# Patient Record
Sex: FEMALE | Race: WHITE | Employment: OTHER | URBAN - METROPOLITAN AREA
[De-identification: names, ages, dates, MRNs, and addresses within clinical notes are randomized per-mention and may not be internally consistent; named-entity substitution may affect disease eponyms.]

---

## 2017-06-22 LAB
CREATININE, EXTERNAL: 1.21
HBA1C MFR BLD HPLC: 6.8 %
LDL-C, EXTERNAL: 131

## 2017-06-29 ENCOUNTER — OFFICE VISIT (OUTPATIENT)
Dept: CARDIOLOGY CLINIC | Age: 82
End: 2017-06-29

## 2017-06-29 VITALS
DIASTOLIC BLOOD PRESSURE: 74 MMHG | SYSTOLIC BLOOD PRESSURE: 150 MMHG | HEART RATE: 85 BPM | HEIGHT: 60 IN | BODY MASS INDEX: 27.21 KG/M2 | RESPIRATION RATE: 18 BRPM | WEIGHT: 138.6 LBS

## 2017-06-29 DIAGNOSIS — R06.09 DOE (DYSPNEA ON EXERTION): ICD-10-CM

## 2017-06-29 DIAGNOSIS — R79.89 ELEVATED BRAIN NATRIURETIC PEPTIDE (BNP) LEVEL: Primary | ICD-10-CM

## 2017-06-29 DIAGNOSIS — I10 ESSENTIAL HYPERTENSION: ICD-10-CM

## 2017-06-29 DIAGNOSIS — J44.9 CHRONIC OBSTRUCTIVE PULMONARY DISEASE, UNSPECIFIED COPD TYPE (HCC): ICD-10-CM

## 2017-06-29 RX ORDER — LEVOTHYROXINE SODIUM 100 UG/1
TABLET ORAL
Refills: 1 | COMMUNITY
Start: 2017-04-15 | End: 2018-01-01

## 2017-06-29 RX ORDER — BLOOD-GLUCOSE METER
KIT MISCELLANEOUS
Refills: 0 | COMMUNITY
Start: 2017-06-03 | End: 2018-01-01

## 2017-06-29 RX ORDER — ASPIRIN 81 MG/1
81 TABLET ORAL DAILY
COMMUNITY

## 2017-06-29 RX ORDER — INSULIN GLARGINE 100 [IU]/ML
INJECTION, SOLUTION SUBCUTANEOUS
COMMUNITY
End: 2018-01-01

## 2017-06-29 RX ORDER — GLIPIZIDE 10 MG/1
TABLET ORAL
Refills: 0 | COMMUNITY
Start: 2017-06-09 | End: 2018-01-01

## 2017-06-29 RX ORDER — METFORMIN HYDROCHLORIDE 1000 MG/1
500 TABLET ORAL DAILY
COMMUNITY
End: 2018-01-01

## 2017-06-29 RX ORDER — POTASSIUM CHLORIDE 750 MG/1
TABLET, FILM COATED, EXTENDED RELEASE ORAL
Refills: 0 | COMMUNITY
Start: 2017-06-20 | End: 2018-01-01

## 2017-06-29 RX ORDER — FUROSEMIDE 40 MG/1
TABLET ORAL
Refills: 0 | COMMUNITY
Start: 2017-06-20 | End: 2017-07-20 | Stop reason: DRUGHIGH

## 2017-06-29 RX ORDER — LISINOPRIL 40 MG/1
TABLET ORAL
Refills: 0 | COMMUNITY
Start: 2017-05-08 | End: 2018-01-01

## 2017-06-29 NOTE — PROGRESS NOTES
Deneen Heath is a [de-identified] y.o. female    Chief Complaint   Patient presents with    Shortness of Breath

## 2017-06-29 NOTE — PROGRESS NOTES
Ignacia Dexter     1935       Jodi Lazaro MD, [facility] [place]  Date of Visit-2017   PCP is No primary care provider on file. [facility] [place]  [facility]  HPI:  Ignacia Dexter is a [de-identified] y.o. female   New patient. Referred by Dr Liam Caputo at [facility] for SOB. She has DM, HTN, and dyslipidemia. Cardiac complaints are SOB and swelling with no chest pain. She reports long history of SOB that has worsened recently. Her LE edema is mild bilaterally. She also notes occasional chest twinges. She reports dyspnea with activity such as sweeping porch or bending over to do laundry. She also reports long history of abdominal tightness with walking distances. She takes a baby ASA daily. Denies syncope, has no tachycardia, palpitations or sense of arrhythmia. Social hx: smoking 2 ppd for 30 years until , retired  Family hx: mother passed at 80, father  from ruptured AAA in 's  Sees Dr Kyler Lyon for DM  EKG:   Labs from Dr Mario Aden reviewed include  BNP of 477  Assessment/Plan:       HAYS  -worsening  -differential includes CAD, CHF, and COPD  -at risk for silent ischemia given DM  -BNP was elevated at 477 on    -this would lean us toward CHF  -question of whether it is diastolic or systolic  -already on furosemide or add aldactone depending on the echo result  -she is not SOB at rest so I do not think we need to make that change today     Suspected COPD  -has chest X-ray pending with Dr Mario Aden  -she is not anemic  -hemoglobin is 14    HTN  -elevated  -again aldactone may be good option to add    Will see her back in 2 weeks for testing and will decide on follow up based on results of echo         Impression:   1. Elevated brain natriuretic peptide (BNP) level    2. HAYS (dyspnea on exertion)    3. Chronic obstructive pulmonary disease, unspecified COPD type ([address]    4.  Essential hypertension        History: cataract surgery in  no cath or [place]  Social quit in  smoked 2 ppd for 35-40 years, no Etoh, 1 cup coffee, retired lives by herself, Tv reading [employer]  Family Hx== mother  at 80, father  of AA around 54, brother with DM  Review of Systems   Constitutional: Negative for diaphoresis, fever and malaise/fatigue. HENT: Negative for ear pain, hearing loss, nosebleeds and tinnitus. Eyes: Negative for blurred vision, double vision and pain. Respiratory: Positive for shortness of breath. Negative for cough, hemoptysis, sputum production, wheezing and stridor. Cardiovascular: Positive for leg swelling. Negative for chest pain, palpitations, orthopnea and claudication. Gastrointestinal: Negative for abdominal pain, blood in stool, constipation, diarrhea, heartburn, melena, nausea and vomiting. Genitourinary: Negative for dysuria, frequency and urgency. Musculoskeletal: Positive for joint pain. Negative for back pain, falls, myalgias and neck pain. Skin: Negative for rash. Neurological: Negative for dizziness, sensory change, seizures, loss of consciousness, weakness and headaches. Endo/Heme/Allergies: Does not bruise/bleed easily. Psychiatric/Behavioral: Negative for depression, hallucinations and memory loss. The patient is not nervous/anxious and does not have insomnia. see supplement sheet, initialed and to be scanned by staff  Past Medical History:   Diagnosis Date    Arthritis     Diabetes ([facility]     Shortness of breath     Thyroid disease         Allergies   Allergen Reactions    Other Medication Rash     Auromycin  Chlorocetin      Exam and Labs:    Visit Vitals    /74 (BP 1 Location: Left arm, BP Patient Position: Sitting)    Pulse 85    Resp 18    Ht 5' (1.524 m)    Wt 138 lb 9.6 oz (62.9 kg)    BMI 27.07 kg/m2      Constitutional:  NAD, comfortable  Head: NC,AT. Eyes: No scleral icterus. Neck:  Neck supple. No JVD present. Throat: moist mucous membranes.   Chest: Effort normal & normal respiratory excursion . Neurological: alert, conversant and oriented . Skin: Skin is not cold. No obvious systemic rash noted. Not diaphoretic. No erythema. Psychiatric:  Grossly normal mood and affect. Behavior appears normal. Extremities:  no clubbing or cyanosis. Abdomen: non distended    Lungs:decreased breath sounds throughout, crackles at right lower third. No stridor. distress, wheezes or  Rales. Heart:    normal rate, regular rhythm, normal S1, S2, no murmurs, rubs, clicks or gallops , PMI non displaced. Edema: Edema is none. Current Outpatient Prescriptions   Medication Sig    FREESTYLE LITE STRIPS strip TEST BLOOD SUGAR BID    furosemide (LASIX) 40 mg tablet TK 1 T PO QD    glipiZIDE (GLUCOTROL) 10 mg tablet TAKE 1 T PO BID    levothyroxine (SYNTHROID) 100 mcg tablet TK 1 T PO DAILY MONDAY THROUGH SATURDAY AND 2 TS PO ON SUNDAY    lisinopril (PRINIVIL, ZESTRIL) 40 mg tablet TK 1 T PO QD    potassium chloride SR (KLOR-CON 10) 10 mEq tablet TK 1 T PO QD    aspirin delayed-release 81 mg tablet Take  by mouth daily.  metFORMIN (GLUCOPHAGE) 1,000 mg tablet Take 500 mg by mouth daily.  insulin glargine (LANTUS SOLOSTAR) 100 unit/mL (3 mL) inpn by SubCUTAneous route.  VIT C/E/ZN/COPPR/LUTEIN/ZEAXAN (PRESERVISION AREDS 2 PO) Take  by mouth. No current facility-administered medications for this visit. Impression see above.     Written by Chantal Khalil, as dictated by Luis Alfredo Howard MD.

## 2017-06-29 NOTE — MR AVS SNAPSHOT
Visit Information Date & Time Provider Department Dept. Phone Encounter #  
 6/29/2017 10:00 AM rAis Shah MD [facility] [phone] [id number] Upcoming Health Maintenance Date Due DTaP/Tdap/Td series (1 - Tdap) 12/20/1957 ZOSTER VACCINE AGE 60> 12/20/1996 GLAUCOMA SCREENING Q2Y 12/20/2001 OSTEOPOROSIS SCREENING (DEXA) 12/20/2001 Pneumococcal 65+ Low/Medium Risk (1 of 2 - PCV13) 12/20/2001 MEDICARE YEARLY EXAM 12/20/2001 INFLUENZA AGE 9 TO ADULT 8/1/2017 Allergies as of 6/29/2017  Review Complete On: 6/29/2017 By: Luis Angel Goodrich Not on File Current Immunizations  Never Reviewed No immunizations on file. Not reviewed this visit Vitals BP Pulse Resp Height(growth percentile) Weight(growth percentile) BMI  
 150/74 (BP 1 Location: Left arm, BP Patient Position: Sitting) 85 18 5' (1.524 m) 138 lb 9.6 oz (62.9 kg) 27.07 kg/m2 OB Status Smoking Status Postmenopausal Former Smoker Vitals History BMI and BSA Data Body Mass Index Body Surface Area  
 27.07 kg/m 2 1.63 m 2 Preferred Pharmacy Pharmacy Name Phone [address], [facility] [address] [phone] Your Updated Medication List  
  
   
This list is accurate as of: 6/29/17 11:27 AM.  Always use your most recent med list.  
  
  
  
  
 aspirin delayed-release 81 mg tablet Take  by mouth daily. FREESTYLE LITE STRIPS strip Generic drug:  glucose blood VI test strips TEST BLOOD SUGAR BID  
  
 furosemide 40 mg tablet Commonly known as:  LASIX TK 1 T PO QD  
  
 glipiZIDE 10 mg tablet Commonly known as:  GLUCOTROL  
TAKE 1 T PO BID  
  
 LANTUS SOLOSTAR 100 unit/mL (3 mL) Inpn Generic drug:  insulin glargine  
by SubCUTAneous route. levothyroxine 100 mcg tablet Commonly known as:  SYNTHROID  
 TK 1 T PO DAILY MONDAY THROUGH SATURDAY AND 2 TS PO ON SUNDAY  
  
 lisinopril 40 mg tablet Commonly known as:  Joellen Golder TK 1 T PO QD  
  
 metFORMIN 1,000 mg tablet Commonly known as:  GLUCOPHAGE Take 500 mg by mouth daily. potassium chloride SR 10 mEq tablet Commonly known as:  KLOR-CON 10 TK 1 T PO QD  
  
 PRESERVISION AREDS 2 PO Take  by mouth. Patient Instructions Nuclear stress test and Echo Follow up with  in 2 weeks Introducing [facility]! Isabel Riojas introduces [employer] [place] patient portal. Now you can access parts of your medical record, email your doctor's office, and request medication refills online. 1. In your internet browser, go to [email or web address] 2. Click on the First Time User? Click Here link in the Sign In box. You will see the New Member Sign Up page. 3. Enter your [employer] [place] Access Code exactly as it appears below. You will not need to use this code after youve completed the sign-up process. If you do not sign up before the expiration date, you must request a new code. · [employer] [place] Access Code: [email or web address] Expires: 9/27/2017  7:47 AM 
 
4. Enter the last four digits of your Social Security Number (xxxx) and Date of Birth (mm/dd/yyyy) as indicated and click Submit. You will be taken to the next sign-up page. 5. Create a [employer] [place] ID. This will be your [employer] [place] login ID and cannot be changed, so think of one that is secure and easy to remember. 6. Create a [employer] [place] password. You can change your password at any time. 7. Enter your Password Reset Question and Answer. This can be used at a later time if you forget your password. 8. Enter your e-mail address. You will receive e-mail notification when new information is available in [address] 9. Click Sign Up. You can now view and download portions of your medical record.  
10. Click the Download Summary menu link to download a portable copy of your medical information. If you have questions, please visit the Frequently Asked Questions section of the [employer] website. Remember, [employer] is NOT to be used for urgent needs. For medical emergencies, dial 911. Now available from your iPhone and Android! Please provide this summary of care documentation to your next provider. If you have any questions after today's visit, please call [phone].

## 2017-07-13 ENCOUNTER — CLINICAL SUPPORT (OUTPATIENT)
Dept: CARDIOLOGY CLINIC | Age: 82
End: 2017-07-13

## 2017-07-13 ENCOUNTER — TELEPHONE (OUTPATIENT)
Dept: CARDIOLOGY CLINIC | Age: 82
End: 2017-07-13

## 2017-07-13 DIAGNOSIS — I50.9 CONGESTIVE HEART FAILURE, UNSPECIFIED CONGESTIVE HEART FAILURE CHRONICITY, UNSPECIFIED CONGESTIVE HEART FAILURE TYPE: Primary | ICD-10-CM

## 2017-07-13 DIAGNOSIS — R06.09 DOE (DYSPNEA ON EXERTION): ICD-10-CM

## 2017-07-13 DIAGNOSIS — I10 ESSENTIAL HYPERTENSION: ICD-10-CM

## 2017-07-13 DIAGNOSIS — R06.02 SHORTNESS OF BREATH: Primary | ICD-10-CM

## 2017-07-13 NOTE — PROGRESS NOTES
See scanned document.       Ordering Doctor:Dr. Soto Spine  Reading Doctor:Dr. Soto Spine    Patient unable to walk on the TM changed to [facility]

## 2017-07-14 NOTE — TELEPHONE ENCOUNTER
Verified patient with two types of identifiers. Notified of results. She would like a copy of her nuclear stress test mailed to her. Notified her that it may be a bit before she gets it but will mail once it is scanned.

## 2017-07-14 NOTE — TELEPHONE ENCOUNTER
MAKAYLA July 2017 =Lexiscan pharmacologic stress test shows normal perfusion with an EF of WNL %.    Let pt know her nuclear is normal

## 2017-07-20 ENCOUNTER — OFFICE VISIT (OUTPATIENT)
Dept: CARDIOLOGY CLINIC | Age: 82
End: 2017-07-20

## 2017-07-20 VITALS
HEART RATE: 89 BPM | HEIGHT: 60 IN | DIASTOLIC BLOOD PRESSURE: 78 MMHG | OXYGEN SATURATION: 90 % | WEIGHT: 140.2 LBS | SYSTOLIC BLOOD PRESSURE: 130 MMHG | BODY MASS INDEX: 27.52 KG/M2

## 2017-07-20 DIAGNOSIS — J44.9 CHRONIC OBSTRUCTIVE PULMONARY DISEASE, UNSPECIFIED COPD TYPE (HCC): ICD-10-CM

## 2017-07-20 DIAGNOSIS — I10 ESSENTIAL HYPERTENSION: ICD-10-CM

## 2017-07-20 DIAGNOSIS — R06.09 DOE (DYSPNEA ON EXERTION): Primary | ICD-10-CM

## 2017-07-20 DIAGNOSIS — R79.89 ELEVATED BRAIN NATRIURETIC PEPTIDE (BNP) LEVEL: ICD-10-CM

## 2017-07-20 RX ORDER — FUROSEMIDE 80 MG/1
TABLET ORAL DAILY
COMMUNITY
End: 2017-07-20 | Stop reason: SDUPTHER

## 2017-07-20 RX ORDER — FUROSEMIDE 80 MG/1
80 TABLET ORAL DAILY
Qty: 30 TAB | Refills: 4 | Status: SHIPPED | OUTPATIENT
Start: 2017-07-20 | End: 2017-08-17 | Stop reason: DRUGHIGH

## 2017-07-20 NOTE — MR AVS SNAPSHOT
Visit Information Date & Time Provider Department Dept. Phone Encounter #  
 7/20/2017 10:20 AM Howard Gomez MD [facility] [phone] [id number] Your Appointments 7/20/2017 10:20 AM  
ESTABLISHED PATIENT with Howard Gomez MD  
[facility] ([facility] SCHEDULING) Appt Note: one week follow up from testing [address]  
[address] [id number] Upcoming Health Maintenance Date Due DTaP/Tdap/Td series (1 - Tdap) 12/20/1957 ZOSTER VACCINE AGE 60> 12/20/1996 GLAUCOMA SCREENING Q2Y 12/20/2001 OSTEOPOROSIS SCREENING (DEXA) 12/20/2001 Pneumococcal 65+ Low/Medium Risk (1 of 2 - PCV13) 12/20/2001 MEDICARE YEARLY EXAM 12/20/2001 INFLUENZA AGE 9 TO ADULT 8/1/2017 Allergies as of 7/20/2017  Review Complete On: 6/29/2017 By: Hardik Butler Not on File Current Immunizations  Never Reviewed No immunizations on file. Not reviewed this visit Vitals OB Status Smoking Status Postmenopausal Former Smoker Your Updated Medication List  
  
   
This list is accurate as of: 7/18/17  1:58 PM.  Always use your most recent med list.  
  
  
  
  
 aspirin delayed-release 81 mg tablet Take  by mouth daily. FREESTYLE LITE STRIPS strip Generic drug:  glucose blood VI test strips TEST BLOOD SUGAR BID  
  
 furosemide 40 mg tablet Commonly known as:  LASIX TK 1 T PO QD  
  
 glipiZIDE 10 mg tablet Commonly known as:  GLUCOTROL  
TAKE 1 T PO BID  
  
 LANTUS SOLOSTAR 100 unit/mL (3 mL) Inpn Generic drug:  insulin glargine  
by SubCUTAneous route. levothyroxine 100 mcg tablet Commonly known as:  SYNTHROID TK 1 T PO DAILY MONDAY THROUGH SATURDAY AND 2 TS PO ON SUNDAY  
  
 lisinopril 40 mg tablet Commonly known as:  Saman Hakeem TK 1 T PO QD  
  
 metFORMIN 1,000 mg tablet Commonly known as:  GLUCOPHAGE Take 500 mg by mouth daily. potassium chloride SR 10 mEq tablet Commonly known as:  KLOR-CON 10 TK 1 T PO QD  
  
 PRESERVISION AREDS 2 PO Take  by mouth. Introducing [facility]! Isabel Riojas introduces [employer] patient portal. Now you can access parts of your medical record, email your doctor's office, and request medication refills online. 1. In your internet browser, go to [email or web address] 2. Click on the First Time User? Click Here link in the Sign In box. You will see the New Member Sign Up page. 3. Enter your [employer] Access Code exactly as it appears below. You will not need to use this code after youve completed the sign-up process. If you do not sign up before the expiration date, you must request a new code. · [employer] Access Code: [email or web address] Expires: 9/27/2017  7:47 AM 
 
4. Enter the last four digits of your Social Security Number (xxxx) and Date of Birth (mm/dd/yyyy) as indicated and click Submit. You will be taken to the next sign-up page. 5. Create a [employer] ID. This will be your [employer] login ID and cannot be changed, so think of one that is secure and easy to remember. 6. Create a [employer] password. You can change your password at any time. 7. Enter your Password Reset Question and Answer. This can be used at a later time if you forget your password. 8. Enter your e-mail address. You will receive e-mail notification when new information is available in [address] 9. Click Sign Up. You can now view and download portions of your medical record. 10. Click the Download Summary menu link to download a portable copy of your medical information. If you have questions, please visit the Frequently Asked Questions section of the [employer] website. Remember, [employer] is NOT to be used for urgent needs. For medical emergencies, dial 911. Now available from your iPhone and Android! Please provide this summary of care documentation to your next provider. If you have any questions after today's visit, please call [phone].

## 2017-07-20 NOTE — PROGRESS NOTES
Jude Bailon     1935       Jodi Neely MD, [facility] [place]  Date of Visit-7/20/2017   PCP is Linard Nageotte, MD   [facility] [place]  [facility]  HPI:  Jude Bailon is a [de-identified] y.o. female     Seen as new patient on 6/29 for SOB and LE swelling. Had BNP elevated to 477 and suspected COPD. Considered adding aldactone to her high BP regimen and at that time her BP was 150/74. Since then she has had normal stress nuclear as lexiscan. She also had echo with EF of 55-60% and mild valve regurgitation. The most significant finding though was right ventricular enlargement and RV dysfunction likely from COPD. She reports mild LE edema. She also notes 2 open sores on right foot. She denies any claudication. Denies chest pain, syncope or shortness of breath at rest, has no tachycardia, palpitations or sense of arrhythmia. 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 cor pulmonale form COPD  -has still significant edema   -will increase lasix to 80 mg daily as she is having some loss of skin integrity     -explained to her that her COPD is from former smoking and current aging   -our goal in RV failure is BP control and testing underlying COPD and edema     -likely PVD with possible early wound developing on right second toe     -will see if diuretics help and will get PEPE's with PVR's in 1 week     HTN  -at goal on ACE    Lipids with PCP LDL is 131 Cr is 1.2  Follow up in 1 month  Future Appointments  Date Time Provider Alice Thornton   8/17/2017 1:00 PM VASCULAR, [address]   8/17/2017 1:40 PM MD TARA Kruse SCHED       Impression:   1. HAYS (dyspnea on exertion)    2. Essential hypertension    3. Chronic obstructive pulmonary disease, unspecified COPD type ([address]    4. Elevated brain natriuretic peptide (BNP) level       Cardiac History:   No specialty comments available. ROS-except as noted above. . A complete cardiac and respiratory are reviewed and negative except as above ; Resp-denies wheezing  or productive cough,. Const- No unusual weight loss or fever; Neuro-no recent seizure or CVA ; GI- No BRBPR, abdom pain, bloating ; - no  hematuria   see supplement sheet, initialed and to be scanned by staff  Past Medical History:   Diagnosis Date    Arthritis     Diabetes ([address]     Shortness of breath     Thyroid disease       Social Hx= reports that she quit smoking about 17 years ago. She has never used smokeless tobacco. She reports that she does not drink alcohol or use illicit drugs. Exam and Labs:    Visit Vitals    /78 (BP 1 Location: Right arm, BP Patient Position: Sitting)    Pulse 89    Ht 5' (1.524 m)    Wt 140 lb 3.2 oz (63.6 kg)    SpO2 90%    BMI 27.38 kg/m2      Constitutional:  NAD, comfortable  Head: NC,AT. Eyes: No scleral icterus. Neck:  Neck supple. No JVD present. Throat: moist mucous membranes. Chest: Effort normal & normal respiratory excursion . Neurological: alert, conversant and oriented . Skin: Skin is not cold. No obvious systemic rash noted. Not diaphoretic. No erythema. Psychiatric:  Grossly normal mood and affect. Behavior appears normal. Extremities: right foot second digit SCHAR along the dorsal surface 1 cm round with bluish discoloration and some skin integrity loss, left foot no wound but some plethora of both feet, left DP 2+, right DP is 1+, no clubbing or cyanosis. Abdomen: non distended   Lungs:dry crackles throughout. No stridor. distress, wheezes or  Rales. Heart:normal rate, regular rhythm, normal S1, S2, no murmurs, rubs, clicks or gallops , PMI non displaced. Edema: Edema is none. No results found for: CHOL, CHOLX, CHLST, CHOLV, HDL, LDL, LDLC, DLDLP, TGLX, TRIGL, TRIGP, CHHD, CHHDX  No results found for this or any previous visit.  No results found for: NA, K, CL, CO2, AGAP, GLU, BUN, CREA, BUCR, GFRAA, GFRNA, CA, GFRAA   Wt Readings from Last 3 Encounters: 07/20/17 140 lb 3.2 oz (63.6 kg)   06/29/17 138 lb 9.6 oz (62.9 kg)      BP Readings from Last 3 Encounters:   07/20/17 130/78   06/29/17 150/74        Current Outpatient Prescriptions   Medication Sig    FREESTYLE LITE STRIPS strip TEST BLOOD SUGAR BID    furosemide (LASIX) 40 mg tablet TK 1 T PO QD    glipiZIDE (GLUCOTROL) 10 mg tablet TAKE 1 T PO BID    levothyroxine (SYNTHROID) 100 mcg tablet TK 1 T PO DAILY MONDAY THROUGH SATURDAY AND 2 TS PO ON SUNDAY    lisinopril (PRINIVIL, ZESTRIL) 40 mg tablet TK 1 T PO QD    potassium chloride SR (KLOR-CON 10) 10 mEq tablet TK 1 T PO QD    aspirin delayed-release 81 mg tablet Take  by mouth daily.  metFORMIN (GLUCOPHAGE) 1,000 mg tablet Take 500 mg by mouth daily.  insulin glargine (LANTUS SOLOSTAR) 100 unit/mL (3 mL) inpn by SubCUTAneous route.  VIT C/E/ZN/COPPR/LUTEIN/ZEAXAN (PRESERVISION AREDS 2 PO) Take  by mouth. No current facility-administered medications for this visit. Impression see above.     Written by Milka Velasquez, as dictated by Steve Corley MD.

## 2017-07-23 PROBLEM — J44.9 CHRONIC OBSTRUCTIVE PULMONARY DISEASE (HCC): Status: ACTIVE | Noted: 2017-07-23

## 2017-07-23 PROBLEM — R79.89 ELEVATED BRAIN NATRIURETIC PEPTIDE (BNP) LEVEL: Status: ACTIVE | Noted: 2017-07-23

## 2017-07-23 PROBLEM — R06.09 DOE (DYSPNEA ON EXERTION): Status: ACTIVE | Noted: 2017-07-23

## 2017-07-23 PROBLEM — I10 ESSENTIAL HYPERTENSION: Status: ACTIVE | Noted: 2017-07-23

## 2017-08-17 ENCOUNTER — OFFICE VISIT (OUTPATIENT)
Dept: CARDIOLOGY CLINIC | Age: 82
End: 2017-08-17

## 2017-08-17 ENCOUNTER — CLINICAL SUPPORT (OUTPATIENT)
Dept: CARDIOLOGY CLINIC | Age: 82
End: 2017-08-17

## 2017-08-17 VITALS
WEIGHT: 137.8 LBS | SYSTOLIC BLOOD PRESSURE: 146 MMHG | HEIGHT: 60 IN | DIASTOLIC BLOOD PRESSURE: 72 MMHG | HEART RATE: 92 BPM | RESPIRATION RATE: 16 BRPM | BODY MASS INDEX: 27.05 KG/M2

## 2017-08-17 DIAGNOSIS — I10 ESSENTIAL HYPERTENSION: ICD-10-CM

## 2017-08-17 DIAGNOSIS — I70.249 ATHEROSCLEROSIS OF NATIVE ARTERY OF BOTH LOWER EXTREMITIES WITH BILATERAL ULCERATION, UNSPECIFIED ULCERATION SITE (HCC): Primary | ICD-10-CM

## 2017-08-17 DIAGNOSIS — R06.09 DOE (DYSPNEA ON EXERTION): ICD-10-CM

## 2017-08-17 DIAGNOSIS — I27.81 COR PULMONALE (HCC): Primary | ICD-10-CM

## 2017-08-17 DIAGNOSIS — R79.89 ELEVATED BRAIN NATRIURETIC PEPTIDE (BNP) LEVEL: ICD-10-CM

## 2017-08-17 DIAGNOSIS — I70.239 ATHEROSCLEROSIS OF NATIVE ARTERY OF BOTH LOWER EXTREMITIES WITH BILATERAL ULCERATION, UNSPECIFIED ULCERATION SITE (HCC): Primary | ICD-10-CM

## 2017-08-17 RX ORDER — FUROSEMIDE 80 MG/1
80 TABLET ORAL DAILY
COMMUNITY
End: 2018-01-01 | Stop reason: SDUPTHER

## 2017-08-17 RX ORDER — FUROSEMIDE 40 MG/1
40 TABLET ORAL
COMMUNITY
End: 2017-08-17 | Stop reason: SDUPTHER

## 2017-08-17 NOTE — PROCEDURES
[facility]  *** FINAL REPORT ***    Name: Lott Aase  MRN: [id number]      Outpatient  : 20 Dec 1935  HIS Order #: [id number]  [address] [facility] Visit #: [id number]  Date: 17 Aug 2017    TYPE OF TEST: Peripheral Arterial Testing    REASON FOR TEST  Extremity ulceration (both sides)    Right Leg  Segmentals: Normal                     mmHg  Brachial         165  High thigh       211  Low thigh        199  Calf             195  Posterior tibial 186  Dorsalis pedis   200  Peroneal  Metatarsal  Toe pressure     148  Doppler:    Normal  PVR:        Normal  Ankle/Brachial: 1.18    Left Leg  Segmentals: Normal                     mmHg  Brachial         169  High thigh       201  Low thigh        201  Calf             211  Posterior tibial 186  Dorsalis pedis   184  Peroneal  Metatarsal  Toe pressure     123  Doppler:    Normal  PVR:        Normal  Ankle/Brachial: 1.10    INTERPRETATION/FINDINGS  PROCEDURE:  Multi-level lower extremity arterial segmental pressures,  CW Doppler waveforms and digital PPG waveforms were performed. IMPRESSION:  Doppler waveforms are multiphasic at all levels. Pulse  volume recordings appear adequate bilaterally. There are no  significant pressure gradients. Resting ankle/brachial indices are  normal at 1.18 on the right and 1.10 on the left. The patient has  lesions on the 2nd toe bilaterally. Toe waveforms and pressures are  preserved on these digits. IMPRESSION:  1. No evidence of significant peripheral arterial disease at rest in  the right leg. 2. No evidence of significant peripheral arterial disease at rest in  the left leg. 3. The right ankle/brachial index is 1.18 and the left ankle/brachial  index is 1.10.  4. Toe indices are normal within the 2nd digit bilaterally (site of  lesions). ADDITIONAL COMMENTS    I have personally reviewed the data relevant to the interpretation of  this  study.     TECHNOLOGIST: Martha Dietz RVT, RDMS, RDCS  Signed: 08/17/2017 03:33 PM    PHYSICIAN: Murtaza Fatima MD  Signed: 08/18/2017 04:21 PM

## 2017-08-17 NOTE — MR AVS SNAPSHOT
Visit Information Date & Time Provider Department Dept. Phone Encounter #  
 8/17/2017  1:40 PM Savana Johnson MD [facility] [phone] [id number] Upcoming Health Maintenance Date Due DTaP/Tdap/Td series (1 - Tdap) 12/20/1957 ZOSTER VACCINE AGE 60> 10/20/1996 GLAUCOMA SCREENING Q2Y 12/20/2001 OSTEOPOROSIS SCREENING (DEXA) 12/20/2001 Pneumococcal 65+ Low/Medium Risk (1 of 2 - PCV13) 12/20/2001 MEDICARE YEARLY EXAM 12/20/2001 INFLUENZA AGE 9 TO ADULT 8/1/2017 Allergies as of 8/17/2017  Review Complete On: 8/17/2017 By: Christine Vazquez Severity Noted Reaction Type Reactions Other Medication  07/20/2017    Rash Auromycin Chlorocetin Current Immunizations  Never Reviewed No immunizations on file. Not reviewed this visit Vitals BP Pulse Resp Height(growth percentile) Weight(growth percentile) BMI  
 146/72 (BP 1 Location: Right arm, BP Patient Position: Standing) 92 16 5' (1.524 m) 137 lb 12.8 oz (62.5 kg) 26.91 kg/m2 OB Status Smoking Status Postmenopausal Former Smoker Vitals History BMI and BSA Data Body Mass Index Body Surface Area  
 26.91 kg/m 2 1.63 m 2 Preferred Pharmacy Pharmacy Name Phone [address] [place] [address] [phone] Your Updated Medication List  
  
   
This list is accurate as of: 8/17/17  2:39 PM.  Always use your most recent med list.  
  
  
  
  
 aspirin delayed-release 81 mg tablet Take  by mouth daily. FREESTYLE LITE STRIPS strip Generic drug:  glucose blood VI test strips TEST BLOOD SUGAR BID  
  
 glipiZIDE 10 mg tablet Commonly known as:  GLUCOTROL  
TAKE 1 T PO BID  
  
 LANTUS SOLOSTAR 100 unit/mL (3 mL) Inpn Generic drug:  insulin glargine  
by SubCUTAneous route. * LASIX 40 mg tablet Generic drug:  furosemide Take 40 mg by mouth every Monday, Wednesday, Friday. * LASIX 80 mg tablet Generic drug:  furosemide Take 80 mg by mouth daily. Tuesday,Thursday,Saturday and Sunday  
  
 levothyroxine 100 mcg tablet Commonly known as:  SYNTHROID TK 1 T PO DAILY MONDAY THROUGH SATURDAY AND 2 TS PO ON SUNDAY  
  
 lisinopril 40 mg tablet Commonly known as:  Reese Boehringer TK 1 T PO QD  
  
 metFORMIN 1,000 mg tablet Commonly known as:  GLUCOPHAGE Take 500 mg by mouth daily. potassium chloride SR 10 mEq tablet Commonly known as:  KLOR-CON 10 TK 1 T PO QD  
  
 PRESERVISION AREDS 2 PO Take  by mouth. * Notice: This list has 2 medication(s) that are the same as other medications prescribed for you. Read the directions carefully, and ask your doctor or other care provider to review them with you. Patient Instructions Lasix 40 mg on Monday,Wednesday,Friday and 2 tablets on Tuesday,Thursday,Saturday and Sunday Follow up with PCP in 1 month Follow up with  in 6 months Introducing [facility]! Landon Tipton introduces [employer] patient portal. Now you can access parts of your medical record, email your doctor's office, and request medication refills online. 1. In your internet browser, go to [email or web address] 2. Click on the First Time User? Click Here link in the Sign In box. You will see the New Member Sign Up page. 3. Enter your [employer] Access Code exactly as it appears below. You will not need to use this code after youve completed the sign-up process. If you do not sign up before the expiration date, you must request a new code. · [employer] Access Code: [email or web address] Expires: 9/27/2017  7:47 AM 
 
4. Enter the last four digits of your Social Security Number (xxxx) and Date of Birth (mm/dd/yyyy) as indicated and click Submit. You will be taken to the next sign-up page. 5. Create a Track ID. This will be your Track login ID and cannot be changed, so think of one that is secure and easy to remember. 6. Create a Track password. You can change your password at any time. 7. Enter your Password Reset Question and Answer. This can be used at a later time if you forget your password. 8. Enter your e-mail address. You will receive e-mail notification when new information is available in [address] 9. Click Sign Up. You can now view and download portions of your medical record. 10. Click the Download Summary menu link to download a portable copy of your medical information. If you have questions, please visit the Frequently Asked Questions section of the Track website. Remember, Track is NOT to be used for urgent needs. For medical emergencies, dial 911. Now available from your iPhone and Android! Please provide this summary of care documentation to your next provider. Your primary care clinician is listed as Sonny Sanchez. If you have any questions after today's visit, please call [phone].

## 2017-08-17 NOTE — PATIENT INSTRUCTIONS
Lasix 40 mg on Monday,Wednesday,Friday and 2 tablets on Tuesday,Thursday,Saturday and Sunday    Follow up with PCP in 1 month    Follow up with  in 6 months

## 2017-08-17 NOTE — PROGRESS NOTES
Katelynn Saleem     1935       Jodi Og MD, [facility]  Date of Visit-8/17/2017   PCP is Sawyer Kaye MD   [facility] [place]  [facility]  HPI:  Katelynn Saleem is a [de-identified] y.o. female     Follow up of RV dysfunction and possible cor pulmonale. Started on diuretic 1 month ago and also got PEPE's. Evalina Arm nuclear in July was normal. EF was 64%. PEPE's are normal.     She is doing well and notes her breathing has improved since being placed on fluid pill. She does report intermittent dizziness for the last 5 days occurring in the mornings. Denies chest pain, edema, syncope or shortness of breath at rest, has no tachycardia, palpitations or sense of arrhythmia. Assessment/Plan:       RV dysfunction  -mild cor pulmonale from COPD   -did very well with increase in Lasix to 80 mg     -does not have PVD based on PEPE's today so the small area on her right second toe is probably mechanical and can just wear some gauze around it so her shoes do not irritate this    HTN  -generally favorable  -a little high today  -will follow up with Dr Aron Ly    Can reduce Lasix to 40 mg MWF and 80 mg the other days    See her back in 6 months, like her to see Dr Aron Ly in next month for BP check      Impression:   1. Cor pulmonale (HCC)    2. Essential hypertension    3. Elevated brain natriuretic peptide (BNP) level    4. HAYS (dyspnea on exertion)         ROS-except as noted above. . A complete cardiac and respiratory are reviewed and negative except as above ; Resp-denies wheezing  or productive cough,.  Const- No unusual weight loss or fever; Neuro-no recent seizure or CVA ; GI- No BRBPR, abdom pain, bloating ; - no  hematuria   see supplement sheet, initialed and to be scanned by staff  Past Medical History:   Diagnosis Date    Arthritis     Chronic obstructive pulmonary disease ([address] 7/23/2017    Diabetes ([address]     Elevated brain natriuretic peptide (BNP) level 7/23/2017    Essential hypertension 7/23/2017    Shortness of breath     Thyroid disease       Social Hx= reports that she quit smoking about 17 years ago. She has never used smokeless tobacco. She reports that she does not drink alcohol or use illicit drugs. Exam and Labs:    Visit Vitals    /72 (BP 1 Location: Right arm, BP Patient Position: Standing)    Pulse 92    Resp 16    Ht 5' (1.524 m)    Wt 137 lb 12.8 oz (62.5 kg)    BMI 26.91 kg/m2      Constitutional:  NAD, comfortable  Head: NC,AT. Eyes: No scleral icterus. Neck:  Neck supple. No JVD present. Throat: moist mucous membranes. Chest: Effort normal & normal respiratory excursion . Neurological: alert, conversant and oriented . Skin: Skin is not cold. No obvious systemic rash noted. Not diaphoretic. No erythema. Psychiatric:  Grossly normal mood and affect. Behavior appears normal. Extremities:  no clubbing or cyanosis. Abdomen: non distended    Lungs:breath sounds normal. No stridor. distress, wheezes or  Rales. Heart:    normal rate, regular rhythm, normal S1, S2, no murmurs, rubs, clicks or gallops , PMI non displaced. Edema: Edema is none. Wt Readings from Last 3 Encounters:   08/17/17 137 lb 12.8 oz (62.5 kg)   07/20/17 140 lb 3.2 oz (63.6 kg)   06/29/17 138 lb 9.6 oz (62.9 kg)      BP Readings from Last 3 Encounters:   08/17/17 146/72   07/20/17 130/78   06/29/17 150/74        Current Outpatient Prescriptions   Medication Sig    FREESTYLE LITE STRIPS strip TEST BLOOD SUGAR BID    glipiZIDE (GLUCOTROL) 10 mg tablet TAKE 1 T PO BID    levothyroxine (SYNTHROID) 100 mcg tablet TK 1 T PO DAILY MONDAY THROUGH SATURDAY AND 2 TS PO ON SUNDAY    lisinopril (PRINIVIL, ZESTRIL) 40 mg tablet TK 1 T PO QD    potassium chloride SR (KLOR-CON 10) 10 mEq tablet TK 1 T PO QD    aspirin delayed-release 81 mg tablet Take  by mouth daily.  metFORMIN (GLUCOPHAGE) 1,000 mg tablet Take 500 mg by mouth daily.     insulin glargine (LANTUS SOLOSTAR) 100 unit/mL (3 mL) inpn by SubCUTAneous route.  VIT C/E/ZN/COPPR/LUTEIN/ZEAXAN (PRESERVISION AREDS 2 PO) Take  by mouth. No current facility-administered medications for this visit. Impression see above.     Written by Steven Rodriguez, as dictated by Dorian Ballard MD.

## 2017-08-25 RX ORDER — FUROSEMIDE 40 MG/1
40 TABLET ORAL
Qty: 135 TAB | Refills: 3 | OUTPATIENT
Start: 2017-08-25 | End: 2018-01-01 | Stop reason: SDUPTHER

## 2017-08-29 PROBLEM — I27.81 COR PULMONALE (HCC): Status: ACTIVE | Noted: 2017-08-29

## 2017-09-18 NOTE — PROGRESS NOTES
CXR [facility] 6-29-17    Cardiomegaly and mild vascular congestion with no pulmonary edema or effusion

## 2017-10-05 LAB
CREATININE, EXTERNAL: 1.36
HBA1C MFR BLD HPLC: 6.4 %
LDL-C, EXTERNAL: 158

## 2018-01-01 ENCOUNTER — PATIENT OUTREACH (OUTPATIENT)
Dept: CASE MANAGEMENT | Age: 83
End: 2018-01-01

## 2018-01-01 ENCOUNTER — HOME CARE VISIT (OUTPATIENT)
Dept: SCHEDULING | Facility: HOME HEALTH | Age: 83
End: 2018-01-01
Payer: MEDICARE

## 2018-01-01 ENCOUNTER — OFFICE VISIT (OUTPATIENT)
Dept: CARDIOLOGY CLINIC | Age: 83
End: 2018-01-01

## 2018-01-01 ENCOUNTER — TELEPHONE (OUTPATIENT)
Dept: CARDIOLOGY CLINIC | Age: 83
End: 2018-01-01

## 2018-01-01 ENCOUNTER — HOME CARE VISIT (OUTPATIENT)
Dept: HOME HEALTH SERVICES | Facility: HOME HEALTH | Age: 83
End: 2018-01-01
Payer: MEDICARE

## 2018-01-01 ENCOUNTER — PATIENT OUTREACH (OUTPATIENT)
Dept: CARDIOLOGY CLINIC | Age: 83
End: 2018-01-01

## 2018-01-01 ENCOUNTER — HOSPITAL ENCOUNTER (INPATIENT)
Age: 83
LOS: 5 days | Discharge: NURSING, REHAB OR LONG-TERM CARE | DRG: 291 | End: 2018-05-22
Attending: EMERGENCY MEDICINE | Admitting: HOSPITALIST
Payer: MEDICARE

## 2018-01-01 ENCOUNTER — HOME HEALTH ADMISSION (OUTPATIENT)
Dept: HOME HEALTH SERVICES | Facility: HOME HEALTH | Age: 83
End: 2018-01-01
Payer: MEDICARE

## 2018-01-01 ENCOUNTER — APPOINTMENT (OUTPATIENT)
Dept: GENERAL RADIOLOGY | Age: 83
DRG: 291 | End: 2018-01-01
Attending: EMERGENCY MEDICINE
Payer: MEDICARE

## 2018-01-01 VITALS
TEMPERATURE: 97.3 F | SYSTOLIC BLOOD PRESSURE: 179 MMHG | HEART RATE: 87 BPM | RESPIRATION RATE: 20 BRPM | DIASTOLIC BLOOD PRESSURE: 86 MMHG | OXYGEN SATURATION: 90 %

## 2018-01-01 VITALS
HEART RATE: 87 BPM | OXYGEN SATURATION: 88 % | SYSTOLIC BLOOD PRESSURE: 152 MMHG | DIASTOLIC BLOOD PRESSURE: 81 MMHG | OXYGEN SATURATION: 95 % | TEMPERATURE: 98 F | HEART RATE: 84 BPM | DIASTOLIC BLOOD PRESSURE: 90 MMHG | RESPIRATION RATE: 18 BRPM | SYSTOLIC BLOOD PRESSURE: 155 MMHG | TEMPERATURE: 97.8 F | RESPIRATION RATE: 16 BRPM

## 2018-01-01 VITALS
SYSTOLIC BLOOD PRESSURE: 116 MMHG | DIASTOLIC BLOOD PRESSURE: 70 MMHG | BODY MASS INDEX: 25.68 KG/M2 | WEIGHT: 136 LBS | RESPIRATION RATE: 18 BRPM | OXYGEN SATURATION: 92 % | TEMPERATURE: 97.3 F | HEART RATE: 75 BPM | HEIGHT: 61 IN

## 2018-01-01 VITALS
BODY MASS INDEX: 25.09 KG/M2 | TEMPERATURE: 98.1 F | RESPIRATION RATE: 18 BRPM | HEART RATE: 64 BPM | OXYGEN SATURATION: 92 % | SYSTOLIC BLOOD PRESSURE: 124 MMHG | DIASTOLIC BLOOD PRESSURE: 70 MMHG | WEIGHT: 132.8 LBS

## 2018-01-01 VITALS
SYSTOLIC BLOOD PRESSURE: 122 MMHG | TEMPERATURE: 98.1 F | HEART RATE: 78 BPM | BODY MASS INDEX: 25.36 KG/M2 | OXYGEN SATURATION: 90 % | DIASTOLIC BLOOD PRESSURE: 70 MMHG | RESPIRATION RATE: 18 BRPM | WEIGHT: 134.2 LBS

## 2018-01-01 VITALS
HEIGHT: 60 IN | HEART RATE: 88 BPM | SYSTOLIC BLOOD PRESSURE: 160 MMHG | BODY MASS INDEX: 26.42 KG/M2 | OXYGEN SATURATION: 93 % | DIASTOLIC BLOOD PRESSURE: 80 MMHG | WEIGHT: 134.6 LBS | RESPIRATION RATE: 16 BRPM

## 2018-01-01 VITALS
OXYGEN SATURATION: 93 % | RESPIRATION RATE: 20 BRPM | TEMPERATURE: 98.6 F | HEART RATE: 79 BPM | DIASTOLIC BLOOD PRESSURE: 80 MMHG | WEIGHT: 133 LBS | BODY MASS INDEX: 25.13 KG/M2 | SYSTOLIC BLOOD PRESSURE: 150 MMHG

## 2018-01-01 VITALS
HEART RATE: 88 BPM | WEIGHT: 135.4 LBS | OXYGEN SATURATION: 80 % | BODY MASS INDEX: 25.57 KG/M2 | HEIGHT: 61 IN | DIASTOLIC BLOOD PRESSURE: 60 MMHG | SYSTOLIC BLOOD PRESSURE: 120 MMHG | RESPIRATION RATE: 16 BRPM

## 2018-01-01 VITALS
SYSTOLIC BLOOD PRESSURE: 180 MMHG | DIASTOLIC BLOOD PRESSURE: 86 MMHG | HEART RATE: 84 BPM | RESPIRATION RATE: 18 BRPM | TEMPERATURE: 98.1 F | OXYGEN SATURATION: 93 %

## 2018-01-01 VITALS
DIASTOLIC BLOOD PRESSURE: 88 MMHG | TEMPERATURE: 98.7 F | SYSTOLIC BLOOD PRESSURE: 177 MMHG | RESPIRATION RATE: 18 BRPM | HEART RATE: 87 BPM | OXYGEN SATURATION: 96 %

## 2018-01-01 VITALS
TEMPERATURE: 98.1 F | OXYGEN SATURATION: 90 % | SYSTOLIC BLOOD PRESSURE: 140 MMHG | BODY MASS INDEX: 24.94 KG/M2 | RESPIRATION RATE: 18 BRPM | HEART RATE: 64 BPM | DIASTOLIC BLOOD PRESSURE: 60 MMHG | WEIGHT: 132 LBS

## 2018-01-01 VITALS — DIASTOLIC BLOOD PRESSURE: 82 MMHG | SYSTOLIC BLOOD PRESSURE: 156 MMHG | OXYGEN SATURATION: 92 % | HEART RATE: 84 BPM

## 2018-01-01 VITALS
RESPIRATION RATE: 18 BRPM | WEIGHT: 134.8 LBS | OXYGEN SATURATION: 93 % | BODY MASS INDEX: 25.47 KG/M2 | HEART RATE: 92 BPM | TEMPERATURE: 98.1 F | DIASTOLIC BLOOD PRESSURE: 62 MMHG | SYSTOLIC BLOOD PRESSURE: 132 MMHG

## 2018-01-01 VITALS
HEART RATE: 78 BPM | WEIGHT: 132.8 LBS | TEMPERATURE: 98.2 F | OXYGEN SATURATION: 91 % | DIASTOLIC BLOOD PRESSURE: 72 MMHG | RESPIRATION RATE: 18 BRPM | BODY MASS INDEX: 25.09 KG/M2 | SYSTOLIC BLOOD PRESSURE: 138 MMHG

## 2018-01-01 VITALS
RESPIRATION RATE: 18 BRPM | OXYGEN SATURATION: 90 % | TEMPERATURE: 98.1 F | DIASTOLIC BLOOD PRESSURE: 88 MMHG | SYSTOLIC BLOOD PRESSURE: 163 MMHG

## 2018-01-01 VITALS — OXYGEN SATURATION: 95 % | HEART RATE: 90 BPM | TEMPERATURE: 97.5 F | RESPIRATION RATE: 18 BRPM

## 2018-01-01 VITALS — OXYGEN SATURATION: 91 % | DIASTOLIC BLOOD PRESSURE: 90 MMHG | HEART RATE: 86 BPM | SYSTOLIC BLOOD PRESSURE: 156 MMHG

## 2018-01-01 VITALS
BODY MASS INDEX: 25.51 KG/M2 | HEART RATE: 88 BPM | SYSTOLIC BLOOD PRESSURE: 124 MMHG | TEMPERATURE: 98.1 F | OXYGEN SATURATION: 98 % | DIASTOLIC BLOOD PRESSURE: 78 MMHG | RESPIRATION RATE: 18 BRPM | WEIGHT: 135 LBS

## 2018-01-01 VITALS — HEART RATE: 80 BPM | OXYGEN SATURATION: 93 % | RESPIRATION RATE: 18 BRPM | TEMPERATURE: 98.8 F

## 2018-01-01 VITALS
TEMPERATURE: 98.4 F | RESPIRATION RATE: 16 BRPM | DIASTOLIC BLOOD PRESSURE: 61 MMHG | OXYGEN SATURATION: 92 % | HEART RATE: 84 BPM | HEIGHT: 60 IN | WEIGHT: 141.09 LBS | BODY MASS INDEX: 27.7 KG/M2 | SYSTOLIC BLOOD PRESSURE: 156 MMHG

## 2018-01-01 VITALS
TEMPERATURE: 98.3 F | HEART RATE: 83 BPM | OXYGEN SATURATION: 93 % | SYSTOLIC BLOOD PRESSURE: 166 MMHG | DIASTOLIC BLOOD PRESSURE: 73 MMHG | RESPIRATION RATE: 18 BRPM

## 2018-01-01 VITALS
HEART RATE: 82 BPM | SYSTOLIC BLOOD PRESSURE: 150 MMHG | RESPIRATION RATE: 18 BRPM | OXYGEN SATURATION: 93 % | TEMPERATURE: 98.4 F | DIASTOLIC BLOOD PRESSURE: 70 MMHG

## 2018-01-01 VITALS
OXYGEN SATURATION: 94 % | SYSTOLIC BLOOD PRESSURE: 140 MMHG | RESPIRATION RATE: 16 BRPM | HEART RATE: 80 BPM | DIASTOLIC BLOOD PRESSURE: 90 MMHG | TEMPERATURE: 98 F

## 2018-01-01 DIAGNOSIS — I10 ESSENTIAL HYPERTENSION: ICD-10-CM

## 2018-01-01 DIAGNOSIS — J44.9 COPD, SEVERE (HCC): Chronic | ICD-10-CM

## 2018-01-01 DIAGNOSIS — R06.09 DOE (DYSPNEA ON EXERTION): ICD-10-CM

## 2018-01-01 DIAGNOSIS — J44.9 CHRONIC OBSTRUCTIVE PULMONARY DISEASE, UNSPECIFIED COPD TYPE (HCC): ICD-10-CM

## 2018-01-01 DIAGNOSIS — I27.20 PULMONARY HYPERTENSION (HCC): ICD-10-CM

## 2018-01-01 DIAGNOSIS — I27.81 COR PULMONALE (HCC): Primary | ICD-10-CM

## 2018-01-01 DIAGNOSIS — R79.89 ELEVATED BRAIN NATRIURETIC PEPTIDE (BNP) LEVEL: ICD-10-CM

## 2018-01-01 DIAGNOSIS — I50.42 CHRONIC COMBINED SYSTOLIC AND DIASTOLIC CHF (CONGESTIVE HEART FAILURE) (HCC): Primary | Chronic | ICD-10-CM

## 2018-01-01 DIAGNOSIS — I50.9 ACUTE ON CHRONIC CONGESTIVE HEART FAILURE, UNSPECIFIED HEART FAILURE TYPE (HCC): Primary | ICD-10-CM

## 2018-01-01 DIAGNOSIS — N17.9 AKI (ACUTE KIDNEY INJURY) (HCC): ICD-10-CM

## 2018-01-01 DIAGNOSIS — I27.81 COR PULMONALE (HCC): ICD-10-CM

## 2018-01-01 LAB
ALBUMIN SERPL-MCNC: 2.8 G/DL (ref 3.5–5)
ALBUMIN SERPL-MCNC: 2.9 G/DL (ref 3.5–5)
ALBUMIN/GLOB SERPL: 0.7 {RATIO} (ref 1.1–2.2)
ALBUMIN/GLOB SERPL: 0.8 {RATIO} (ref 1.1–2.2)
ALP SERPL-CCNC: 84 U/L (ref 45–117)
ALP SERPL-CCNC: 99 U/L (ref 45–117)
ALT SERPL-CCNC: 19 U/L (ref 12–78)
ALT SERPL-CCNC: 24 U/L (ref 12–78)
ANION GAP SERPL CALC-SCNC: 12 MMOL/L (ref 5–15)
ANION GAP SERPL CALC-SCNC: 8 MMOL/L (ref 5–15)
ANION GAP SERPL CALC-SCNC: 9 MMOL/L (ref 5–15)
APPEARANCE UR: CLEAR
ARTERIAL PATENCY WRIST A: YES
AST SERPL-CCNC: 13 U/L (ref 15–37)
AST SERPL-CCNC: 20 U/L (ref 15–37)
ATRIAL RATE: 87 BPM
BACTERIA URNS QL MICRO: NEGATIVE /HPF
BASE EXCESS BLD CALC-SCNC: 3 MMOL/L
BASOPHILS # BLD: 0 K/UL (ref 0–0.1)
BASOPHILS # BLD: 0 K/UL (ref 0–0.1)
BASOPHILS # BLD: 0.1 K/UL (ref 0–0.1)
BASOPHILS NFR BLD: 0 % (ref 0–1)
BASOPHILS NFR BLD: 0 % (ref 0–1)
BASOPHILS NFR BLD: 1 % (ref 0–1)
BDY SITE: ABNORMAL
BILIRUB SERPL-MCNC: 0.3 MG/DL (ref 0.2–1)
BILIRUB SERPL-MCNC: 0.3 MG/DL (ref 0.2–1)
BILIRUB UR QL: NEGATIVE
BNP SERPL-MCNC: 4392 PG/ML (ref 0–450)
BNP SERPL-MCNC: 8990 PG/ML (ref 0–450)
BNP SERPL-MCNC: ABNORMAL PG/ML (ref 0–450)
BUN SERPL-MCNC: 29 MG/DL (ref 6–20)
BUN SERPL-MCNC: 32 MG/DL (ref 6–20)
BUN SERPL-MCNC: 39 MG/DL (ref 6–20)
BUN SERPL-MCNC: 39 MG/DL (ref 6–20)
BUN SERPL-MCNC: 44 MG/DL (ref 6–20)
BUN SERPL-MCNC: 51 MG/DL (ref 6–20)
BUN/CREAT SERPL: 19 (ref 12–20)
BUN/CREAT SERPL: 20 (ref 12–20)
BUN/CREAT SERPL: 23 (ref 12–20)
BUN/CREAT SERPL: 23 (ref 12–20)
BUN/CREAT SERPL: 25 (ref 12–20)
BUN/CREAT SERPL: 25 (ref 12–20)
CALCIUM SERPL-MCNC: 8.1 MG/DL (ref 8.5–10.1)
CALCIUM SERPL-MCNC: 8.4 MG/DL (ref 8.5–10.1)
CALCIUM SERPL-MCNC: 8.4 MG/DL (ref 8.5–10.1)
CALCIUM SERPL-MCNC: 8.5 MG/DL (ref 8.5–10.1)
CALCULATED P AXIS, ECG09: 60 DEGREES
CALCULATED R AXIS, ECG10: 156 DEGREES
CALCULATED T AXIS, ECG11: 14 DEGREES
CHLORIDE SERPL-SCNC: 100 MMOL/L (ref 97–108)
CHLORIDE SERPL-SCNC: 101 MMOL/L (ref 97–108)
CHLORIDE SERPL-SCNC: 104 MMOL/L (ref 97–108)
CHLORIDE SERPL-SCNC: 98 MMOL/L (ref 97–108)
CHLORIDE SERPL-SCNC: 99 MMOL/L (ref 97–108)
CHLORIDE SERPL-SCNC: 99 MMOL/L (ref 97–108)
CO2 SERPL-SCNC: 24 MMOL/L (ref 21–32)
CO2 SERPL-SCNC: 27 MMOL/L (ref 21–32)
CO2 SERPL-SCNC: 28 MMOL/L (ref 21–32)
CO2 SERPL-SCNC: 29 MMOL/L (ref 21–32)
CO2 SERPL-SCNC: 30 MMOL/L (ref 21–32)
CO2 SERPL-SCNC: 30 MMOL/L (ref 21–32)
COLOR UR: ABNORMAL
CREAT SERPL-MCNC: 1.46 MG/DL (ref 0.55–1.02)
CREAT SERPL-MCNC: 1.71 MG/DL (ref 0.55–1.02)
CREAT SERPL-MCNC: 1.71 MG/DL (ref 0.55–1.02)
CREAT SERPL-MCNC: 1.73 MG/DL (ref 0.55–1.02)
CREAT SERPL-MCNC: 1.78 MG/DL (ref 0.55–1.02)
CREAT SERPL-MCNC: 2.08 MG/DL (ref 0.55–1.02)
DIAGNOSIS, 93000: NORMAL
DIFFERENTIAL METHOD BLD: ABNORMAL
DIGOXIN SERPL-MCNC: 0.7 NG/ML (ref 0.9–2)
EOSINOPHIL # BLD: 0.2 K/UL (ref 0–0.4)
EOSINOPHIL # BLD: 0.2 K/UL (ref 0–0.4)
EOSINOPHIL # BLD: 0.3 K/UL (ref 0–0.4)
EOSINOPHIL NFR BLD: 2 % (ref 0–7)
EOSINOPHIL NFR BLD: 2 % (ref 0–7)
EOSINOPHIL NFR BLD: 3 % (ref 0–7)
EPITH CASTS URNS QL MICRO: ABNORMAL /LPF
ERYTHROCYTE [DISTWIDTH] IN BLOOD BY AUTOMATED COUNT: 17 % (ref 11.5–14.5)
ERYTHROCYTE [DISTWIDTH] IN BLOOD BY AUTOMATED COUNT: 17.1 % (ref 11.5–14.5)
ERYTHROCYTE [DISTWIDTH] IN BLOOD BY AUTOMATED COUNT: 17.5 % (ref 11.5–14.5)
EST. AVERAGE GLUCOSE BLD GHB EST-MCNC: 226 MG/DL
GAS FLOW.O2 O2 DELIVERY SYS: ABNORMAL L/MIN
GAS FLOW.O2 SETTING OXYMISER: 2 L/M
GLOBULIN SER CALC-MCNC: 3.7 G/DL (ref 2–4)
GLOBULIN SER CALC-MCNC: 4.1 G/DL (ref 2–4)
GLUCOSE BLD STRIP.AUTO-MCNC: 104 MG/DL (ref 65–100)
GLUCOSE BLD STRIP.AUTO-MCNC: 127 MG/DL (ref 65–100)
GLUCOSE BLD STRIP.AUTO-MCNC: 145 MG/DL (ref 65–100)
GLUCOSE BLD STRIP.AUTO-MCNC: 151 MG/DL (ref 65–100)
GLUCOSE BLD STRIP.AUTO-MCNC: 174 MG/DL (ref 65–100)
GLUCOSE BLD STRIP.AUTO-MCNC: 176 MG/DL (ref 65–100)
GLUCOSE BLD STRIP.AUTO-MCNC: 180 MG/DL (ref 65–100)
GLUCOSE BLD STRIP.AUTO-MCNC: 184 MG/DL (ref 65–100)
GLUCOSE BLD STRIP.AUTO-MCNC: 216 MG/DL (ref 65–100)
GLUCOSE BLD STRIP.AUTO-MCNC: 225 MG/DL (ref 65–100)
GLUCOSE BLD STRIP.AUTO-MCNC: 229 MG/DL (ref 65–100)
GLUCOSE BLD STRIP.AUTO-MCNC: 230 MG/DL (ref 65–100)
GLUCOSE BLD STRIP.AUTO-MCNC: 247 MG/DL (ref 65–100)
GLUCOSE BLD STRIP.AUTO-MCNC: 257 MG/DL (ref 65–100)
GLUCOSE BLD STRIP.AUTO-MCNC: 286 MG/DL (ref 65–100)
GLUCOSE BLD STRIP.AUTO-MCNC: 301 MG/DL (ref 65–100)
GLUCOSE BLD STRIP.AUTO-MCNC: 304 MG/DL (ref 65–100)
GLUCOSE BLD STRIP.AUTO-MCNC: 380 MG/DL (ref 65–100)
GLUCOSE BLD STRIP.AUTO-MCNC: 63 MG/DL (ref 65–100)
GLUCOSE BLD STRIP.AUTO-MCNC: 64 MG/DL (ref 65–100)
GLUCOSE SERPL-MCNC: 150 MG/DL (ref 65–100)
GLUCOSE SERPL-MCNC: 169 MG/DL (ref 65–100)
GLUCOSE SERPL-MCNC: 287 MG/DL (ref 65–100)
GLUCOSE SERPL-MCNC: 458 MG/DL (ref 65–100)
GLUCOSE SERPL-MCNC: 60 MG/DL (ref 65–100)
GLUCOSE SERPL-MCNC: 89 MG/DL (ref 65–100)
GLUCOSE UR STRIP.AUTO-MCNC: >1000 MG/DL
HBA1C MFR BLD: 9.5 % (ref 4.2–6.3)
HCO3 BLD-SCNC: 27.4 MMOL/L (ref 22–26)
HCT VFR BLD AUTO: 39 % (ref 35–47)
HCT VFR BLD AUTO: 39.2 % (ref 35–47)
HCT VFR BLD AUTO: 40 % (ref 35–47)
HGB BLD-MCNC: 12.6 G/DL (ref 11.5–16)
HGB BLD-MCNC: 12.7 G/DL (ref 11.5–16)
HGB BLD-MCNC: 12.7 G/DL (ref 11.5–16)
HGB UR QL STRIP: ABNORMAL
HYALINE CASTS URNS QL MICRO: ABNORMAL /LPF (ref 0–5)
IMM GRANULOCYTES # BLD: 0.1 K/UL (ref 0–0.04)
IMM GRANULOCYTES NFR BLD AUTO: 1 % (ref 0–0.5)
KETONES UR QL STRIP.AUTO: NEGATIVE MG/DL
LEUKOCYTE ESTERASE UR QL STRIP.AUTO: NEGATIVE
LIPASE SERPL-CCNC: 178 U/L (ref 73–393)
LYMPHOCYTES # BLD: 1.1 K/UL (ref 0.8–3.5)
LYMPHOCYTES # BLD: 1.7 K/UL (ref 0.8–3.5)
LYMPHOCYTES # BLD: 2.2 K/UL (ref 0.8–3.5)
LYMPHOCYTES NFR BLD: 12 % (ref 12–49)
LYMPHOCYTES NFR BLD: 14 % (ref 12–49)
LYMPHOCYTES NFR BLD: 23 % (ref 12–49)
MAGNESIUM SERPL-MCNC: 2.2 MG/DL (ref 1.6–2.4)
MCH RBC QN AUTO: 31.5 PG (ref 26–34)
MCH RBC QN AUTO: 31.7 PG (ref 26–34)
MCH RBC QN AUTO: 31.7 PG (ref 26–34)
MCHC RBC AUTO-ENTMCNC: 31.8 G/DL (ref 30–36.5)
MCHC RBC AUTO-ENTMCNC: 32.1 G/DL (ref 30–36.5)
MCHC RBC AUTO-ENTMCNC: 32.6 G/DL (ref 30–36.5)
MCV RBC AUTO: 97.3 FL (ref 80–99)
MCV RBC AUTO: 98.7 FL (ref 80–99)
MCV RBC AUTO: 99.3 FL (ref 80–99)
MONOCYTES # BLD: 0.5 K/UL (ref 0–1)
MONOCYTES # BLD: 0.7 K/UL (ref 0–1)
MONOCYTES # BLD: 1.1 K/UL (ref 0–1)
MONOCYTES NFR BLD: 6 % (ref 5–13)
MONOCYTES NFR BLD: 7 % (ref 5–13)
MONOCYTES NFR BLD: 9 % (ref 5–13)
NEUTS SEG # BLD: 6.6 K/UL (ref 1.8–8)
NEUTS SEG # BLD: 6.7 K/UL (ref 1.8–8)
NEUTS SEG # BLD: 9 K/UL (ref 1.8–8)
NEUTS SEG NFR BLD: 67 % (ref 32–75)
NEUTS SEG NFR BLD: 74 % (ref 32–75)
NEUTS SEG NFR BLD: 78 % (ref 32–75)
NITRITE UR QL STRIP.AUTO: NEGATIVE
NRBC # BLD: 0 K/UL (ref 0–0.01)
NRBC BLD-RTO: 0 PER 100 WBC
O2/TOTAL GAS SETTING VFR VENT: 28 %
P-R INTERVAL, ECG05: 150 MS
PCO2 BLD: 44.7 MMHG (ref 35–45)
PH BLD: 7.39 [PH] (ref 7.35–7.45)
PH UR STRIP: 6.5 [PH] (ref 5–8)
PHOSPHATE SERPL-MCNC: 3.6 MG/DL (ref 2.6–4.7)
PLATELET # BLD AUTO: 167 K/UL (ref 150–400)
PLATELET # BLD AUTO: 178 K/UL (ref 150–400)
PLATELET # BLD AUTO: 189 K/UL (ref 150–400)
PMV BLD AUTO: 10.6 FL (ref 8.9–12.9)
PMV BLD AUTO: 10.7 FL (ref 8.9–12.9)
PMV BLD AUTO: 11.1 FL (ref 8.9–12.9)
PO2 BLD: 59 MMHG (ref 80–100)
POTASSIUM SERPL-SCNC: 3.4 MMOL/L (ref 3.5–5.1)
POTASSIUM SERPL-SCNC: 4.2 MMOL/L (ref 3.5–5.1)
POTASSIUM SERPL-SCNC: 4.4 MMOL/L (ref 3.5–5.1)
POTASSIUM SERPL-SCNC: 4.6 MMOL/L (ref 3.5–5.1)
POTASSIUM SERPL-SCNC: 4.7 MMOL/L (ref 3.5–5.1)
POTASSIUM SERPL-SCNC: 4.8 MMOL/L (ref 3.5–5.1)
PROT SERPL-MCNC: 6.5 G/DL (ref 6.4–8.2)
PROT SERPL-MCNC: 7 G/DL (ref 6.4–8.2)
PROT UR STRIP-MCNC: 100 MG/DL
Q-T INTERVAL, ECG07: 388 MS
QRS DURATION, ECG06: 100 MS
QTC CALCULATION (BEZET), ECG08: 466 MS
RBC # BLD AUTO: 3.97 M/UL (ref 3.8–5.2)
RBC # BLD AUTO: 4.01 M/UL (ref 3.8–5.2)
RBC # BLD AUTO: 4.03 M/UL (ref 3.8–5.2)
RBC #/AREA URNS HPF: ABNORMAL /HPF (ref 0–5)
SAO2 % BLD: 90 % (ref 92–97)
SERVICE CMNT-IMP: ABNORMAL
SODIUM SERPL-SCNC: 135 MMOL/L (ref 136–145)
SODIUM SERPL-SCNC: 135 MMOL/L (ref 136–145)
SODIUM SERPL-SCNC: 136 MMOL/L (ref 136–145)
SODIUM SERPL-SCNC: 136 MMOL/L (ref 136–145)
SODIUM SERPL-SCNC: 138 MMOL/L (ref 136–145)
SODIUM SERPL-SCNC: 142 MMOL/L (ref 136–145)
SP GR UR REFRACTOMETRY: 1.01 (ref 1–1.03)
SPECIMEN TYPE: ABNORMAL
TOTAL RESP. RATE, ITRR: 18
TROPONIN I SERPL-MCNC: <0.04 NG/ML
TROPONIN I SERPL-MCNC: <0.04 NG/ML
UROBILINOGEN UR QL STRIP.AUTO: 0.2 EU/DL (ref 0.2–1)
VENTRICULAR RATE, ECG03: 87 BPM
WBC # BLD AUTO: 12.2 K/UL (ref 3.6–11)
WBC # BLD AUTO: 8.6 K/UL (ref 3.6–11)
WBC # BLD AUTO: 9.9 K/UL (ref 3.6–11)
WBC URNS QL MICRO: ABNORMAL /HPF (ref 0–4)

## 2018-01-01 PROCEDURE — G0152 HHCP-SERV OF OT,EA 15 MIN: HCPCS

## 2018-01-01 PROCEDURE — 3331090002 HH PPS REVENUE DEBIT

## 2018-01-01 PROCEDURE — 74011250637 HC RX REV CODE- 250/637: Performed by: INTERNAL MEDICINE

## 2018-01-01 PROCEDURE — 84100 ASSAY OF PHOSPHORUS: CPT | Performed by: HOSPITALIST

## 2018-01-01 PROCEDURE — 74011250636 HC RX REV CODE- 250/636: Performed by: HOSPITALIST

## 2018-01-01 PROCEDURE — 3331090001 HH PPS REVENUE CREDIT

## 2018-01-01 PROCEDURE — 36415 COLL VENOUS BLD VENIPUNCTURE: CPT | Performed by: EMERGENCY MEDICINE

## 2018-01-01 PROCEDURE — 74011636637 HC RX REV CODE- 636/637: Performed by: HOSPITALIST

## 2018-01-01 PROCEDURE — 36415 COLL VENOUS BLD VENIPUNCTURE: CPT | Performed by: HOSPITALIST

## 2018-01-01 PROCEDURE — 80053 COMPREHEN METABOLIC PANEL: CPT | Performed by: HOSPITALIST

## 2018-01-01 PROCEDURE — 94640 AIRWAY INHALATION TREATMENT: CPT

## 2018-01-01 PROCEDURE — G0157 HHC PT ASSISTANT EA 15: HCPCS

## 2018-01-01 PROCEDURE — 74011250637 HC RX REV CODE- 250/637: Performed by: HOSPITALIST

## 2018-01-01 PROCEDURE — 85025 COMPLETE CBC W/AUTO DIFF WBC: CPT | Performed by: HOSPITALIST

## 2018-01-01 PROCEDURE — G0156 HHCP-SVS OF AIDE,EA 15 MIN: HCPCS

## 2018-01-01 PROCEDURE — 74011250636 HC RX REV CODE- 250/636: Performed by: EMERGENCY MEDICINE

## 2018-01-01 PROCEDURE — 74011250637 HC RX REV CODE- 250/637: Performed by: PHYSICIAN ASSISTANT

## 2018-01-01 PROCEDURE — 82803 BLOOD GASES ANY COMBINATION: CPT

## 2018-01-01 PROCEDURE — G0300 HHS/HOSPICE OF LPN EA 15 MIN: HCPCS

## 2018-01-01 PROCEDURE — 81001 URINALYSIS AUTO W/SCOPE: CPT | Performed by: EMERGENCY MEDICINE

## 2018-01-01 PROCEDURE — 96374 THER/PROPH/DIAG INJ IV PUSH: CPT

## 2018-01-01 PROCEDURE — 97165 OT EVAL LOW COMPLEX 30 MIN: CPT

## 2018-01-01 PROCEDURE — 65660000000 HC RM CCU STEPDOWN

## 2018-01-01 PROCEDURE — 80048 BASIC METABOLIC PNL TOTAL CA: CPT | Performed by: HOSPITALIST

## 2018-01-01 PROCEDURE — 74011000250 HC RX REV CODE- 250: Performed by: HOSPITALIST

## 2018-01-01 PROCEDURE — 77010033678 HC OXYGEN DAILY

## 2018-01-01 PROCEDURE — 65270000032 HC RM SEMIPRIVATE

## 2018-01-01 PROCEDURE — 80053 COMPREHEN METABOLIC PANEL: CPT | Performed by: EMERGENCY MEDICINE

## 2018-01-01 PROCEDURE — 82962 GLUCOSE BLOOD TEST: CPT

## 2018-01-01 PROCEDURE — 97161 PT EVAL LOW COMPLEX 20 MIN: CPT

## 2018-01-01 PROCEDURE — 83036 HEMOGLOBIN GLYCOSYLATED A1C: CPT | Performed by: HOSPITALIST

## 2018-01-01 PROCEDURE — G0151 HHCP-SERV OF PT,EA 15 MIN: HCPCS

## 2018-01-01 PROCEDURE — 83735 ASSAY OF MAGNESIUM: CPT | Performed by: HOSPITALIST

## 2018-01-01 PROCEDURE — 83880 ASSAY OF NATRIURETIC PEPTIDE: CPT | Performed by: HOSPITALIST

## 2018-01-01 PROCEDURE — 93005 ELECTROCARDIOGRAM TRACING: CPT

## 2018-01-01 PROCEDURE — 97530 THERAPEUTIC ACTIVITIES: CPT

## 2018-01-01 PROCEDURE — 99285 EMERGENCY DEPT VISIT HI MDM: CPT

## 2018-01-01 PROCEDURE — 36600 WITHDRAWAL OF ARTERIAL BLOOD: CPT

## 2018-01-01 PROCEDURE — 84484 ASSAY OF TROPONIN QUANT: CPT | Performed by: EMERGENCY MEDICINE

## 2018-01-01 PROCEDURE — 97116 GAIT TRAINING THERAPY: CPT

## 2018-01-01 PROCEDURE — 83690 ASSAY OF LIPASE: CPT | Performed by: EMERGENCY MEDICINE

## 2018-01-01 PROCEDURE — 83880 ASSAY OF NATRIURETIC PEPTIDE: CPT | Performed by: EMERGENCY MEDICINE

## 2018-01-01 PROCEDURE — 84484 ASSAY OF TROPONIN QUANT: CPT | Performed by: HOSPITALIST

## 2018-01-01 PROCEDURE — G0155 HHCP-SVS OF CSW,EA 15 MIN: HCPCS

## 2018-01-01 PROCEDURE — 77030029684 HC NEB SM VOL KT MONA -A

## 2018-01-01 PROCEDURE — 97535 SELF CARE MNGMENT TRAINING: CPT

## 2018-01-01 PROCEDURE — 85025 COMPLETE CBC W/AUTO DIFF WBC: CPT | Performed by: EMERGENCY MEDICINE

## 2018-01-01 PROCEDURE — 94664 DEMO&/EVAL PT USE INHALER: CPT

## 2018-01-01 PROCEDURE — G0299 HHS/HOSPICE OF RN EA 15 MIN: HCPCS

## 2018-01-01 PROCEDURE — 36415 COLL VENOUS BLD VENIPUNCTURE: CPT | Performed by: INTERNAL MEDICINE

## 2018-01-01 PROCEDURE — 93306 TTE W/DOPPLER COMPLETE: CPT

## 2018-01-01 PROCEDURE — 65270000029 HC RM PRIVATE

## 2018-01-01 PROCEDURE — 71046 X-RAY EXAM CHEST 2 VIEWS: CPT

## 2018-01-01 PROCEDURE — 400013 HH SOC

## 2018-01-01 PROCEDURE — 80162 ASSAY OF DIGOXIN TOTAL: CPT | Performed by: INTERNAL MEDICINE

## 2018-01-01 RX ORDER — FUROSEMIDE 10 MG/ML
80 INJECTION INTRAMUSCULAR; INTRAVENOUS
Status: COMPLETED | OUTPATIENT
Start: 2018-01-01 | End: 2018-01-01

## 2018-01-01 RX ORDER — LEVOTHYROXINE SODIUM 100 UG/1
100 TABLET ORAL
COMMUNITY

## 2018-01-01 RX ORDER — DEXTROSE 50 % IN WATER (D50W) INTRAVENOUS SYRINGE
12.5-25 AS NEEDED
Status: DISCONTINUED | OUTPATIENT
Start: 2018-01-01 | End: 2018-01-01 | Stop reason: HOSPADM

## 2018-01-01 RX ORDER — FUROSEMIDE 80 MG/1
80 TABLET ORAL DAILY
Qty: 50 TAB | Refills: 3 | Status: SHIPPED | OUTPATIENT
Start: 2018-01-01 | End: 2018-01-01

## 2018-01-01 RX ORDER — DIGOXIN 125 MCG
0.12 TABLET ORAL
Status: DISCONTINUED | OUTPATIENT
Start: 2018-01-01 | End: 2018-01-01

## 2018-01-01 RX ORDER — SODIUM CHLORIDE 0.9 % (FLUSH) 0.9 %
5-10 SYRINGE (ML) INJECTION EVERY 8 HOURS
Status: DISCONTINUED | OUTPATIENT
Start: 2018-01-01 | End: 2018-01-01 | Stop reason: HOSPADM

## 2018-01-01 RX ORDER — DEXTROSE 50 % IN WATER (D50W) INTRAVENOUS SYRINGE
12.5-25 AS NEEDED
Status: DISCONTINUED | OUTPATIENT
Start: 2018-01-01 | End: 2018-01-01 | Stop reason: SDUPTHER

## 2018-01-01 RX ORDER — IPRATROPIUM BROMIDE AND ALBUTEROL SULFATE 2.5; .5 MG/3ML; MG/3ML
3 SOLUTION RESPIRATORY (INHALATION)
Status: DISCONTINUED | OUTPATIENT
Start: 2018-01-01 | End: 2018-01-01

## 2018-01-01 RX ORDER — ACETAMINOPHEN 325 MG/1
650 TABLET ORAL
Status: DISCONTINUED | OUTPATIENT
Start: 2018-01-01 | End: 2018-01-01 | Stop reason: HOSPADM

## 2018-01-01 RX ORDER — INSULIN GLARGINE 100 [IU]/ML
10 INJECTION, SOLUTION SUBCUTANEOUS 2 TIMES DAILY
Status: DISCONTINUED | OUTPATIENT
Start: 2018-01-01 | End: 2018-01-01 | Stop reason: HOSPADM

## 2018-01-01 RX ORDER — MAGNESIUM SULFATE 100 %
4 CRYSTALS MISCELLANEOUS AS NEEDED
Status: DISCONTINUED | OUTPATIENT
Start: 2018-01-01 | End: 2018-01-01 | Stop reason: SDUPTHER

## 2018-01-01 RX ORDER — CARVEDILOL 12.5 MG/1
12.5 TABLET ORAL 2 TIMES DAILY WITH MEALS
Status: DISCONTINUED | OUTPATIENT
Start: 2018-01-01 | End: 2018-01-01 | Stop reason: HOSPADM

## 2018-01-01 RX ORDER — BUMETANIDE 2 MG/1
1 TABLET ORAL DAILY
COMMUNITY
End: 2019-01-01

## 2018-01-01 RX ORDER — LEVOTHYROXINE SODIUM 100 UG/1
200 TABLET ORAL
COMMUNITY

## 2018-01-01 RX ORDER — MAGNESIUM SULFATE 100 %
4 CRYSTALS MISCELLANEOUS AS NEEDED
Status: DISCONTINUED | OUTPATIENT
Start: 2018-01-01 | End: 2018-01-01 | Stop reason: HOSPADM

## 2018-01-01 RX ORDER — NALOXONE HYDROCHLORIDE 0.4 MG/ML
0.4 INJECTION, SOLUTION INTRAMUSCULAR; INTRAVENOUS; SUBCUTANEOUS AS NEEDED
Status: DISCONTINUED | OUTPATIENT
Start: 2018-01-01 | End: 2018-01-01 | Stop reason: HOSPADM

## 2018-01-01 RX ORDER — INSULIN GLARGINE 100 [IU]/ML
2 INJECTION, SOLUTION SUBCUTANEOUS DAILY
COMMUNITY

## 2018-01-01 RX ORDER — POTASSIUM CHLORIDE 14.9 MG/ML
10 INJECTION INTRAVENOUS
Status: COMPLETED | OUTPATIENT
Start: 2018-01-01 | End: 2018-01-01

## 2018-01-01 RX ORDER — INSULIN LISPRO 100 [IU]/ML
INJECTION, SOLUTION INTRAVENOUS; SUBCUTANEOUS
Status: DISCONTINUED | OUTPATIENT
Start: 2018-01-01 | End: 2018-01-01

## 2018-01-01 RX ORDER — ASPIRIN 81 MG/1
81 TABLET ORAL DAILY
Status: DISCONTINUED | OUTPATIENT
Start: 2018-01-01 | End: 2018-01-01 | Stop reason: HOSPADM

## 2018-01-01 RX ORDER — DIGOXIN 125 MCG
0.12 TABLET ORAL EVERY OTHER DAY
COMMUNITY
End: 2018-01-01

## 2018-01-01 RX ORDER — LEVOTHYROXINE SODIUM 50 UG/1
100 TABLET ORAL
Status: DISCONTINUED | OUTPATIENT
Start: 2018-01-01 | End: 2018-01-01 | Stop reason: HOSPADM

## 2018-01-01 RX ORDER — ZOLPIDEM TARTRATE 5 MG/1
5 TABLET ORAL
Status: DISCONTINUED | OUTPATIENT
Start: 2018-01-01 | End: 2018-01-01 | Stop reason: HOSPADM

## 2018-01-01 RX ORDER — ONDANSETRON 2 MG/ML
4 INJECTION INTRAMUSCULAR; INTRAVENOUS
Status: DISCONTINUED | OUTPATIENT
Start: 2018-01-01 | End: 2018-01-01 | Stop reason: HOSPADM

## 2018-01-01 RX ORDER — FUROSEMIDE 10 MG/ML
80 INJECTION INTRAMUSCULAR; INTRAVENOUS 2 TIMES DAILY
Status: DISCONTINUED | OUTPATIENT
Start: 2018-01-01 | End: 2018-01-01

## 2018-01-01 RX ORDER — HYDRALAZINE HYDROCHLORIDE 25 MG/1
25 TABLET, FILM COATED ORAL 3 TIMES DAILY
Status: DISCONTINUED | OUTPATIENT
Start: 2018-01-01 | End: 2018-01-01 | Stop reason: HOSPADM

## 2018-01-01 RX ORDER — CARVEDILOL 6.25 MG/1
6.25 TABLET ORAL 2 TIMES DAILY WITH MEALS
COMMUNITY

## 2018-01-01 RX ORDER — ENOXAPARIN SODIUM 100 MG/ML
30 INJECTION SUBCUTANEOUS EVERY 24 HOURS
Status: DISCONTINUED | OUTPATIENT
Start: 2018-01-01 | End: 2018-01-01 | Stop reason: HOSPADM

## 2018-01-01 RX ORDER — LISINOPRIL 5 MG/1
5 TABLET ORAL 2 TIMES DAILY
Status: CANCELLED | OUTPATIENT
Start: 2018-01-01

## 2018-01-01 RX ORDER — FUROSEMIDE 40 MG/1
40 TABLET ORAL
Qty: 48 TAB | Refills: 3 | Status: SHIPPED | OUTPATIENT
Start: 2018-01-01 | End: 2018-01-01

## 2018-01-01 RX ORDER — INSULIN LISPRO 100 [IU]/ML
12 INJECTION, SOLUTION INTRAVENOUS; SUBCUTANEOUS ONCE
Status: COMPLETED | OUTPATIENT
Start: 2018-01-01 | End: 2018-01-01

## 2018-01-01 RX ORDER — LISINOPRIL 5 MG/1
5 TABLET ORAL 2 TIMES DAILY
COMMUNITY
End: 2018-01-01

## 2018-01-01 RX ORDER — BUMETANIDE 1 MG/1
2 TABLET ORAL DAILY
Status: DISCONTINUED | OUTPATIENT
Start: 2018-01-01 | End: 2018-01-01 | Stop reason: HOSPADM

## 2018-01-01 RX ORDER — SODIUM CHLORIDE 0.9 % (FLUSH) 0.9 %
5-10 SYRINGE (ML) INJECTION AS NEEDED
Status: DISCONTINUED | OUTPATIENT
Start: 2018-01-01 | End: 2018-01-01 | Stop reason: HOSPADM

## 2018-01-01 RX ORDER — POTASSIUM CHLORIDE 750 MG/1
40 TABLET, FILM COATED, EXTENDED RELEASE ORAL 2 TIMES DAILY
Status: COMPLETED | OUTPATIENT
Start: 2018-01-01 | End: 2018-01-01

## 2018-01-01 RX ORDER — INSULIN LISPRO 100 [IU]/ML
INJECTION, SOLUTION INTRAVENOUS; SUBCUTANEOUS
Status: DISCONTINUED | OUTPATIENT
Start: 2018-01-01 | End: 2018-01-01 | Stop reason: HOSPADM

## 2018-01-01 RX ORDER — IPRATROPIUM BROMIDE AND ALBUTEROL SULFATE 2.5; .5 MG/3ML; MG/3ML
3 SOLUTION RESPIRATORY (INHALATION)
Status: DISCONTINUED | OUTPATIENT
Start: 2018-01-01 | End: 2018-01-01 | Stop reason: HOSPADM

## 2018-01-01 RX ORDER — HYDRALAZINE HYDROCHLORIDE 25 MG/1
25 TABLET, FILM COATED ORAL 3 TIMES DAILY
Qty: 90 TAB | Refills: 0 | Status: SHIPPED
Start: 2018-01-01 | End: 2018-01-01

## 2018-01-01 RX ORDER — CARVEDILOL 6.25 MG/1
6.25 TABLET ORAL 2 TIMES DAILY WITH MEALS
Status: DISCONTINUED | OUTPATIENT
Start: 2018-01-01 | End: 2018-01-01

## 2018-01-01 RX ORDER — HYDRALAZINE HYDROCHLORIDE 20 MG/ML
10 INJECTION INTRAMUSCULAR; INTRAVENOUS ONCE
Status: COMPLETED | OUTPATIENT
Start: 2018-01-01 | End: 2018-01-01

## 2018-01-01 RX ADMIN — INSULIN LISPRO 7 UNITS: 100 INJECTION, SOLUTION INTRAVENOUS; SUBCUTANEOUS at 12:04

## 2018-01-01 RX ADMIN — Medication 10 ML: at 21:05

## 2018-01-01 RX ADMIN — LEVOTHYROXINE SODIUM 100 MCG: 100 TABLET ORAL at 06:56

## 2018-01-01 RX ADMIN — IPRATROPIUM BROMIDE AND ALBUTEROL SULFATE 3 ML: .5; 3 SOLUTION RESPIRATORY (INHALATION) at 08:14

## 2018-01-01 RX ADMIN — HYDRALAZINE HYDROCHLORIDE 25 MG: 25 TABLET ORAL at 21:57

## 2018-01-01 RX ADMIN — INSULIN LISPRO 2 UNITS: 100 INJECTION, SOLUTION INTRAVENOUS; SUBCUTANEOUS at 12:03

## 2018-01-01 RX ADMIN — IPRATROPIUM BROMIDE AND ALBUTEROL SULFATE 3 ML: .5; 3 SOLUTION RESPIRATORY (INHALATION) at 07:11

## 2018-01-01 RX ADMIN — Medication 10 ML: at 07:15

## 2018-01-01 RX ADMIN — Medication 10 ML: at 20:47

## 2018-01-01 RX ADMIN — POTASSIUM CHLORIDE 40 MEQ: 750 TABLET, EXTENDED RELEASE ORAL at 11:33

## 2018-01-01 RX ADMIN — DIGOXIN 0.12 MG: 125 TABLET ORAL at 17:47

## 2018-01-01 RX ADMIN — INSULIN LISPRO 2 UNITS: 100 INJECTION, SOLUTION INTRAVENOUS; SUBCUTANEOUS at 07:08

## 2018-01-01 RX ADMIN — Medication 10 ML: at 06:34

## 2018-01-01 RX ADMIN — Medication 10 ML: at 17:48

## 2018-01-01 RX ADMIN — CARVEDILOL 6.25 MG: 6.25 TABLET, FILM COATED ORAL at 11:33

## 2018-01-01 RX ADMIN — ENOXAPARIN SODIUM 30 MG: 30 INJECTION SUBCUTANEOUS at 21:43

## 2018-01-01 RX ADMIN — INSULIN GLARGINE 10 UNITS: 100 INJECTION, SOLUTION SUBCUTANEOUS at 17:10

## 2018-01-01 RX ADMIN — ASPIRIN 81 MG: 81 TABLET, COATED ORAL at 09:09

## 2018-01-01 RX ADMIN — INSULIN GLARGINE 10 UNITS: 100 INJECTION, SOLUTION SUBCUTANEOUS at 21:04

## 2018-01-01 RX ADMIN — CARVEDILOL 6.25 MG: 6.25 TABLET, FILM COATED ORAL at 17:02

## 2018-01-01 RX ADMIN — DIGOXIN 0.12 MG: 125 TABLET ORAL at 16:49

## 2018-01-01 RX ADMIN — INSULIN GLARGINE 10 UNITS: 100 INJECTION, SOLUTION SUBCUTANEOUS at 22:08

## 2018-01-01 RX ADMIN — HYDRALAZINE HYDROCHLORIDE 25 MG: 25 TABLET ORAL at 09:09

## 2018-01-01 RX ADMIN — INSULIN LISPRO 4 UNITS: 100 INJECTION, SOLUTION INTRAVENOUS; SUBCUTANEOUS at 22:09

## 2018-01-01 RX ADMIN — Medication 10 ML: at 07:09

## 2018-01-01 RX ADMIN — CARVEDILOL 12.5 MG: 12.5 TABLET, FILM COATED ORAL at 08:21

## 2018-01-01 RX ADMIN — IPRATROPIUM BROMIDE AND ALBUTEROL SULFATE 3 ML: .5; 3 SOLUTION RESPIRATORY (INHALATION) at 09:02

## 2018-01-01 RX ADMIN — INSULIN LISPRO 12 UNITS: 100 INJECTION, SOLUTION INTRAVENOUS; SUBCUTANEOUS at 22:08

## 2018-01-01 RX ADMIN — IPRATROPIUM BROMIDE AND ALBUTEROL SULFATE 3 ML: .5; 3 SOLUTION RESPIRATORY (INHALATION) at 02:59

## 2018-01-01 RX ADMIN — INSULIN GLARGINE 10 UNITS: 100 INJECTION, SOLUTION SUBCUTANEOUS at 08:40

## 2018-01-01 RX ADMIN — BUMETANIDE 2 MG: 1 TABLET ORAL at 09:09

## 2018-01-01 RX ADMIN — CARVEDILOL 12.5 MG: 12.5 TABLET, FILM COATED ORAL at 17:47

## 2018-01-01 RX ADMIN — ENOXAPARIN SODIUM 30 MG: 30 INJECTION SUBCUTANEOUS at 20:46

## 2018-01-01 RX ADMIN — ASPIRIN 81 MG: 81 TABLET, COATED ORAL at 08:39

## 2018-01-01 RX ADMIN — INSULIN GLARGINE 10 UNITS: 100 INJECTION, SOLUTION SUBCUTANEOUS at 09:09

## 2018-01-01 RX ADMIN — BUMETANIDE 2 MG: 1 TABLET ORAL at 08:40

## 2018-01-01 RX ADMIN — Medication 10 ML: at 16:50

## 2018-01-01 RX ADMIN — INSULIN LISPRO 1 UNITS: 100 INJECTION, SOLUTION INTRAVENOUS; SUBCUTANEOUS at 21:57

## 2018-01-01 RX ADMIN — ENOXAPARIN SODIUM 30 MG: 30 INJECTION SUBCUTANEOUS at 21:57

## 2018-01-01 RX ADMIN — CARVEDILOL 6.25 MG: 6.25 TABLET, FILM COATED ORAL at 08:57

## 2018-01-01 RX ADMIN — LEVOTHYROXINE SODIUM 100 MCG: 100 TABLET ORAL at 06:33

## 2018-01-01 RX ADMIN — DIGOXIN 0.12 MG: 125 TABLET ORAL at 17:02

## 2018-01-01 RX ADMIN — IPRATROPIUM BROMIDE AND ALBUTEROL SULFATE 3 ML: .5; 3 SOLUTION RESPIRATORY (INHALATION) at 22:40

## 2018-01-01 RX ADMIN — Medication 10 ML: at 21:43

## 2018-01-01 RX ADMIN — INSULIN LISPRO 3 UNITS: 100 INJECTION, SOLUTION INTRAVENOUS; SUBCUTANEOUS at 16:49

## 2018-01-01 RX ADMIN — CARVEDILOL 12.5 MG: 12.5 TABLET, FILM COATED ORAL at 07:15

## 2018-01-01 RX ADMIN — Medication 10 ML: at 06:57

## 2018-01-01 RX ADMIN — IPRATROPIUM BROMIDE AND ALBUTEROL SULFATE 3 ML: .5; 3 SOLUTION RESPIRATORY (INHALATION) at 20:35

## 2018-01-01 RX ADMIN — CARVEDILOL 6.25 MG: 6.25 TABLET, FILM COATED ORAL at 08:39

## 2018-01-01 RX ADMIN — INSULIN GLARGINE 10 UNITS: 100 INJECTION, SOLUTION SUBCUTANEOUS at 08:58

## 2018-01-01 RX ADMIN — HYDRALAZINE HYDROCHLORIDE 25 MG: 25 TABLET ORAL at 10:00

## 2018-01-01 RX ADMIN — FUROSEMIDE 80 MG: 10 INJECTION, SOLUTION INTRAMUSCULAR; INTRAVENOUS at 17:18

## 2018-01-01 RX ADMIN — INSULIN LISPRO 4 UNITS: 100 INJECTION, SOLUTION INTRAVENOUS; SUBCUTANEOUS at 11:56

## 2018-01-01 RX ADMIN — BUMETANIDE 2 MG: 1 TABLET ORAL at 17:02

## 2018-01-01 RX ADMIN — INSULIN LISPRO 3 UNITS: 100 INJECTION, SOLUTION INTRAVENOUS; SUBCUTANEOUS at 17:03

## 2018-01-01 RX ADMIN — INSULIN GLARGINE 10 UNITS: 100 INJECTION, SOLUTION SUBCUTANEOUS at 11:34

## 2018-01-01 RX ADMIN — CARVEDILOL 6.25 MG: 6.25 TABLET, FILM COATED ORAL at 16:49

## 2018-01-01 RX ADMIN — LEVOTHYROXINE SODIUM 100 MCG: 100 TABLET ORAL at 07:08

## 2018-01-01 RX ADMIN — FUROSEMIDE 80 MG: 10 INJECTION, SOLUTION INTRAMUSCULAR; INTRAVENOUS at 07:08

## 2018-01-01 RX ADMIN — LEVOTHYROXINE SODIUM 200 MCG: 200 TABLET ORAL at 06:31

## 2018-01-01 RX ADMIN — BUMETANIDE 2 MG: 1 TABLET ORAL at 08:21

## 2018-01-01 RX ADMIN — CARVEDILOL 12.5 MG: 12.5 TABLET, FILM COATED ORAL at 17:10

## 2018-01-01 RX ADMIN — INSULIN GLARGINE 10 UNITS: 100 INJECTION, SOLUTION SUBCUTANEOUS at 18:05

## 2018-01-01 RX ADMIN — POTASSIUM CHLORIDE 10 MEQ: 200 INJECTION, SOLUTION INTRAVENOUS at 14:52

## 2018-01-01 RX ADMIN — BUMETANIDE 2 MG: 1 TABLET ORAL at 08:57

## 2018-01-01 RX ADMIN — HYDRALAZINE HYDROCHLORIDE 10 MG: 20 INJECTION INTRAMUSCULAR; INTRAVENOUS at 22:08

## 2018-01-01 RX ADMIN — IPRATROPIUM BROMIDE AND ALBUTEROL SULFATE 3 ML: .5; 3 SOLUTION RESPIRATORY (INHALATION) at 08:25

## 2018-01-01 RX ADMIN — INSULIN GLARGINE 10 UNITS: 100 INJECTION, SOLUTION SUBCUTANEOUS at 08:21

## 2018-01-01 RX ADMIN — IPRATROPIUM BROMIDE AND ALBUTEROL SULFATE 3 ML: .5; 3 SOLUTION RESPIRATORY (INHALATION) at 07:47

## 2018-01-01 RX ADMIN — ASPIRIN 81 MG: 81 TABLET, COATED ORAL at 08:57

## 2018-01-01 RX ADMIN — POTASSIUM CHLORIDE 40 MEQ: 750 TABLET, EXTENDED RELEASE ORAL at 17:02

## 2018-01-01 RX ADMIN — IPRATROPIUM BROMIDE AND ALBUTEROL SULFATE 3 ML: .5; 3 SOLUTION RESPIRATORY (INHALATION) at 20:17

## 2018-01-01 RX ADMIN — IPRATROPIUM BROMIDE AND ALBUTEROL SULFATE 3 ML: .5; 3 SOLUTION RESPIRATORY (INHALATION) at 19:32

## 2018-01-01 RX ADMIN — ASPIRIN 81 MG: 81 TABLET, COATED ORAL at 08:21

## 2018-01-01 RX ADMIN — ENOXAPARIN SODIUM 30 MG: 30 INJECTION SUBCUTANEOUS at 21:04

## 2018-01-01 RX ADMIN — ENOXAPARIN SODIUM 30 MG: 30 INJECTION SUBCUTANEOUS at 22:08

## 2018-01-01 RX ADMIN — INSULIN LISPRO 3 UNITS: 100 INJECTION, SOLUTION INTRAVENOUS; SUBCUTANEOUS at 11:30

## 2018-01-01 RX ADMIN — INSULIN LISPRO 2 UNITS: 100 INJECTION, SOLUTION INTRAVENOUS; SUBCUTANEOUS at 06:55

## 2018-01-01 RX ADMIN — IPRATROPIUM BROMIDE AND ALBUTEROL SULFATE 3 ML: .5; 3 SOLUTION RESPIRATORY (INHALATION) at 22:37

## 2018-01-01 RX ADMIN — Medication 10 ML: at 21:58

## 2018-01-01 RX ADMIN — Medication 5 ML: at 22:00

## 2018-01-01 RX ADMIN — INSULIN LISPRO 3 UNITS: 100 INJECTION, SOLUTION INTRAVENOUS; SUBCUTANEOUS at 11:56

## 2018-01-01 RX ADMIN — Medication 10 ML: at 13:54

## 2018-01-01 RX ADMIN — POTASSIUM CHLORIDE 10 MEQ: 200 INJECTION, SOLUTION INTRAVENOUS at 12:41

## 2018-01-01 RX ADMIN — HYDRALAZINE HYDROCHLORIDE 25 MG: 25 TABLET ORAL at 17:46

## 2018-01-01 RX ADMIN — LEVOTHYROXINE SODIUM 100 MCG: 100 TABLET ORAL at 07:14

## 2018-01-01 RX ADMIN — Medication 10 ML: at 06:28

## 2018-01-01 RX ADMIN — ASPIRIN 81 MG: 81 TABLET, COATED ORAL at 11:33

## 2018-02-14 NOTE — TELEPHONE ENCOUNTER
Marybelle Apley with [facility] called in to schedule the patient for a hospital follow up appointment with Dr Halina Stephens. She stated that the patient was seen for CHF. She stated that the patient was discharged on Feb 9th. Thanks!   Phone [phone]

## 2018-03-01 NOTE — PROGRESS NOTES
Johnny Camarillo     1935       Jodi Tsai MD, [facility]  Date of Visit-3/1/2018   PCP is Josephina Canavan, MD   [facility] [place]  [facility]  HPI:  Johnny Camarillo is a 81 y.o. female   Pt hospitalized at [facility] with CHF. Has prior normal EF but RV dysfunction due to lung disease. Records from [facility] reviewed during visit. Echo 2/7/18 EF 45-50% with mild LVH, dilated RV with moderate RV failure, PA pressure of 56. EKG at that time showed LBBB, creatine 1.64, pt was discharged on 2/9/18 with pulmonary edema mixed diastolic/systolic dysfunction and acute kidney injury. Responded to IV lasix and then put on bumex. It was felt that she had dehydration from diarrhea she had a foot ulcer with cellulitis and was to follow up with surgery. She had carotid dopplers with minimal disease. The pt was in the hospital from 2/6/18-2/9/18. She states that she went to see her endocrinologist and was feeling short of breath, she then went to have a chest xray for this. The pt was sent to the ED after getting her chest xray and was then admitted. She is now in a rehab center and they have been concerned because her oxygen saturations have been below 90. The pt states that she was taken care of by Dr. Nelson Chen and has a follow up appointment with him tomorrow. She reports that she fell this summer and got a cracked rib and she is unsure if this is the reason she is being followed by general surgery. The pt has noticed some swelling in the legs but this has gotten better since leaving the hospital.    The pt asked about when she needs to be wearing her oxygen and when she can stop wearing it. She also inquired about whether or not she can drive/should be driving. The pt reports that she is not seeing a pulmonologist regularly. Denies chest pain, syncope, has no tachycardia, palpitations or sense of arrhythmia.    + edema + shortness of breath   Assessment/Plan:     1.  Combined diastolic/systolic CHF, compensated on appropriate meds, continue with ACE and BB appears to be at a good volume status will see back in 3 months    2. COPD recommend that she continue oxygen she wants to take it off for meals, that is probably okay, I have suggested that she see a pulmonologist, she wants to hold off on that. Will leave that as a recommendation for her attending physician    3. Dilated RV and RV failure with moderate PA HTN  4. MORGAN-fu renal fx post diuretic  Future Appointments  Date Time Provider Alice Thornton   6/5/2018 11:40 AM Marquez Moreira  [address]         [facility] CHF Meds             digoxin (LANOXIN) 0.125 mg tablet  (Taking) Take 0.125 mg by mouth every other day. carvedilol (COREG) 6.25 mg tablet  (Taking) Take  by mouth two (2) times daily (with meals). bumetanide (BUMEX) 2 mg tablet  (Taking) Take 2 mg by mouth daily. lisinopril (PRINIVIL, ZESTRIL) 40 mg tablet  (Taking) TK 1 T PO QD    aspirin delayed-release 81 mg tablet  (Taking) Take  by mouth daily. Impression:   1. Cor pulmonale (HCC)    2. Essential hypertension    3. MORGAN (acute kidney injury) ([address]    4. Chronic obstructive pulmonary disease, unspecified COPD type ([address]    5. Pulmonary hypertension    6. Elevated brain natriuretic peptide (BNP) level    7. HAYS (dyspnea on exertion)       Cardiac History:   Cor pulmonale  June 2017-   ECHO 2017 RV enlargement lower RVEF with normal LVEF 55-60%  ABIs 2017 WN  NUKE 7-13-17 =Lexiscan pharmacologic stress test shows normal perfusion with an EF of 64 %. Admit   ---Echo 2/7/18 EF 45-50% with mild LVH, dilated RV with moderate RV failure, PA pressure of 56. creatine 1.64, pt was discharged on 2/9/18 with pulmonary edema mixed diastolic/systolic dysfunction and acute kidney injury. -dehydration from diarrhea she had a foot ulcer with cellulitis   --carotid dopplers with minimal disease. ROS-except as noted above. . A complete cardiac and respiratory are reviewed and negative except as above ; Resp-denies wheezing  or productive cough,. Const- No unusual weight loss or fever; Neuro-no recent seizure or CVA ; GI- No BRBPR, abdom pain, bloating ; - no  hematuria   see supplement sheet, initialed and to be scanned by staff  Past Medical History:   Diagnosis Date    Arthritis     Chronic obstructive pulmonary disease ([facility] 7/23/2017    Cor pulmonale ([facility] 8/29/2017    Diabetes ([facility]     Elevated brain natriuretic peptide (BNP) level 7/23/2017    Essential hypertension 7/23/2017    Shortness of breath     Thyroid disease       Social Hx= reports that she quit smoking about 18 years ago. She has never used smokeless tobacco. She reports that she does not drink alcohol or use illicit drugs. Exam and Labs:  /80 (BP 1 Location: Left arm, BP Patient Position: Sitting)  Pulse 88  Resp 16  Ht 5' (1.524 m)  Wt 134 lb 9.6 oz (61.1 kg)  SpO2 93%  BMI 26.29 kg/f7Ymbamxyrevogme:  NAD, comfortable  Head: NC,AT. Eyes: No scleral icterus. Neck:  Neck supple. No JVD present. Throat: moist mucous membranes. Chest: Effort normal & normal respiratory excursion . Neurological: alert, conversant and oriented . Skin: Skin is not cold. No obvious systemic rash noted. Not diaphoretic. No erythema. Psychiatric:  Grossly normal mood and affect. Behavior appears normal. Extremities:  no clubbing or cyanosis. Abdomen: non distended    Lungs:few crackles at the bases, decreased air movement throughout. No stridor. distress, wheezes or  Rales. Heart: normal rate, regular rhythm, normal S1, S2, no murmurs, rubs, clicks or gallops , PMI non displaced. Edema: Edema is 1+ at the ankles, dressing on the bottom of the right foot.     Wt Readings from Last 3 Encounters:   03/01/18 134 lb 9.6 oz (61.1 kg)   08/17/17 137 lb 12.8 oz (62.5 kg)   07/20/17 140 lb 3.2 oz (63.6 kg)      BP Readings from Last 3 Encounters:   03/01/18 160/80   08/17/17 146/72 07/20/17 130/78      Current Outpatient Prescriptions   Medication Sig    insulin regular (HUMULIN R REGULAR U-100 INSULN) 100 unit/mL injection by SubCUTAneous route.  digoxin (LANOXIN) 0.125 mg tablet Take 0.125 mg by mouth every other day.  carvedilol (COREG) 6.25 mg tablet Take  by mouth two (2) times daily (with meals).  bumetanide (BUMEX) 2 mg tablet Take 2 mg by mouth daily.  levothyroxine (SYNTHROID) 100 mcg tablet TK 1 T PO DAILY MONDAY THROUGH SATURDAY AND 2 TS PO ON SUNDAY    lisinopril (PRINIVIL, ZESTRIL) 40 mg tablet TK 1 T PO QD    aspirin delayed-release 81 mg tablet Take  by mouth daily.  furosemide (LASIX) 80 mg tablet Take 1 Tab by mouth daily. Tuesday,Thursday,Saturday and Sunday    furosemide (LASIX) 40 mg tablet Take 1 Tab by mouth every Monday, Wednesday, Friday.  FREESTYLE LITE STRIPS strip TEST BLOOD SUGAR BID    glipiZIDE (GLUCOTROL) 10 mg tablet TAKE 1 T PO BID    potassium chloride SR (KLOR-CON 10) 10 mEq tablet TK 1 T PO QD    metFORMIN (GLUCOPHAGE) 1,000 mg tablet Take 500 mg by mouth daily.  insulin glargine (LANTUS SOLOSTAR) 100 unit/mL (3 mL) inpn by SubCUTAneous route.  VIT C/E/ZN/COPPR/LUTEIN/ZEAXAN (PRESERVISION AREDS 2 PO) Take  by mouth. No current facility-administered medications for this visit. Impression see above.       Written by Yari Orozco, as dictated by Zoë Araujo MD.

## 2018-03-01 NOTE — MR AVS SNAPSHOT
[facility] [address] [place] [address] 
[phone] Patient: Lisa Jesus MRN: [id number] :1935 Visit Information Date & Time Provider Department Dept. Phone Encounter #  
 3/1/2018 10:00 AM Rachelle Call MD [facility] [phone] [id number] Upcoming Health Maintenance Date Due  
 FOOT EXAM Q1 1945 MICROALBUMIN Q1 1945 EYE EXAM RETINAL OR DILATED Q1 1945 DTaP/Tdap/Td series (1 - Tdap) 1957 ZOSTER VACCINE AGE 60> 10/20/1996 GLAUCOMA SCREENING Q2Y 2001 OSTEOPOROSIS SCREENING (DEXA) 2001 Pneumococcal 65+ Low/Medium Risk (1 of 2 - PCV13) 2001 MEDICARE YEARLY EXAM 2001 Influenza Age 5 to Adult 2017 HEMOGLOBIN A1C Q6M 2018 LIPID PANEL Q1 10/5/2018 Allergies as of 3/1/2018  Review Complete On: 3/1/2018 By: Rahcelle Call MD  
  
 Severity Noted Reaction Type Reactions Other Medication  2017    Rash Auromycin Chlorocetin Current Immunizations  Never Reviewed No immunizations on file. Not reviewed this visit You Were Diagnosed With   
  
 Codes Comments Cor pulmonale (HCC)    -  Primary ICD-10-CM: I27.81 ICD-9-CM: 416.9 Essential hypertension     ICD-10-CM: I10 
ICD-9-CM: 401.9 Elevated brain natriuretic peptide (BNP) level     ICD-10-CM: R79.89 ICD-9-CM: 790.99   
 HAYS (dyspnea on exertion)     ICD-10-CM: R06.09 
ICD-9-CM: 786.09 Vitals BP Pulse Resp Height(growth percentile) Weight(growth percentile) SpO2  
 160/80 (BP 1 Location: Left arm, BP Patient Position: Sitting) 88 16 5' (1.524 m) 134 lb 9.6 oz (61.1 kg) 93% BMI OB Status Smoking Status 26.29 kg/m2 Postmenopausal Former Smoker BMI and BSA Data Body Mass Index Body Surface Area  
 26.29 kg/m 2 1.61 m 2 Preferred Pharmacy Pharmacy Name Phone [address] [place] [address] [phone] Your Updated Medication List  
  
   
This list is accurate as of 3/1/18 11:53 AM.  Always use your most recent med list.  
  
  
  
  
 aspirin delayed-release 81 mg tablet Take  by mouth daily. bumetanide 2 mg tablet Commonly known as:  Stephanie Dryer Take 2 mg by mouth daily. carvedilol 6.25 mg tablet Commonly known as:  Ruthell Fellers Take  by mouth two (2) times daily (with meals). digoxin 0.125 mg tablet Commonly known as:  LANOXIN Take 0.125 mg by mouth every other day. HumuLIN R Regular U-100 Insuln 100 unit/mL injection Generic drug:  insulin regular  
by SubCUTAneous route. levothyroxine 100 mcg tablet Commonly known as:  SYNTHROID TK 1 T PO DAILY MONDAY THROUGH SATURDAY AND 2 TS PO ON SUNDAY  
  
 lisinopril 40 mg tablet Commonly known as:  Loralie Casillas TK 1 T PO QD Patient Instructions You will need to follow up in clinic with Dr. José Antonio Rosales in 3 months. Introducing [facility]! Amauri Marks introduces [employer] patient portal. Now you can access parts of your medical record, email your doctor's office, and request medication refills online. 1. In your internet browser, go to [email or web address] 2. Click on the First Time User? Click Here link in the Sign In box. You will see the New Member Sign Up page. 3. Enter your [employer] Access Code exactly as it appears below. You will not need to use this code after youve completed the sign-up process. If you do not sign up before the expiration date, you must request a new code. · [employer] Access Code: [email or web address] Expires: 5/29/2018  5:19 PM 
 
4. Enter the last four digits of your Social Security Number (xxxx) and Date of Birth (mm/dd/yyyy) as indicated and click Submit. You will be taken to the next sign-up page. 5. Create a [employer] ID. This will be your [employer] login ID and cannot be changed, so think of one that is secure and easy to remember. 6. Create a [employer] password. You can change your password at any time. 7. Enter your Password Reset Question and Answer. This can be used at a later time if you forget your password. 8. Enter your e-mail address. You will receive e-mail notification when new information is available in [address] 9. Click Sign Up. You can now view and download portions of your medical record. 10. Click the Download Summary menu link to download a portable copy of your medical information. If you have questions, please visit the Frequently Asked Questions section of the [employer] website. Remember, [employer] is NOT to be used for urgent needs. For medical emergencies, dial 911. Now available from your iPhone and Android! Please provide this summary of care documentation to your next provider. Your primary care clinician is listed as Ck Gallardo. If you have any questions after today's visit, please call [phone].

## 2018-03-25 PROBLEM — N17.9 AKI (ACUTE KIDNEY INJURY) (HCC): Status: ACTIVE | Noted: 2018-01-01

## 2018-05-17 PROBLEM — I50.9 CHF EXACERBATION (HCC): Status: ACTIVE | Noted: 2018-01-01

## 2018-05-17 NOTE — ED NOTES
Dr. Maria L Casillas knows that pt has been in mid 80's on 4l. She did not have nasal cannula in nose correctly. I corrected it and now sats in low 90's on 2l.

## 2018-05-17 NOTE — ED PROVIDER NOTES
HPI Comments: .81 y.o. female with past medical history significant for SOB, DM, thyroid disease, arthritis, COPD, essential HTN, elevated BNP, cor pulmonale and MORGAN who presents from home with chief complaint of SOB. Per pt, she has hx of CHF and believes she is experiencing an exacerbation since last night. The pt reports that she usually is on 2L O2 nasal canula all the time. Pt notes that she had her O2 canula replaced yesterday, however has felt as if she is not receiving any oxygen. She adds that she is unsure if the canula they left was empty or not. In addition to her increased SOB, the pt reports that she has been experiencing bilateral foot swelling for some time now which she sees Endocrinology for. The pt adds that she has noticed a 15-20 pound weight gain over the past few months due to her swelling. No increase in her foot swelling has been noted since her SOB exacerbation. Per pt, she is currently seeing Dr. Carrie Soriano for Cardiology who she has followed up within the recent months. She denies fever, chills, N/V/D, CP, back pain, abd pain, dizziness, headache and urinary symptoms. There are no other acute medical concerns at this time. Social hx: Former smoker, No ETOH consumption     PCP: Javier Irving MD    Note written by Sushant Flores, as dictated by Dalila Selby MD 3:16 PM            The history is provided by the patient. No  was used.         Past Medical History:   Diagnosis Date    MORGAN (acute kidney injury) ([facility] [address] 3/25/2018    Arthritis     Chronic obstructive pulmonary disease ([address] 7/23/2017    Cor pulmonale ([address] 8/29/2017    Diabetes ([address]     Elevated brain natriuretic peptide (BNP) level 7/23/2017    Essential hypertension 7/23/2017    Shortness of breath     Thyroid disease        Past Surgical History:   Procedure Laterality Date    HX CATARACT REMOVAL Bilateral          Family History:   Problem Relation Age of Onset    Diabetes Brother Social History     Social History    Marital status: SINGLE     Spouse name: N/A    Number of children: N/A    Years of education: N/A     Occupational History    Not on file. Social History Main Topics    Smoking status: Former Smoker     Quit date: 2000    Smokeless tobacco: Never Used    Alcohol use No    Drug use: No    Sexual activity: Not on file     Other Topics Concern    Not on file     Social History Narrative         ALLERGIES: Other medication    Review of Systems   Constitutional: Negative for activity change, chills and fever. HENT: Negative for nosebleeds, sore throat, trouble swallowing and voice change. Eyes: Negative for visual disturbance. Respiratory: Positive for shortness of breath. Negative for cough. Cardiovascular: Positive for leg swelling (chronic to bilateral feet and unchanged ). Negative for chest pain and palpitations. Gastrointestinal: Negative for abdominal pain, constipation, diarrhea and nausea. Genitourinary: Negative for difficulty urinating, dysuria, hematuria and urgency. Musculoskeletal: Negative for back pain, neck pain and neck stiffness. Skin: Negative for color change. Allergic/Immunologic: Negative for immunocompromised state. Neurological: Negative for dizziness, seizures, syncope, weakness, light-headedness, numbness and headaches. Psychiatric/Behavioral: Negative for behavioral problems, confusion, hallucinations, self-injury and suicidal ideas. All other systems reviewed and are negative. Vitals:    05/17/18 1503   BP: 154/71   Pulse: 93   Resp: 18   Temp: 98.7 °F (37.1 °C)   SpO2: (!) 58%   Weight: 64 kg (141 lb 1.6 oz)   Height: 5' (1.524 m)            Physical Exam   Constitutional: She is oriented to person, place, and time. She appears well-developed and well-nourished. No distress. Elderly and chronically ill appearing female    HENT:   Head: Normocephalic and atraumatic.    Eyes: Pupils are equal, round, and reactive to light. Neck: Normal range of motion. Neck supple. Cardiovascular: Normal rate, regular rhythm and normal heart sounds. Exam reveals no gallop and no friction rub. No murmur heard. Pulmonary/Chest: Effort normal. No respiratory distress. She has no wheezes. Saturations in the mid 80's while on 2L. Decreased breath sounds to the bilateral bases    Abdominal: Soft. Bowel sounds are normal. She exhibits no distension. There is no tenderness. There is no rebound and no guarding. Musculoskeletal: Normal range of motion. She exhibits edema (1+ pitting to bilateral LE ). She exhibits no tenderness. Neurological: She is alert and oriented to person, place, and time. Skin: Skin is warm. No rash noted. She is not diaphoretic. Old region of ecchymosis over inferior margin of left orbit. Psychiatric: She has a normal mood and affect. Her behavior is normal. Judgment and thought content normal.   Nursing note and vitals reviewed. Note written by Sushant Gilliam, as dictated by Joann Cadet MD 3:16 PM    [facility]      ED Course     This is an 81-year-old female with past medical history, review of systems, physical exam as above, presenting with complaints of hypoxia. Patient states she is at her baseline health, however was seeing her endocrinologist today, and sent to the emergency department for concerns of worsening pedal edema. Upon arrival she is satting in the upper 50s, maintaining normally, with an apparently empty oxygen bottle. Patient states that her oxygen bottle was changed out last night per her home oxygen service. She endorses mildly worsening pedal edema, states her respiratory effort is at baseline. Physical exam remarkable for elderly, chronically ill-appearing female, satting in the mid [de-identified] on 2 L by nasal cannula, without increased respiratory rate, accessory muscle use, or changes in her mentation.  She has 1+ pitting edema in the bilateral lower extremities, decreased breath sounds in the bilateral bases, no JVD, regular rate and rhythm without murmurs gallops or rubs. Her review indicates right heart failure, secondary to chronic lung disease. Plan to increase supplemental oxygen, obtain CMP, CBC, ENT, cardiac enzymes, EKG, chest x-ray. We will enlist case management for assistance with her home oxygen. We will make a disposition based on the patient's diagnostics and response to therapy. Procedures    CONSULT NOTE:  5:06 Jose G Caldera MD spoke with Dr. Olaf David, Consult for Cardiology. Discussed available diagnostic tests and clinical findings. Dr. Olaf David agrees with patient discharge at this time. PROGRESS NOTE:  5:14 PM    Upon going in to discharge the pt, she was noted to have O2 saturations in the mid 80's while on 2L. Will increase her oxygen and consult Dr. Olaf David. Pt will receive Lasix. CONSULT NOTE:  5:41 Jose G Caldera MD spoke with Dr. Olaf David, Consult for Cardiology. Discussed available diagnostic tests and clinical findings. Dr. Olaf David states that the pt will be admitted by hospitalist services and that he will see the pt in the morning. CONSULT NOTE:  5:52 PM Alfredo Guerra MD spoke with Dr. Jeni Rees for Hospitalist.  Discussed available diagnostic tests and clinical findings. Dr. Marissa Ann will see and admit the pt.

## 2018-05-17 NOTE — PROGRESS NOTES
Senior Services Consult - oxygen problem    Date of previous inpatient admission/ ED visit? N/A    What brought the patient back to ED? N/A    Did patient decline recommended services during last admission/ ED visit (if yes, what)? N/A    Has patient seen a provider since their last inpatient admission/ED visit (if yes, when) N/A    CM Interventions:    Care Management Interventions  PCP Verified by CM: Yes (Dr. Alex Dudley)  Last Visit to PCP: 02/05/18  Mode of Transport at Discharge: BLS ([facility] will need to be set up due to oxygen needs)  Transition of Care Consult (CM Consult): Discharge Planning (Senior Sevices Consult )  MyChart Signup: No  Discharge Durable Medical Equipment:  (Rollator, portable oxygen, stationary oxygen)  Health Maintenance Reviewed: Yes  Physical Therapy Consult: No  Occupational Therapy Consult: No  Speech Therapy Consult: No  Current Support Network: Lives Alone (Lives alone, sister does grocery shopping, she uses transportation services.)  Plan discussed with Pt/Family/Caregiver: Yes (Met with patient and sister in ED/15. Patient states that portable oxygen tank was empty. She does have other tanks in the home.  )  Freedom of Choice Offered: Yes    Received senior services consult, problems with oxygen in the home. Per patient she had an appointment with endocrinology, transportation picked her up. She became hypoxic, tank she was using was empty. Sister at bedside is going to call [facility], she is unsure if she grabbed wrong tank. Verified face sheet and demographics. Sister lives very close. The concentrator is in the home, she would need to go by ambulance. Discussed home health as an option, they would consider and call back.       Makeda Browne, LAY

## 2018-05-17 NOTE — H&P
[address]  HISTORY AND PHYSICAL      Brian Black  MR#: [id number]  : 1935  ACCOUNT #: [de-identified]   ADMIT DATE: 2018    CHIEF COMPLAINT:  Increasing shortness of breath and leg swelling. HISTORY OF PRESENT ILLNESS:  This is an 16-year-old white female with history of chronic respiratory failure due to COPD on home O2, cor pulmonale, right-sided heart failure, diabetes, CKD and hypothyroidism. Patient feels increasing shortness of breath last several days and also noticed increasing both leg swelling. She noticed 15-20 pounds weight gaining over the last several months and today patient feels extremely short of breath, not able to ambulate due to this, so he came to the ED. Today, the patient noticed more hypoxia in the ED, required increased O2 requirement. REVIEW OF SYSTEMS:  GENERAL:  Denies any fevers. Sleeping okay. HEENT:  No headache, no vision changes, no nose discharge. No hearing changes. RESPIRATORY:  Per H and P. No cough, no sputum. CARDIOVASCULAR:  Denied chest pain, no palpitation. MUSCULOSKELETAL:  No joint swelling, no muscle pain. The patient had a fall 1-2 weeks  ago with some bruise on top of her face, now better. SKIN:  No rash, no itching. GASTROINTESTINAL:  No nausea, vomiting or diarrhea. GENITOURINARY:  No dysuria, no hematuria. HEMATOLOGIC/ONCOLOGIC:  No gum bleeding, no petechia. NEUROLOGIC:  No sensation changes. No focal weakness. ENDOCRINE:  No polydipsia. PSYCHIATRIC:  Denied depression. PAST MEDICAL HISTORY:  COPD, chronic respiratory failure, right-sided heart failure, CKD stage III, diabetes. MEDICATIONS:  At home, patient is taking aspirin 81 mg daily, Bumex 2 mg daily, carvedilol 6.25 mg twice a day, digoxin 0.125 mg every other day, Lantus 10 units, Synthroid 100 mg Monday through Saturday and 200 mg on , lisinopril 5 mg daily. ALLERGIES:  PATIENT HAS NO DRUG ALLERGY.      PATIENT IS FULL CODE AT THIS MOMENT. SOCIAL HISTORY:  The patient lives alone, ambulating with a rollator and a former smoker, quitting 18 years ago. No alcohol abuse. FAMILY HISTORY:  Brother has diabetes. LABORATORY DATA:  On admission, WBC 8.6, H and H 12.7/40, platelet 264. Sodium 135, potassium 4.6, BUN 32, creatinine 1.7. BNP 8900. Chest x-ray noticed trace perfusion,  mild diffuse interstitial opacity suggestive of pulmonary edema. PHYSICAL EXAMINATION:    GENERAL:  The patient is awake, cooperative, in no acute distress. VITAL SIGNS:  Temperature 98.7, heart rate 88, sats 91% on 2 liters, blood pressure 154/71. HEENT:  Noticed the previous bruise around the left cheek and no other trauma noticed, normal nose and ear. No erythema in the throat. Lip looks like dry. NECK:  Supple, no JVD, no carotid bruits, no goiter. No lymph nodes palpable. CHEST:  Bilateral crackles noticed. Air entry so far okay. No wheezing. CARDIOVASCULAR:  Regular rate. No murmur. No tenderness on palpation. ABDOMEN:  Soft, nontender. Bowel sounds positive. No hepatomegaly and no bruits noticed. EXTREMITIES:  No joint swelling. Bilateral lower extremity 1-2+ pitting edema noticed both feet. Also, some edema noticed. Pulses 2+ bilateral.    SKIN:  Normal.  No rash. NEUROLOGIC:  Strength bilaterally equal.  No deficit. No sensation changes. Cranial nerves intact. ASSESSMENT AND PLAN:  1. Acute on chronic hypoxic respiratory failure. Patient had a blood gas noticed, pH is 7.39, pCO2 of 44, pO2 of 59 on 2 liters of nasal cannula, probably due to congestive heart failure exacerbation on top of her chronic obstructive pulmonary disease. At this moment, will  focus on CHF treatment. 2.  Acute on chronic biventricular congestive heart failure exacerbation, likely diastolic and  patient is gaining weight with leg edema. Chest x-ray also noticed interstitial edema and with high BNP clinical support of CHF exacerbation. The patient received 80 mg of Lasix in the hospital.  We will continue 80 mg IV q. 12 hours. We will repeat an echo and will follow weight and urine output and we will adjust the diuretics based on his clinical response. 3.  Possible chronic kidney disease stage IV, not sure the creatinine 1.7 is her baseline. We do not have baseline for now. We will follow her creatinine when on diuretics. May get  medical records from his PCP to find out his baseline creatinine and BUN. 4.  Diabetes, uncontrolled. We will continue Lantus and sliding scale. 5.  Hypertension. Continue carvedilol. We will temporarily hold lisinopril due to the level of the renal concern. 6.  Hypothyroidism. We will continue Synthroid. 7.  Chronic obstructive pulmonary disease. We will continue neb treatment. At this moment, there is no exacerbation.       MD ZEB Orozco/BASILIO  D: 05/17/2018 18:16     T: 05/17/2018 19:09  JOB #: [id number]

## 2018-05-17 NOTE — ED TRIAGE NOTES
Pt referred by PCP to ER for CHF exacerbation. Pt states she is out of O2, sat is 58% in triage on room air. Wears 2L NC normally.  +leg edema  82% on 2L   93% on 3L

## 2018-05-17 NOTE — IP AVS SNAPSHOT
[address] 
[phone] Patient: Tracy Deshpande MRN: [id number] :1935 A check sary indicates which time of day the medication should be taken. My Medications START taking these medications Instructions Each Dose to Equal  
 Morning Noon Evening Bedtime  
 hydrALAZINE 25 mg tablet Commonly known as:  APRESOLINE Your last dose was: Your next dose is: Take 1 Tab by mouth three (3) times daily. 25 mg CONTINUE taking these medications Instructions Each Dose to Equal  
 Morning Noon Evening Bedtime  
 aspirin delayed-release 81 mg tablet Your last dose was: Your next dose is: Take  by mouth daily. bumetanide 2 mg tablet Commonly known as:  Enoc Mendez Your last dose was: Your next dose is: Take 2 mg by mouth daily. 2 mg  
    
   
   
   
  
 carvedilol 6.25 mg tablet Commonly known as:  Kaylie Moreno Your last dose was: Your next dose is: Take  by mouth two (2) times daily (with meals). LANTUS U-100 INSULIN 100 unit/mL injection Generic drug:  insulin glargine Your last dose was: Your next dose is:    
   
   
 10 Units by SubCUTAneous route two (2) times a day. 10 Units * levothyroxine 100 mcg tablet Commonly known as:  SYNTHROID Your last dose was: Your next dose is: Take 100 mcg by mouth six (6) days a week. Monday through Saturday 100 mcg * levothyroxine 100 mcg tablet Commonly known as:  SYNTHROID Your last dose was: Your next dose is: Take 200 mcg by mouth every . 200 mcg PRESERVISION AREDS PO Your last dose was: Your next dose is: Take 1 Tab by mouth daily. 1 Tab * Notice: This list has 2 medication(s) that are the same as other medications prescribed for you. Read the directions carefully, and ask your doctor or other care provider to review them with you. STOP taking these medications   
 digoxin 0.125 mg tablet Commonly known as:  LANOXIN  
   
  
 lisinopril 5 mg tablet Commonly known as:  Rosalia Thomson Where to Get Your Medications Information on where to get these meds will be given to you by the nurse or doctor. ! Ask your nurse or doctor about these medications  
  hydrALAZINE 25 mg tablet

## 2018-05-17 NOTE — ED NOTES
3:03 PM  I have evaluated the patient as the Provider in Triage. I have reviewed Her vital signs and the triage nurse assessment. I have talked with the patient and any available family and advised that I am the provider in triage and have ordered the appropriate study to initiate their work up based on the clinical presentation during my assessment. I have advised that the patient will be accommodated in the Main ED as soon as possible. I have also requested to contact the triage nurse or myself immediately if the patient experiences any changes in their condition during this brief waiting period.   Blaine Chung NP    Pt states that she was sent by her doctor because of episodic SOB, bilateral leg swelling and concern for CHF flare

## 2018-05-17 NOTE — IP AVS SNAPSHOT
[address] [facility] Irene [address] 13 
[phone] Patient: Yasmin Singh MRN: [id number] :1935 About your hospitalization You were admitted on:  May 17, 2018 You last received care in the:  [address] MED SURG You were discharged on:  May 22, 2018 Why you were hospitalized Your primary diagnosis was:  Chf Exacerbation (Hcc) Follow-up Information Follow up With Details Comments Contact Info Cirilo Sanz MD In 2 weeks Discharge follow up  Nadeem Romero [address] 
[phone] Man Bower MD On 2018 at 9:10 AM on Monday, [address] [facility] 
[phone] [address] [place] [address]   Esau Pandey [address] [address] 
[phone] Your Scheduled Appointments 2018  9:10 AM EDT  
ESTABLISHED PATIENT with Man Bower MD  
[facility] ([facility]) [address]  [address] [facility]  
[phone] Discharge Orders None A check sary indicates which time of day the medication should be taken. My Medications START taking these medications Instructions Each Dose to Equal  
 Morning Noon Evening Bedtime  
 hydrALAZINE 25 mg tablet Commonly known as:  APRESOLINE Your last dose was: Your next dose is: Take 1 Tab by mouth three (3) times daily. 25 mg CONTINUE taking these medications Instructions Each Dose to Equal  
 Morning Noon Evening Bedtime  
 aspirin delayed-release 81 mg tablet Your last dose was: Your next dose is: Take  by mouth daily. bumetanide 2 mg tablet Commonly known as:  Addy Daubs Your last dose was: Your next dose is: Take 2 mg by mouth daily. 2 mg carvedilol 6.25 mg tablet Commonly known as:  Seth Real Your last dose was: Your next dose is: Take  by mouth two (2) times daily (with meals). LANTUS U-100 INSULIN 100 unit/mL injection Generic drug:  insulin glargine Your last dose was: Your next dose is:    
   
   
 10 Units by SubCUTAneous route two (2) times a day. 10 Units * levothyroxine 100 mcg tablet Commonly known as:  SYNTHROID Your last dose was: Your next dose is: Take 100 mcg by mouth six (6) days a week. Monday through Saturday 100 mcg * levothyroxine 100 mcg tablet Commonly known as:  SYNTHROID Your last dose was: Your next dose is: Take 200 mcg by mouth every Sunday. 200 mcg PRESERVISION AREDS PO Your last dose was: Your next dose is: Take 1 Tab by mouth daily. 1 Tab * Notice: This list has 2 medication(s) that are the same as other medications prescribed for you. Read the directions carefully, and ask your doctor or other care provider to review them with you. STOP taking these medications   
 digoxin 0.125 mg tablet Commonly known as:  LANOXIN  
   
  
 lisinopril 5 mg tablet Commonly known as:  Sourav Cameron Where to Get Your Medications Information on where to get these meds will be given to you by the nurse or doctor. ! Ask your nurse or doctor about these medications  
  hydrALAZINE 25 mg tablet Discharge Instructions Discharging provider: Erica Jessica MD 
 
Primary care provider: Leonie Lagos MD 
 
[address]: 
 
36-year-old white female with history of chronic respiratory failure due to COPD on home O2, cor pulmonale, right-sided heart failure, diabetes, CKD and hypothyroidism.  Patient feels increasing shortness of breath last several days and also noticed increasing both leg swelling.  She noticed 15-20 pounds weight gaining over the last several months and today patient feels extremely short of breath, not able to ambulate due to this, so he came to the ED. Tori Bernice, the patient noticed more hypoxia in the ED, required increased O2 requirement. 1.  Acute on chronic hypoxic respiratory failure.   
- probably due to congestive heart failure exacerbation on top of her chronic obstructive pulmonary disease. In the setting of volume overload cont supportive care with o2/ nebs  
  
2.  Acute on chronic diastolic congestive heart failure exacerbation NYHA 2-3  -  ECHO - Systolic function was normal. Ejection fraction was estimated in the range of 55 % to 60 %. There were no regional wall motion abnormalities. - Systolic CHF have resolved - Chest x-ray also noticed interstitial edema and with high BNP  
- c/w BB and Hydralazine - c/w home Bumex 
   
3.  Possible chronic kidney disease stage IV,  
- Cr 1.7   - We will follow her creatinine when on diuretics.   
  
4.  Diabetes, uncontrolled.  We will continue Lantus and sliding scale. A1C 9.5 She is taking lantus 10 units BID  
  
5.  Hypertension.  Continue carvedilol, added hydralazine 
  
6.  Hypothyroidism.  We will continue Synthroid. 
  
7.  Chronic obstructive pulmonary disease.  We will continue neb treatment.  At this time, there is no exacerbation. 
  
8. Hypokalemia- resolved FOLLOW-UP CARE RECOMMENDATIONS: 
 
APPOINTMENTS: 
Follow-up Information Follow up With Details Comments Contact Info Jono Montero MD In 2 weeks Discharge follow up  Nadeem Romero 32 [address] 
[phone] Edgar Jordan MD On 5/24/2018 at 1:40 pm on 5/24/18 [address] [facility] 57 
[phone] [address] [facility]   Esau Pandey [address] [address] 
[phone] It is very important that you keep follow-up appointment(s). Bring discharge papers, medication list (and/or medication bottles) to follow-up appointments for review by outpatient provider(s). FOLLOW-UP TESTS RECOMMENDED:  
· Daily wts, Fluid restrictions to 1200cc · Continue Oxygen 2L, on chronic O2 ONGOING TREATMENT PLAN: see above PENDING TEST RESULTS: 
At the time of discharge the following test results are still pending: none. Please review these results as they become available. Specific symptoms to watch for: chest pain, shortness of breath, fever, chills, nausea, vomiting, diarrhea, change in mentation, falling, weakness, bleeding. DIET:  Cardiac Diet and Diabetic Diet ACTIVITY:  Activity as tolerated WOUND CARE: NONE 
 
EQUIPMENT needed:  NONE INCIDENTAL FINDINGS:  NONE 
 
GOALS OF CARE: 
X  Eventual return to home/independent/assisted living Long term SNF Hospice No rehospitalization Patient condition at discharge:  
Functional status Poor X  Deconditioned Independent Cognition X  Lucid Forgetful (some sensescence) Dementia Catheters/lines (plus indication) Leigh PICC   
  PEG Code status Full code X  DNR Aurelio Chiu . . . . . . . . . . . . . . . . . . . . . . . . . . . . . . . . . . . . . . . . . . . . . . . . . . . . . . . . . . . . . . . . . . . . . . Aurelio Chiu CHRONIC MEDICAL CONDITIONS: 
Problem List as of 5/22/2018  Date Reviewed: 3/25/2018 Codes Class Noted - Resolved * (Principal)CHF exacerbation ([facility] ICD-10-CM: I50.9 ICD-9-CM: 428.0  5/17/2018 - Present MORGAN (acute kidney injury) ([facility] ICD-10-CM: N17.9 ICD-9-CM: 584.9  3/25/2018 - Present Cor pulmonale (HCC) ICD-10-CM: T48.86 ICD-9-CM: 416.9  8/29/2017 - Present Diabetes ([facility] ICD-10-CM: E11.9 ICD-9-CM: 250.00  Unknown - Present  HAYS (dyspnea on exertion) ICD-10-CM: R06.09 
 ICD-9-CM: 786.09  7/23/2017 - Present Chronic obstructive pulmonary disease (HCC) ICD-10-CM: J44.9 ICD-9-CM: 672  7/23/2017 - Present Essential hypertension ICD-10-CM: I10 
ICD-9-CM: 401.9  7/23/2017 - Present Elevated brain natriuretic peptide (BNP) level ICD-10-CM: R79.89 ICD-9-CM: 790.99  7/23/2017 - Present RESOLVED: Thyroid disease ICD-10-CM: E07.9 ICD-9-CM: 246. 9  Unknown - 3/1/2018 [employer] Announcement We are excited to announce that we are making your provider's discharge notes available to you in [employer]. You will see these notes when they are completed and signed by the physician that discharged you from your recent hospital stay. If you have any questions or concerns about any information you see in [employer], please call the Health Information Department where you were seen or reach out to your Primary Care Provider for more information about your plan of care. Introducing [facility]! Jude Carmona introduces [employer] patient portal. Now you can access parts of your medical record, email your doctor's office, and request medication refills online. 1. In your internet browser, go to [email or web address] 2. Click on the First Time User? Click Here link in the Sign In box. You will see the New Member Sign Up page. 3. Enter your [employer] Access Code exactly as it appears below. You will not need to use this code after youve completed the sign-up process. If you do not sign up before the expiration date, you must request a new code. · [employer] Access Code: [email or web address] Expires: 5/29/2018  6:19 PM 
 
4. Enter the last four digits of your Social Security Number (xxxx) and Date of Birth (mm/dd/yyyy) as indicated and click Submit. You will be taken to the next sign-up page. 5. Create a [employer] ID. This will be your [employer] login ID and cannot be changed, so think of one that is secure and easy to remember. 6. Create a [employer] password. You can change your password at any time. 7. Enter your Password Reset Question and Answer. This can be used at a later time if you forget your password. 8. Enter your e-mail address. You will receive e-mail notification when new information is available in [address] 9. Click Sign Up. You can now view and download portions of your medical record. 10. Click the Download Summary menu link to download a portable copy of your medical information. If you have questions, please visit the Frequently Asked Questions section of the [employer] website. Remember, [employer] is NOT to be used for urgent needs. For medical emergencies, dial 911. Now available from your iPhone and Android! Introducing [employer]a As a [facility] patient, I wanted to make you aware of our electronic visit tool called Americ[employer] Oshea. [employer] allows you to connect within minutes with a medical provider 24 hours a day, seven days a week via a mobile device or tablet or logging into a secure website from your computer. You can access Americo Oshea from anywhere in the [place]. A virtual visit might be right for you when you have a simple condition and feel like you just dont want to get out of bed, or cant get away from work for an appointment, when your regular [facility] provider is not available (evenings, weekends or holidays), or when youre out of town and need minor care. Electronic visits cost only $49 and if the [employer] provider determines a prescription is needed to treat your condition, one can be electronically transmitted to a nearby pharmacy*. Please take a moment to enroll today if you have not already done so. The enrollment process is free and takes just a few minutes. To enroll, please download the [employer] teddy to your tablet or phone, or visit [email or web address] to enroll on your computer. And, as an [address] [place] [address] patient with a State account, the results of your visits will be scanned into your electronic medical record and your primary care provider will be able to view the scanned results. We urge you to continue to see your regular [facility] provider for your ongoing medical care. And while your primary care provider may not be the one available when you seek a [employer] virtual visit, the peace of mind you get from getting a real diagnosis real time can be priceless. For more information on Americo [employer], view our Frequently Asked Questions (FAQs) at [email or web address]. Sincerely, 
 
Sony Holman MD 
Chief Medical Officer Ashvin Mo *:  certain medications cannot be prescribed via [employer] Providers Seen During Your Hospitalization Provider Specialty Primary office phone Mitchell Khan MD Emergency Medicine [phone] Addi Santos MD Internal Medicine [phone] Marvin Martínez MD Internal Medicine [phone] Alison Felder MD Internal Medicine [phone] Your Primary Care Physician (PCP) Primary Care Physician Office Phone Office Fax [address] [phone] You are allergic to the following Allergen Reactions Ace Inhibitors Other (comments) Elevated creatinine Other Medication Rash Auromycin Chlorocetin Recent Documentation Height Weight BMI OB Status Smoking Status 1.524 m 64 kg 27.56 kg/m2 Postmenopausal Former Smoker Emergency Contacts Name Discharge Info Relation Home Work [facility] CAREGIVER [3] Sister [23] [phone] Patient Belongings  The following personal items are in your possession at time of discharge: 
     Visual Aid: Glasses      Home Medications: Sent home   Jewelry: Bracelet, Watch, Ring (bracelets with pt.  rings & watch in safe) Other Valuables: None Discharge Instructions Attachments/References HEART FAILURE: AVOIDING TRIGGERS (ENGLISH) Patient Handouts Avoiding Triggers With Heart Failure: Care Instructions Your Care Instructions Triggers are anything that make your heart failure flare up. A flare-up is also called \"sudden heart failure\" or \"acute heart failure. \" When you have a flare-up, fluid builds up in your lungs, and you have problems breathing. You might need to go to the hospital. By watching for changes in your condition and avoiding triggers, you can prevent heart failure flare-ups. Follow-up care is a key part of your treatment and safety. Be sure to make and go to all appointments, and call your doctor if you are having problems. It's also a good idea to know your test results and keep a list of the medicines you take. How can you care for yourself at home? Watch for changes in your weight and condition · Weigh yourself without clothing at the same time each day. Record your weight. Call your doctor if you have sudden weight gain, such as more than 2 to 3 pounds in a day or 5 pounds in a week. (Your doctor may suggest a different range of weight gain.) A sudden weight gain may mean that your heart failure is getting worse. · Keep a daily record of your symptoms. Write down any changes in how you feel, such as new shortness of breath, cough, or problems eating. Also record if your ankles are more swollen than usual and if you feel more tired than usual. Note anything that you ate or did that could have triggered these changes. Limit sodium Sodium causes your body to hold on to extra water. This may cause your heart failure symptoms to get worse. People get most of their sodium from processed foods. Fast food and restaurant meals also tend to be very high in sodium.  
· Your doctor may suggest that you limit sodium to 2,000 milligrams (mg) a day or less. That is less than 1 teaspoon of salt a day, including all the salt you eat in cooking or in packaged foods. · Read food labels on cans and food packages. They tell you how much sodium you get in one serving. Check the serving size. If you eat more than one serving, you are getting more sodium. · Be aware that sodium can come in forms other than salt, including monosodium glutamate (MSG), sodium citrate, and sodium bicarbonate (baking soda). MSG is often added to Asian food. You can sometimes ask for food without MSG or salt. · Slowly reducing salt will help you adjust to the taste. Take the salt shaker off the table. · Flavor your food with garlic, lemon juice, onion, vinegar, herbs, and spices instead of salt. Do not use soy sauce, steak sauce, onion salt, garlic salt, mustard, or ketchup on your food, unless it is labeled \"low-sodium\" or \"low-salt. \" 
· Make your own salad dressings, sauces, and ketchup without adding salt. · Use fresh or frozen ingredients, instead of canned ones, whenever you can. Choose low-sodium canned goods. · Eat less processed food and food from restaurants, including fast food. Exercise as directed Moderate, regular exercise is very good for your heart. It improves your blood flow and helps control your weight. But too much exercise can stress your heart and cause a heart failure flare-up. · Check with your doctor before you start an exercise program. 
· Walking is an easy way to get exercise. Start out slowly. Gradually increase the length and pace of your walk. Swimming, riding a bike, and using a treadmill are also good forms of exercise. · When you exercise, watch for signs that your heart is working too hard. You are pushing yourself too hard if you cannot talk while you are exercising. If you become short of breath or dizzy or have chest pain, stop, sit down, and rest. 
· Do not exercise when you do not feel well. Take medicines correctly · Take your medicines exactly as prescribed. Call your doctor if you think you are having a problem with your medicine. · Make a list of all the medicines you take. Include those prescribed to you by other doctors and any over-the-counter medicines, vitamins, or supplements you take. Take this list with you when you go to any doctor. · Take your medicines at the same time every day. It may help you to post a list of all the medicines you take every day and what time of day you take them. · Make taking your medicine as simple as you can. Plan times to take your medicines when you are doing other things, such as eating a meal or getting ready for bed. This will make it easier to remember to take your medicines. · Get organized. Use helpful tools, such as daily or weekly pill containers. When should you call for help? Call 911 if you have symptoms of sudden heart failure such as: 
? · You have severe trouble breathing. ? · You cough up pink, foamy mucus. ? · You have a new irregular or rapid heartbeat. ?Call your doctor now or seek immediate medical care if: 
? · You have new or increased shortness of breath. ? · You are dizzy or lightheaded, or you feel like you may faint. ? · You have sudden weight gain, such as more than 2 to 3 pounds in a day or 5 pounds in a week. (Your doctor may suggest a different range of weight gain.) ? · You have increased swelling in your legs, ankles, or feet. ? · You are suddenly so tired or weak that you cannot do your usual activities. ? Watch closely for changes in your health, and be sure to contact your doctor if you develop new symptoms. Where can you learn more? Go to http://hood-desi.info/. Enter D022 in the search box to learn more about \"Avoiding Triggers With Heart Failure: Care Instructions. \" Current as of: September 21, 2016 Content Version: 11.4 © 7721-3508 Healthwise, Incorporated.  Care instructions adapted under license by [address] (which disclaims liability or warranty for this information). If you have questions about a medical condition or this instruction, always ask your healthcare professional. [address] any warranty or liability for your use of this information. Please provide this summary of care documentation to your next provider. Signatures-by signing, you are acknowledging that this After Visit Summary has been reviewed with you and you have received a copy. Patient Signature:  ____________________________________________________________ Date:  ____________________________________________________________  
  
[facility] Provider Signature:  ____________________________________________________________ Date:  ____________________________________________________________

## 2018-05-17 NOTE — PROGRESS NOTES
Admission Medication Reconciliation:    Information obtained from:  Patient, insurance claim history, updated medication list from home. Comments/Recommendations: All medications/allergies have been reviewed and updated; last medication administration times reviewed and recorded. The patient was an excellent historian and could recall names and doses of medications. She also provided me with an updated list of medications of which name, doses, and frequencies were confirmed via insurance claim history. Changes made to Prior to Admission (PTA) Medication List:   ?   Medications Added:   - Preservision Areds 2   ? Medications Changed:   - Changed lisinopril 40 mg daily to lisinopril 5 mg BID  - Changed regular insulin to Lantus 10 units BID  ? Medications Removed:   - None       Significant PMH/Disease States:   Past Medical History:   Diagnosis Date    MORGAN (acute kidney injury) ([facility] 3/25/2018    Arthritis     Chronic obstructive pulmonary disease ([facility] 7/23/2017    Cor pulmonale (HCC) 8/29/2017    Diabetes ([facility]     Elevated brain natriuretic peptide (BNP) level 7/23/2017    Essential hypertension 7/23/2017    Shortness of breath     Thyroid disease        Chief Complaint for this Admission:    Chief Complaint   Patient presents with    Shortness of Breath       Allergies: Other medication    Prior to Admission Medications:   Prior to Admission Medications   Prescriptions Last Dose Informant Patient Reported? Taking?   aspirin delayed-release 81 mg tablet 5/17/2018 at AM  Yes Yes   Sig: Take  by mouth daily. bumetanide (BUMEX) 2 mg tablet 5/17/2018 at AM  Yes Yes   Sig: Take 2 mg by mouth daily. carvedilol (COREG) 6.25 mg tablet 5/17/2018 at AM  Yes Yes   Sig: Take  by mouth two (2) times daily (with meals). digoxin (LANOXIN) 0.125 mg tablet 5/16/2018  Yes No   Sig: Take 0.125 mg by mouth every other day.  Monday, Wednesday, Friday, and Saturday   insulin glargine (LANTUS U-100 INSULIN) 100 unit/mL injection 5/17/2018  Yes Yes   Sig: 10 Units by SubCUTAneous route two (2) times a day. levothyroxine (SYNTHROID) 100 mcg tablet 5/17/2018  Yes Yes   Sig: Take 100 mcg by mouth six (6) days a week. Monday through Saturday   levothyroxine (SYNTHROID) 100 mcg tablet 5/13/2018  Yes Yes   Sig: Take 200 mcg by mouth every Sunday. lisinopril (PRINIVIL, ZESTRIL) 5 mg tablet 5/17/2018 at AM  Yes Yes   Sig: Take 5 mg by mouth two (2) times a day. vit A/vit C/vit E/zinc/copper (PRESERVISION AREDS PO)   Yes Yes   Sig: Take 1 Tab by mouth daily. Facility-Administered Medications: None     Thank you for allowing pharmacy to participate in the coordination of this patient's care. If you have any other questions, please contact the medication reconciliation pharmacist at [phone]. Zehra Gibson, Pharm. D.

## 2018-05-18 NOTE — PROGRESS NOTES
0800: Bedside shift change report given to Katehrin Matamoros RN (oncoming nurse) by Mario Alberto Gallegos RN (offgoing nurse). Report included the following information SBAR, Kardex, Recent Results     Primary Nurse Flory Plummer and Nelson Davis RN performed a dual skin assessment on this patient No impairment noted  Bandar score is 21    1540: Bedside shift change report given to Sujatha Joseph RN (oncoming nurse) by Katherin Matamoros RN (offgoing nurse). Report included the following information SBAR, Kardex, Intake/Output, MAR, Accordion and Recent Results.

## 2018-05-18 NOTE — PROGRESS NOTES
TRANSFER - OUT REPORT:    Verbal report given to Sallie(name) on Yue Purvis     for routine progression of care       Report consisted of patients Situation, Background, Assessment and   Recommendations(SBAR). Information from the following report(s) SBAR, Kardex, ED Summary, Intake/Output, MAR, Accordion, Recent Results, Med Rec Status and Cardiac Rhythm NSR was reviewed with the receiving nurse. Lines:   Peripheral IV 05/17/18 Left Forearm (Active)   Site Assessment Clean, dry, & intact 5/17/2018  8:52 PM   Phlebitis Assessment 0 5/17/2018  8:52 PM   Infiltration Assessment 0 5/17/2018  8:52 PM   Dressing Status Clean, dry, & intact 5/17/2018  8:52 PM   Dressing Type Transparent 5/17/2018  8:52 PM   Hub Color/Line Status End cap changed 5/17/2018  8:52 PM   Action Taken Open ports on tubing capped 5/17/2018  8:52 PM   Alcohol Cap Used Yes 5/17/2018  8:52 PM        Opportunity for questions and clarification was provided.       Patient transported with:   O2 @ 2 liters

## 2018-05-18 NOTE — ACP (ADVANCE CARE PLANNING)
Comments: Paged by Nurse Zuly Coughlin to provide advanced medical directive consult for Ms. Vipul Zarate. Arrived to find her alone in room. She welcomed visit and went on to explain that she was not interested in an AMD, but desired information on naming a financial power of . Her primary concern was that her bills be paid while she was hospitalized. She ultimately determined that her brother is best suited for that role and that she will begin that process upon discharge. [facility] Staff  (Brandi 5 Patient Care Specialist)   Paging Service [phone])

## 2018-05-18 NOTE — PROGRESS NOTES
0800 - Bedside and Verbal shift change report given to Seth (oncoming nurse) by Eduardo Garcia (offgoing nurse). Report included the following information SBAR, Kardex, ED Summary, Procedure Summary, Intake/Output, MAR, Accordion, Recent Results, Med Rec Status and Cardiac Rhythm NSR. Problem: Heart Failure: Day 2  Goal: Activity/Safety  Outcome: Progressing Towards Goal  Up with x1 assist using her rollator. Goal: Nutrition/Diet  Outcome: Progressing Towards Goal  Diabetic Consistent Carb diet    Goal: Medications  Outcome: Progressing Towards Goal  Pt compliant with medication regimen. Goal: Respiratory  Outcome: Progressing Towards Goal  Pt weaned to room air. SaO2 remaining above 90%    Problem: Falls - Risk of  Goal: *Absence of Falls  Document Cathy Fall Risk and appropriate interventions in the flowsheet. Outcome: Progressing Towards Goal  Fall Risk Interventions:  Mobility Interventions: Communicate number of staff needed for ambulation/transfer, Patient to call before getting OOB, Utilize walker, cane, or other assitive device         Medication Interventions: Assess postural VS orthostatic hypotension, Evaluate medications/consider consulting pharmacy, Patient to call before getting OOB, Teach patient to arise slowly    Elimination Interventions: Call light in reach, Patient to call for help with toileting needs     Pt has had no falls during admission. Call bell and belongings within reach. Pt to call before getting OOB.

## 2018-05-18 NOTE — NURSE NAVIGATOR
Called [facility] for follow up appointment. Follow up scheduled for 5/24/18 at 1:30 PM.  Information on After Visit Summary .

## 2018-05-18 NOTE — NURSE NAVIGATOR
Chart reviewed by Heart Failure Nurse Navigator. Heart Failure database completed. EF:  55/60% (7/2017); current admission echo pending    ACEi/ARB: lisinopril 5 mg, daily    BB: coreg 6.25 mg twice daily    Aldosterone Antagonist: not currently indicated    CRT not currently indicated    NYHA Functional Class documentation requested via Provider Message on [address] [place] [address] Failure Teach Back in Patient Education. Heart Failure Avoiding Triggers on Discharge Instructions.       Cardiologist: Dr. Ival Nissen ([facility])    HANNAH Cifuentes ([facility])

## 2018-05-18 NOTE — ROUTINE PROCESS
Bedside and Verbal shift change report given to Anil Solsi rn (oncoming nurse) by LAWRENCE Ch (offgoing nurse). Report included the following information SBAR, Kardex, Intake/Output, MAR, Recent Results and Cardiac Rhythm a flutter.

## 2018-05-18 NOTE — PROGRESS NOTES
Met with patient at bedside regarding consult for oxygen concentrator. Patient has oxygen at home with [address] and she states she does not need a concentrator. She has one at home. She may need a portable tank at discharge.     Geovany Renteria RN CRM  Ext [phone]

## 2018-05-18 NOTE — PROGRESS NOTES
Problem: Heart Failure: Day 3  Goal: Respiratory  Outcome: Progressing Towards Goal  While working with OT, pt's O2 decreased to mid 80's. O2 was increased to 4L. In the future, O2 may need to be increased during activity. RN passed along information to oncoming nurse.

## 2018-05-18 NOTE — PROGRESS NOTES
Advance Care Planning Note    Name: Frank Ro  YOB: 1935  MRN: [id number]  Admission Date: 5/17/2018  3:02 PM    Date of discussion: 5/18/2018    Active Diagnoses:    Hospital Problems  Date Reviewed: 3/25/2018          Codes Class Noted POA    * (Principal)CHF exacerbation ([facility] [address] ICD-10-CM: I50.9  ICD-9-CM: 428.0  5/17/2018 Yes               These active diagnoses are of sufficient risk that focused discussion on advance care planning is indicated in order to allow the patient to thoughtfully consider personal goals of care, and if situations arise that prevent the ability to personally give input, to ensure appropriate representation of their personal desires for different levels and aggressiveness of care. Discussion:     Persons present and participating in discussion: Frank Ro, Balta Wise MD,     Discussion: I discussed with the pt she wants to get a mPOA for future but she told me she does not want any life support/ CPR or feeding tube     Time Spent:     Total time spent face-to-face in education and discussion: 20  minutes.      Balta Wise MD  5/18/2018  11:08 AM

## 2018-05-18 NOTE — DIABETES MGMT
DTC Progress Note    Recommendations/ Comments:  Patient hypoglycemic this am, but blood sugars ranging 60 - 380 mg/dL. Low blood sugar most likely due to 16 units of correction insulin given at bedtime. If appropriate, please consider:   - increasing Lantus to 12 units  - adding Glimepiride 1 mg if patient eating at least 50 %     Current hospital DM medication: Lantus 10 units BID, Lispro correction, normal sensitivity    [facility] Patient is a 81 y.o. female with a history of diabetes on insulin injections: Lantus : 10 units BID at home. A1c:   Lab Results   Component Value Date/Time    Hemoglobin A1c 9.5 (H) 05/18/2018 04:00 AM    Hemoglobin A1c, External 6.4 10/05/2017    Hemoglobin A1c, External 6.8 06/22/2017       Recent Glucose Results:   Lab Results   Component Value Date/Time    GLU 60 (L) 05/18/2018 04:00 AM     (H) 05/17/2018 03:28 PM    GLUCPOC 304 (H) 05/18/2018 11:30 AM    GLUCPOC 184 (H) 05/18/2018 06:44 AM    GLUCPOC 380 (H) 05/17/2018 09:43 PM        Lab Results   Component Value Date/Time    Creatinine 1.46 (H) 05/18/2018 04:00 AM     Estimated Creatinine Clearance: 24.8 mL/min (based on Cr of 1.46). Active Orders   Diet    DIET DIABETIC CONSISTENT CARB Regular        PO intake: No data found. Will continue to follow as needed.     Thank you  Haider Barragan, MS, RN, CDE

## 2018-05-18 NOTE — ED NOTES
TRANSFER - OUT REPORT:    Verbal report given to LAWRENCE Acosta(name) on Clarinda Duverney  being transferred to [facility](unit) for routine progression of care       Report consisted of patients Situation, Background, Assessment and   Recommendations(SBAR). Information from the following report(s) SBAR, ED Summary, Procedure Summary, MAR, Recent Results and Cardiac Rhythm nsr was reviewed with the receiving nurse. Lines:   Peripheral IV 05/17/18 Left Forearm (Active)   Site Assessment Clean, dry, & intact 5/17/2018  3:33 PM   Phlebitis Assessment 0 5/17/2018  3:33 PM   Infiltration Assessment 0 5/17/2018  3:33 PM   Dressing Status Clean, dry, & intact 5/17/2018  3:33 PM   Dressing Type Transparent 5/17/2018  3:33 PM   Hub Color/Line Status Pink;Flushed;Patent 5/17/2018  3:33 PM   Action Taken Blood drawn 5/17/2018  3:33 PM   Alcohol Cap Used No 5/17/2018  3:33 PM        Opportunity for questions and clarification was provided.       Patient transported with:   Monitor  Registered Nurse

## 2018-05-18 NOTE — PROGRESS NOTES
Hospitalist Progress Note  Valerio Lucas MD  Answering service: [phone] from in house phone        Date of Service:  2018  NAME:  Katelynn Saleem  :  1935  MRN:  [id number]      Admission Summary: This is an 58-year-old white female with history of chronic respiratory failure due to COPD on home O2, cor pulmonale, right-sided heart failure, diabetes, CKD and hypothyroidism. Patient feels increasing shortness of breath last several days and also noticed increasing both leg swelling. She noticed 15-20 pounds weight gaining over the last several months and today patient feels extremely short of breath, not able to ambulate due to this, so he came to the ED. Today, the patient noticed more hypoxia in the ED, required increased O2 requirement. Interval history / Subjective: My breathing is improved and leg swelling also improved    Assessment & Plan:     1. Acute on chronic hypoxic respiratory failure. - probably due to congestive heart failure exacerbation on top of her chronic obstructive pulmonary disease. In the setting of volume overload     2. Acute on chronic biventricular congestive heart failure exacerbation NYHA 2-3  - - get echo cnt diuresis  - Chest x-ray also noticed interstitial edema and with high BNP   - pt was on digoxin abd BB ( ran out of digoxin )   - cont lasix 80 mg IV q. 12 hours.      3. Possible chronic kidney disease stage IV,   - Cr 1.4 improved by? n - We will follow her creatinine when on diuretics. - May get  medical records from his PCP to find out his baseline creatinine and BUN. 4.  Diabetes, uncontrolled. We will continue Lantus and sliding scale. 5.  Hypertension. Continue carvedilol. We will temporarily hold lisinopril due to the level of the renal concern. 6.  Hypothyroidism. We will continue Synthroid. 7.  Chronic obstructive pulmonary disease.   We will continue neb treatment. At this moment, there is no exacerbation. 8. Hypokalemia- mild from diuresis replete     Code status: DNR  DVT prophylaxis: SCD    Care Plan discussed with: Patient/Family and Nurse  Disposition: TBD     Hospital Problems  Date Reviewed: 3/25/2018          Codes Class Noted POA    * (Principal)CHF exacerbation ([facility] [address] ICD-10-CM: I50.9  ICD-9-CM: 428.0  5/17/2018 Yes                Review of Systems:   A comprehensive review of systems was negative. Vital Signs:    Last 24hrs VS reviewed since prior progress note. Most recent are:  Visit Vitals    /61 (BP 1 Location: Right arm, BP Patient Position: At rest)    Pulse (!) 101    Temp 98.6 °F (37 °C)    Resp 20    Ht 5' (1.524 m)    Wt 61.6 kg (135 lb 12.9 oz)    SpO2 94%    BMI 26.52 kg/m2         Intake/Output Summary (Last 24 hours) at 05/18/18 1036  Last data filed at 05/18/18 0854   Gross per 24 hour   Intake                0 ml   Output              900 ml   Net             -900 ml        Physical Examination:             Constitutional:  No acute distress, cooperative   ENT:  Oral mucous moist   Resp:  CTA bilaterally. CV:  Regular rhythm, normal rate,     GI:  Soft, non distended, non tender. bs+    Musculoskeletal:  No edema, warm, 2+ pulses throughout    Neurologic:  Moves all extremities. AAOx3, CN II-XII reviewed     Psych:  Good insight, Not anxious nor agitated.        Data Review:    I personally reviewed  Image and labs      Labs:     Recent Labs      05/18/18   0400  05/17/18   1528   WBC  12.2*  8.6   HGB  12.7  12.7   HCT  39.0  40.0   PLT  189  167     Recent Labs      05/18/18   0400  05/17/18   1528   NA  142  135*   K  3.4*  4.6   CL  104  99   CO2  30  24   BUN  29*  32*   CREA  1.46*  1.71*   GLU  60*  458*   CA  8.5  8.5   MG  2.2   --    PHOS  3.6   --      Recent Labs      05/18/18   0400  05/17/18   1528   SGOT  13*  20   ALT  19  24   AP  84  99   TBILI  0.3  0.3   TP  6.5  7.0   ALB  2.8* 2.9*   GLOB  3.7  4.1*   LPSE   --   178     No results for input(s): INR, PTP, APTT in the last 72 hours. No lab exists for component: INREXT   No results for input(s): FE, TIBC, PSAT, FERR in the last 72 hours. No results found for: FOL, RBCF   No results for input(s): PH, PCO2, PO2 in the last 72 hours.   Recent Labs      05/18/18   0400  05/17/18   1528   TROIQ  <0.04  <0.04     No results found for: CHOL, CHOLX, CHLST, CHOLV, HDL, LDL, LDLC, DLDLP, TGLX, TRIGL, TRIGP, CHHD, CHHDX  Lab Results   Component Value Date/Time    Glucose (POC) 184 (H) 05/18/2018 06:44 AM    Glucose (POC) 380 (H) 05/17/2018 09:43 PM     Lab Results   Component Value Date/Time    Color YELLOW/STRAW 05/17/2018 03:43 PM    Appearance CLEAR 05/17/2018 03:43 PM    Specific gravity 1.013 05/17/2018 03:43 PM    pH (UA) 6.5 05/17/2018 03:43 PM    Protein 100 (A) 05/17/2018 03:43 PM    Glucose >1000 (A) 05/17/2018 03:43 PM    Ketone NEGATIVE  05/17/2018 03:43 PM    Bilirubin NEGATIVE  05/17/2018 03:43 PM    Urobilinogen 0.2 05/17/2018 03:43 PM    Nitrites NEGATIVE  05/17/2018 03:43 PM    Leukocyte Esterase NEGATIVE  05/17/2018 03:43 PM    Epithelial cells FEW 05/17/2018 03:43 PM    Bacteria NEGATIVE  05/17/2018 03:43 PM    WBC 0-4 05/17/2018 03:43 PM    RBC 5-10 05/17/2018 03:43 PM         Medications Reviewed:     Current Facility-Administered Medications   Medication Dose Route Frequency    albuterol-ipratropium (DUO-NEB) 2.5 MG-0.5 MG/3 ML  3 mL Nebulization BID RT    aspirin delayed-release tablet 81 mg  81 mg Oral DAILY    carvedilol (COREG) tablet 6.25 mg  6.25 mg Oral BID WITH MEALS    digoxin (LANOXIN) tablet 0.125 mg  0.125 mg Oral Q M, W, F & SAT    insulin glargine (LANTUS) injection 10 Units  10 Units SubCUTAneous BID    levothyroxine (SYNTHROID) tablet 100 mcg  100 mcg Oral Once per day on Mon Tue Wed Thu Fri Sat    [START ON 5/20/2018] levothyroxine (SYNTHROID) tablet 200 mcg  200 mcg Oral every Sunday    furosemide (LASIX) injection 80 mg  80 mg IntraVENous BID    sodium chloride (NS) flush 5-10 mL  5-10 mL IntraVENous Q8H    sodium chloride (NS) flush 5-10 mL  5-10 mL IntraVENous PRN    naloxone (NARCAN) injection 0.4 mg  0.4 mg IntraVENous PRN    zolpidem (AMBIEN) tablet 5 mg  5 mg Oral QHS PRN    enoxaparin (LOVENOX) injection 30 mg  30 mg SubCUTAneous Q24H    acetaminophen (TYLENOL) tablet 650 mg  650 mg Oral Q4H PRN    ondansetron (ZOFRAN) injection 4 mg  4 mg IntraVENous Q4H PRN    glucose chewable tablet 16 g  4 Tab Oral PRN    dextrose (D50W) injection syrg 12.5-25 g  12.5-25 g IntraVENous PRN    glucagon (GLUCAGEN) injection 1 mg  1 mg IntraMUSCular PRN    insulin lispro (HUMALOG) injection   SubCUTAneous AC&HS     ______________________________________________________________________  EXPECTED LENGTH OF STAY: - - -  ACTUAL LENGTH OF STAY:          1                 Balta Wise MD

## 2018-05-18 NOTE — PROGRESS NOTES
Orders received, chart reviewed and patient evaluated by physical therapy. Recommend patient to discharge to home and HHPT for follow up pending progress with skilled acute care physical therapy. Recommend with nursing patient to complete as able in order to maintain strength, endurance and independence: OOB to chair 3x/day with assist X 1 and ambulating with assist X 1 using her rollator as tolerated. . Thank you for your assistance. Full evaluation to follow.      Saud León, PT

## 2018-05-18 NOTE — PROGRESS NOTES
Problem: Self Care Deficits Care Plan (Adult)  Goal: *Acute Goals and Plan of Care (Insert Text)  Occupational Therapy Goals  Initiated 5/18/2018  1. Patient will perform ADLs standing 5 mins without LOB and VSS with modified independence within 7 day(s). 2.  Patient will perform lower body ADLs with modified independence within 7 day(s). 3.  Patient will perform toileting with modified independence within 7 day(s). 4.  Patient will perform toilet transfers with modified independence within 7 day(s). 5.  Patient will participate in BUE therapeutic exercise/activities to increase independence with ADLs with independence and VSS stable for 5 minutes within 7 day(s). Occupational Therapy EVALUATION  Patient: Marisela Jimenes (7 y.o. female)  Date: 5/18/2018  Primary Diagnosis: CHF exacerbation [facility])        Precautions: Fall    ASSESSMENT :  Based on the objective data described below, the patient presents with overall independence-supervision for ADLs and functional mobility s/p CHF exacerbation. Completed bed & functional mobility to the bathroom with supervision, rollator, & portable O2. Patient requiring 2-3L NC at rest, however up to 6L NC, rest breaks, & PLB with activity. Patient reporting slight HAYS with activity, O2 sats dropping as low as 87%, able to recover within 30 sec. Able to complete upper and lower body dressing sitting EOB with supervision. Patient appears to be close to her Mod I baseline, however is requiring increased O2 assist to complete activities. Pending progress, recommend HHOT vs. TBD to maximize decreased activity tolerance and functional independence. Patient will benefit from skilled intervention to address the above impairments.   Patients rehabilitation potential is considered to be Good  Factors which may influence rehabilitation potential include:   []             None noted  []             Mental ability/status  []             Medical condition  [] Home/family situation and support systems  []             Safety awareness  []             Pain tolerance/management  []             Other:      PLAN :  Recommendations and Planned Interventions:  [x]               Self Care Training                  [x]        Therapeutic Activities  [x]               Functional Mobility Training    []        Cognitive Retraining  [x]               Therapeutic Exercises           [x]        Endurance Activities  [x]               Balance Training                   []        Neuromuscular Re-Education  []               Visual/Perceptual Training     [x]   Home Safety Training  [x]               Patient Education                 [x]        Family Training/Education  []               Other (comment):    Frequency/Duration: Patient will be followed by occupational therapy 3 times a week to address goals. Discharge Recommendations: Home Health vs. TBD  Further Equipment Recommendations for Discharge: TBD     SUBJECTIVE:   Patient stated I just get so tired doing things, why is that? Tea Inman    OBJECTIVE DATA SUMMARY:   HISTORY:   Past Medical History:   Diagnosis Date    MORGAN (acute kidney injury) ([facility] [address] 3/25/2018    Arthritis     Chronic obstructive pulmonary disease ([facility] [address] 7/23/2017    Cor pulmonale ([facility] [address] 8/29/2017    Diabetes ([facility] [address]     Elevated brain natriuretic peptide (BNP) level 7/23/2017    Essential hypertension 7/23/2017    Shortness of breath     Thyroid disease      Past Surgical History:   Procedure Laterality Date    HX CATARACT REMOVAL Bilateral        Prior Level of Function/Environment/Context: PTA, patient was Mod I-independent living in a condo alone, using rollator for functional mobility. Deos not drive as she has 2L NC portable O2. Has a sister that lives nearby. Had a recent fall in earlier this year and went to rehab, then d/c home with HHOT/PT.       Home Situation  Home Environment: Apartment (Condo)  One/Two Story Residence: One story  Living Alone: Yes  Support Systems: Family member(s) (Sister lives nearby)  Patient Expects to be Discharged to[de-identified] Apartment  Current DME Used/Available at Home: Adaptive bathing aides, Grab bars, Oxygen, portable, Raised toilet seat, Walker, rollator (LHBS)  Tub or Shower Type: Tub/Shower combination  [x]  Right hand dominant   []  Left hand dominant    EXAMINATION OF PERFORMANCE DEFICITS:  Cognitive/Behavioral Status:  Neurologic State: Alert  Orientation Level: Oriented X4;Appropriate for age  Cognition: Appropriate decision making; Appropriate for age attention/concentration; Follows commands  Perception: Appears intact  Perseveration: No perseveration noted  Safety/Judgement: Awareness of environment; Fall prevention    Skin: Appears intact, healing bruise under L eye from previous fall    Edema: None noted in BUEs (significantly decreased swelling in BLEs from yesterday per patient report)    Hearing: Auditory  Auditory Impairment: None    Vision/Perceptual:    Tracking: Able to track stimulus in all quadrants w/o difficulty    Acuity: Within Defined Limits         Range of Motion:  In BUEs  AROM: Generally decreased, functional (Reports difficulty reaching back)  PROM: Within functional limits    Strength: In BUEs  Strength: Within functional limits    Coordination:  Coordination: Within functional limits  Fine Motor Skills-Upper: Left Intact; Right Intact    Gross Motor Skills-Upper: Left Intact; Right Intact    Tone & Sensation:  In BUEs  Tone: Normal  Sensation: Intact    Balance:  Sitting: Intact; Without support  Standing: Intact; With support (Rollator)    Functional Mobility and Transfers for ADLs:  Bed Mobility:  Supine to Sit: Supervision  Sit to Supine:  (NT--sitting EOB with MD present)  Scooting: Supervision    Transfers:  Sit to Stand: Supervision; Adaptive equipment (rollator)  Stand to Sit: Supervision; Adaptive equipment (rollator)  Toilet Transfer : Supervision; Adaptive equipment (rollator)    ADL Assessment:  Feeding: Independent*    Oral Facial Hygiene/Grooming: Supervision    Bathing: Supervision*    Upper Body Dressing: Independent    Lower Body Dressing: Supervision    Toileting: Supervision   *Inferred per BUE ROM/coordination, activity tolerance, functional mobility, safety awareness, and functional reach             ADL Intervention and task modifications:       Grooming  Grooming Assistance: Contact guard assistance (Slight LOB when reaching for paper towel to dry hands)  Washing Hands: Contact guard assistance    Upper Body Dressing Assistance  Dressing Assistance: Supervision/set-up (Sitting EOB)  Hospital Gown: Supervision/ set-up    Lower Body Dressing Assistance  Dressing Assistance: Supervision/set up  Socks: Supervision/set-up (Doff & don x2)  Leg Crossed Method Used: Yes  Position Performed: Long sitting on bed;Seated edge of bed    Toileting  Toileting Assistance: Supervision/set up  Bladder Hygiene: Modified independent (Grab bars)  Clothing Management: Supervision/set-up  Cues: Verbal cues provided (Safety with transfer)  Adaptive Equipment: Grab bars    Cognitive Retraining  Safety/Judgement: Awareness of environment; Fall prevention    Therapeutic Exercise:  Ambulated to/from bathroom with supervision and rollator, verbal cues required for line & O2 management with all functional mobility, decreased O2 sats with activity on 6L O2  - Sitting EOB/ upright x10 min with supervision    Functional Measure:  Barthel Index:    Bathin  Bladder: 10  Bowels: 10  Groomin  Dressing: 10  Feeding: 10  Mobility: 10  Stairs: 0  Toilet Use: 10  Transfer (Bed to Chair and Back): 10  Total: 80       Barthel and G-code impairment scale:  Percentage of impairment CH  0% CI  1-19% CJ  20-39% CK  40-59% CL  60-79% CM  80-99% CN  100%   Barthel Score 0-100 100 99-80 79-60 59-40 20-39 1-19   0   Barthel Score 0-20 20 17-19 13-16 9-12 5-8 1-4 0      The Barthel ADL Index: Guidelines  1.  The index should be used as a record of what a patient does, not as a record of what a patient could do. 2. The main aim is to establish degree of independence from any help, physical or verbal, however minor and for whatever reason. 3. The need for supervision renders the patient not independent. 4. A patient's performance should be established using the best available evidence. Asking the patient, friends/relatives and nurses are the usual sources, but direct observation and common sense are also important. However direct testing is not needed. 5. Usually the patient's performance over the preceding 24-48 hours is important, but occasionally longer periods will be relevant. 6. Middle categories imply that the patient supplies over 50 per cent of the effort. 7. Use of aids to be independent is allowed. Luis Angel Guthrie., Barthel, D.W. (4651). Functional evaluation: the Barthel Index. [facility] (14)2. [facility] ruben MINOR Cardenas, Serina Jhaveri., Farnaz Walker., [place], [address] [place] [address] (1999). Measuring the change indisability after inpatient rehabilitation; comparison of the responsiveness of the Barthel Index and Functional [place] Measure. Journal of Neurology, Neurosurgery, and Psychiatry, 66(4), 621-443. Yogesh Santiago, N.J.A, SHAGGY Pearl.ANTONIO, & Kendrick Mccollum, M.A. (2004.) Assessment of post-stroke quality of life in cost-effectiveness studies: The usefulness of the Barthel Index and the EuroQoL-5D. Quality of Life Research, 13, 415-02         G codes: In compliance with CMSs Claims Based Outcome Reporting, the following G-code set was chosen for this patient based on their primary functional limitation being treated: The outcome measure chosen to determine the severity of the functional limitation was the Barthel Index with a score of 80/100 which was correlated with the impairment scale. ?  Self Care:     - CURRENT STATUS: CI - 1%-19% impaired, limited or restricted    - GOAL STATUS: CH - 0% impaired, limited or restricted    - D/C STATUS:  ---------------To be determined---------------     Occupational Therapy Evaluation Charge Determination   History Examination Decision-Making   LOW Complexity : Brief history review  LOW Complexity : 1-3 performance deficits relating to physical, cognitive , or psychosocial skils that result in activity limitations and / or participation restrictions  LOW Complexity : No comorbidities that affect functional and no verbal or physical assistance needed to complete eval tasks       Based on the above components, the patient evaluation is determined to be of the following complexity level: LOW   Pain:  Pain Scale 1: Numeric (0 - 10)  Pain Intensity 1: 0              Activity Tolerance:   Good, decreased O2 sats with, on 2-3 L NC at rest, requiring up to 6L NC and increased time/rest breaks with activity    Please refer to the flowsheet for vital signs taken during this treatment. After treatment:   [x] Patient left in no apparent distress sitting EOB (MD present, RN aware)  [] Patient left in no apparent distress in bed  [x] Call bell left within reach  [x] Nursing notified  [] Caregiver present  [] Bed alarm activated    COMMUNICATION/EDUCATION:   The patients plan of care was discussed with: Physical Therapist, Registered Nurse and Physician. [x] Home safety education was provided and the patient/caregiver indicated understanding. [x] Patient/family have participated as able in goal setting and plan of care. [x] Patient/family agree to work toward stated goals and plan of care. [] Patient understands intent and goals of therapy, but is neutral about his/her participation. [] Patient is unable to participate in goal setting and plan of care. This patients plan of care is appropriate for delegation to [facility].     Thank you for this referral.  Zaria Suárez, OT  Time Calculation: 39 mins

## 2018-05-18 NOTE — PROGRESS NOTES
Problem: Mobility Impaired (Adult and Pediatric)  Goal: *Acute Goals and Plan of Care (Insert Text)  Physical Therapy Goals  Initiated 5/18/2018  1. Patient will move from supine to sit and sit to supine , scoot up and down and roll side to side in bed with independence within 7 day(s). 2.  Patient will transfer from bed to chair and chair to bed with modified independence using the least restrictive device within 7 day(s). 3.  Patient will perform sit to stand with modified independence within 7 day(s). 4.  Patient will ambulate with modified independence for 300 feet with the least restrictive device within 7 day(s). 5.  Patient will ascend/descend 12 stairs with one handrail(s) with modified independence within 7 day(s). physical Therapy EVALUATION  Patient: Katelynn Saleem (11 y.o. female)  Date: 5/18/2018  Primary Diagnosis: CHF exacerbation ([facility] [address]        Precautions:   Fall    ASSESSMENT :  Based on the objective data described below, the patient presents with stated need to amb to the bathroom for a BM. She was on 02 at 4 liters throughout session. She came to sit at [facility] and stood with supervision and then amb the short distance to the bathroom (10 feet) with contact guard and assist for equipment management. Note tendency for \"furniture and wall walking\". Once on the commode her 02 sats were at 88% which quickly cathy into the 90s with pursed lip breathing. After use of the bathroom she amb to a chair beside the bed, again as described above and this time post amb her 02 sat dropped to 84% and cathy to 92% with pursed lip breathing in about one minute. Post session she remained up to the chair. Overall, pt is moving close to her baseline, her limiting factor is her respiratiory status. Anticipate return to home at time of discharge and recommend HHPT for follow up.  For the weekend I recommend that she is up to the chair for all meals and amb with staff in the hallway with her rollator (it's in her room here). PT to return after the weekend. Pt will require a six minute walk test secondary to heart failure. .    Patient will benefit from skilled intervention to address the above impairments. Patients rehabilitation potential is considered to be Good  Factors which may influence rehabilitation potential include:   []         None noted  []         Mental ability/status  [x]         Medical condition  [x]         Home/family situation and support systems  []         Safety awareness  []         Pain tolerance/management  []         Other:      PLAN :  Recommendations and Planned Interventions:  [x]           Bed Mobility Training             []    Neuromuscular Re-Education  [x]           Transfer Training                   []    Orthotic/Prosthetic Training  [x]           Gait Training                         []    Modalities  [x]           Therapeutic Exercises           []    Edema Management/Control  [x]           Therapeutic Activities            [x]    Patient and Family Training/Education  []           Other (comment):    Frequency/Duration: Patient will be followed by physical therapy  5 times a week to address goals. Discharge Recommendations: Home Health  Further Equipment Recommendations for Discharge: none     SUBJECTIVE:   Patient stated I need to get to the bathroom.     OBJECTIVE DATA SUMMARY:   Consult received, chart reviewed, pt cleared by nursing  HISTORY:    Past Medical History:   Diagnosis Date    MORGAN (acute kidney injury) ([facility] [address] 3/25/2018    Arthritis     Chronic obstructive pulmonary disease ([address] 7/23/2017    Cor pulmonale ([address] 8/29/2017    Diabetes ([facility] [address]     Elevated brain natriuretic peptide (BNP) level 7/23/2017    Essential hypertension 7/23/2017    Shortness of breath     Thyroid disease      Past Surgical History:   Procedure Laterality Date    HX CATARACT REMOVAL Bilateral      Prior Level of Function/Home Situation: independent/mod I using a rollator, primarily outside of the home. Uses home 02. Reports one fall in the last 24 hours  Personal factors and/or comorbidities impacting plan of care: lives alone, her sister provides transportation/groceries     Home Situation  Home Environment:  (condo)  # Steps to Enter: 12 (6,3,3)  Rails to Enter: Yes  Hand Rails : Right  One/Two Story Residence: One story  Living Alone: Yes  Support Systems: Family member(s)  Patient Expects to be Discharged to[de-identified]  (Condo)  Current DME Used/Available at Home: Adaptive bathing aides, Grab bars, Oxygen, portable, Raised toilet seat, Walker, rollator (LHBS)  Tub or Shower Type: Tub/Shower combination    EXAMINATION/PRESENTATION/DECISION MAKING:   Critical Behavior:  Neurologic State: Alert  Orientation Level: Oriented X4  Cognition: Follows commands  Safety/Judgement: Awareness of environment, Fall prevention  Hearing: Auditory  Auditory Impairment: None  Skin:  Refer to MD and nursing notes  Edema: refer to MD and nursing notes  Range Of Motion:  AROM: Generally decreased, functional                      Strength:    Strength: Generally decreased, functional                    Tone & Sensation:                 Sensation: Intact               Coordination:  Coordination: Within functional limits  Vision:   Tracking: Able to track stimulus in all quadrants w/o difficulty  Acuity: Within Defined Limits  Functional Mobility:  Bed Mobility:     Supine to Sit: Supervision    Scooting: Supervision  Transfers:  Sit to Stand: Supervision  Stand to Sit: Supervision                       Balance:   Sitting: Intact; Without support  Standing: Intact; With support  Ambulation/Gait Training:  Distance (ft): 20 Feet (ft) (10 feet X 2)  Assistive Device: Gait belt  Ambulation - Level of Assistance: Contact guard assistance        Gait Abnormalities: Decreased step clearance        Base of Support: Narrowed     Speed/Jaida: Slow;Pace decreased (<100 feet/min)  Step Length: Left shortened;Right shortened Stairs: Therapeutic Exercises:   Pursed lip breathing    Functional Measure:  Timed up and go:    Timed Get Up And Go Test: 16 (extrapolated)     Timed Up and Go and G-code impairment scale:  Percentage of Impairment CH    0%   CI    1-19% CJ    20-39% CK    40-59% CL    60-79% CM    80-99% CN     100%   Timed   Score 0-56 10 11-12 13-14 15-16 17-18 19 20       < than 10 seconds=Normal  Greater then 13.5 seconds (in elderly)=Increased fall risk   Jasson Junior [place] M. Predicting the probability for falls in community dwelling older adults using the Timed Up and Go Test. Phys Ther. 2000;80:896-903. G codes: In compliance with CMSs Claims Based Outcome Reporting, the following G-code set was chosen for this patient based on their primary functional limitation being treated: The outcome measure chosen to determine the severity of the functional limitation was the TUG with a score of 16 which was correlated with the impairment scale.     ? Mobility - Walking and Moving Around:     - CURRENT STATUS: CK - 40%-59% impaired, limited or restricted    - GOAL STATUS: CI - 1%-19% impaired, limited or restricted    - D/C STATUS:  ---------------To be determined---------------      Physical Therapy Evaluation Charge Determination   History Examination Presentation Decision-Making   HIGH Complexity :3+ comorbidities / personal factors will impact the outcome/ POC  LOW Complexity : 1-2 Standardized tests and measures addressing body structure, function, activity limitation and / or participation in recreation  MEDIUM Complexity : Evolving with changing characteristics  LOW Complexity : FOTO score of       Based on the above components, the patient evaluation is determined to be of the following complexity level: LOW     Pain:  Pain Scale 1: Numeric (0 - 10)  Pain Intensity 1: 0              Activity Tolerance:   Refer to assessment above  Please refer to the flowsheet for vital signs taken during this treatment. After treatment:   [x]         Patient left in no apparent distress sitting up in chair  []         Patient left in no apparent distress in bed  [x]         Call bell left within reach  [x]         Nursing notified  [x]         Caregiver present  []         Bed alarm activated    COMMUNICATION/EDUCATION:   The patients plan of care was discussed with: Registered Nurse. [x]         Fall prevention education was provided and the patient/caregiver indicated understanding. [x]         Patient/family have participated as able in goal setting and plan of care. [x]         Patient/family agree to work toward stated goals and plan of care. []         Patient understands intent and goals of therapy, but is neutral about his/her participation. []         Patient is unable to participate in goal setting and plan of care.     Thank you for this referral.  Dario Song   Time Calculation: 31 mins

## 2018-05-18 NOTE — PROGRESS NOTES
Spiritual Care Assessment/Progress Note  [facility]      NAME: Jude Bailon      MRN: [id number]  AGE: 81 y.o.  SEX: female  Alevism Affiliation: Adventist   Language: English     5/18/2018     Total Time (in minutes): 15     Spiritual Assessment begun in [facility] IMC[facility] through conversation with:         [x]Patient        [] Family    [] Friend(s)        Reason for Consult: Advance medical directive consult     Spiritual beliefs: (Please include comment if needed)     [] Identifies with a karel tradition:     [] Supported by a karel community:      [] Claims no spiritual orientation:      [] Seeking spiritual identity:           [x] Adheres to an individual form of spirituality:      [] Not able to assess:                     Identified resources for coping:      [] Prayer                               [] Music                  [] Guided Imagery     [x] Family/friends                 [] Pet visits     [] Devotional reading                         [] Unknown     [] Other:                                              Interventions offered during this visit: (See comments for more details)    Patient Interventions: Advance medical directive consult, Affirmation of emotions/emotional suffering, Catharsis/review of pertinent events in supportive environment, Iconic (affirming the presence of God/Higher Power), Normalization of emotional/spiritual concerns, Reframing           Plan of Care:     [] Support spiritual and/or cultural needs    [] Support AMD and/or advance care planning process      [] Support grieving process   [] Coordinate Rites and/or Rituals    [] Coordination with community clergy   [x] No spiritual needs identified at this time   [] Detailed Plan of Care below (See Comments)  [] Make referral to Music Therapy  [] Make referral to Pet Therapy     [] Make referral to Addiction services  [] Make referral to [facility]  [] Make referral to Spiritual Care Partner  [] No future visits requested [] Follow up visits as needed     Comments: Paged by Nurse Henrietta Lopez to provide advanced medical directive consult for Ms. Jez Mims. Arrived to find her alone in room. She welcomed visit and went on to explain that she was not interested in an AMD, but desired information on naming a financial power of . Her primary concern was that her bills be paid while she was hospitalized. She ultimately determined that her brother is best suited for that role and that she will begin that process upon discharge. No immediate spiritual needs. [facility]'s Staff  (Brandi 5 Patient Care Specialist)   Paging Service [phone])

## 2018-05-18 NOTE — PROGRESS NOTES
Lovenox Monitoring  Indication: DVT Prophylaxis  Recent Labs      05/17/18   1528   HGB  12.7   PLT  167   CREA  1.71*     Current Weight: 61.8 kg  Est. CrCl = 21 ml/min  Current Dose: 40 mg subcutaneously every 24 hours. Plan: Change to 30 mg subcutaneously every 24 hours.

## 2018-05-18 NOTE — CONSULTS
Cardiology Consult Note      Patient Name: Julian Castro  : 1935 MRN: [id number]  Date: 2018  Time: 12:46 PM    Admit Diagnosis: CHF exacerbation [facility])    Primary Cardiologist: Marievl Dumont. Jessica Camarillo M.D. Consulting Cardiologist: Marivel Dumont. Jessica Camarillo M.D.    Poncho  for Consult: CHF    Requesting MD: Estephania Christensen MD    HPI:  Julian Castro is a 81 y.o. female admitted on 2018  for CHF exacerbation ([facility] [address] has a past medical history of MORGAN (acute kidney injury) ([facility] [address] [facility] (3/25/2018); Arthritis; Chronic obstructive pulmonary disease ([facility] [facility] (2017); Cor pulmonale ([facility] [address] (2017); Diabetes ([facility] [address] Elevated brain natriuretic peptide (BNP) level (2017); Essential hypertension (2017); Shortness of breath; and Thyroid disease. Presented to ED from Endocrinologist office for worsening SOB. Leg edema and weight gain. H/o COPD and combined systolic/diastolic HF, RV dysfunction/failure. She is on O2, mixed stories of oxygen tank 1/2 full or empty PTA. Echo pending, started on diuresis on admission    Subjective:  She denies CP or palpations. She feels SOB is better and edema of legs is gone. Assessment and Plan     1. Combined diastolic/systolic CHF   - Continue diuresis, will adjust to home dosing   - Follow up echo results today   - Continue on Coreg, but ACE/ARB on hold with CKD  2. COPD   - stable, but will need concentrator at home   - Duonebs   - Add guaifenesin   3. Dilated RV and RV failure with moderate PA HTN   - echo complete, will follow up results  4. CKD   - Cr baseline at 1.4-1.7  5. DM   - per primary team    She has improved since admission. Diuresis has improved SOB and leg swelling. Will adjust meds to PTA dosing and most likely can discharge home tomorrow. CM for concentrator at home. Needs no further cardiac testing at this time.     Cor pulmonale  2017-   ECHO 2017 RV enlargement lower RVEF with normal LVEF 55-60%  ABIs 2017 [facility]  NUKE 7-13-17 =Lexiscan pharmacologic stress test shows normal perfusion with an EF of 64 %. Admit   ---Echo 2/7/18 EF 45-50% with mild LVH, dilated RV with moderate RV failure, PA pressure of 56. creatine 1.64, pt was discharged on 2/9/18 with pulmonary edema mixed diastolic/systolic dysfunction and acute kidney injury. -dehydration from diarrhea she had a foot ulcer with cellulitis   --carotid dopplers with minimal disease. Review of Systems:     GENERAL   Recent weight loss - no   Fever -----------------   no   Chills -----------------   no     EYES, VISION   Visual Changes - no     EARS, NOSE, THROAT   Hearing loss ----------- no   Swallowing difficulties - no     CARDIOVASCULAR   Chest pain/pressure ---- no   Arrhythmia/palpitations - no       RESPIRATORY   Cough ------------------ no   Shortness of breath - yes   Wheezing -------------- no   GASTROINTESTINAL   Abdominal pain - no   Heartburn -------- no   Bloody stool ----- no     GENITOURINARY   Frequent urination - no   Urgency -------------- no     MUSCULOSKELETAL   Joint pain/swelling ---- no   Musculoskeletal pain - no     SKIN & INTEGUMENTARY   Rashes - no   Sores --- no         NEUROLOGICAL   Numbness/tingling - no   Sensation loss ------ no     PSYCHIATRIC   Nervousness/anxiety - no   Depression -------------- no     ENDOCRINE   Heat/cold intolerance - no   Excessive thirst -------- no     HEMATOLOGIC/LYMPHATIC   Abnormal bleeding - no     ALL/IMMUN   Allergic reaction ------ no   Recurrent infections - no     Previous treatment/evaluation includes echocardiogram and persantine thallium .   Cardiac risk factors: diabetes mellitus, obesity, sedentary life style, post-menopausal.    Past Medical History:   Diagnosis Date    MORGAN (acute kidney injury) ([facility] 3/25/2018    Arthritis     Chronic obstructive pulmonary disease ([facility] 7/23/2017    Cor pulmonale (HCC) 8/29/2017    Diabetes ([address]     Elevated brain natriuretic peptide (BNP) level 7/23/2017    Essential hypertension 7/23/2017    Shortness of breath     Thyroid disease      Past Surgical History:   Procedure Laterality Date    HX CATARACT REMOVAL Bilateral      Current Facility-Administered Medications   Medication Dose Route Frequency    albuterol-ipratropium (DUO-NEB) 2.5 MG-0.5 MG/3 ML  3 mL Nebulization BID RT    potassium chloride 10 mEq in 50 ml IVPB  10 mEq IntraVENous Q1H    potassium chloride SR (KLOR-CON 10) tablet 40 mEq  40 mEq Oral BID    aspirin delayed-release tablet 81 mg  81 mg Oral DAILY    carvedilol (COREG) tablet 6.25 mg  6.25 mg Oral BID WITH MEALS    digoxin (LANOXIN) tablet 0.125 mg  0.125 mg Oral Q M, W, F & SAT    insulin glargine (LANTUS) injection 10 Units  10 Units SubCUTAneous BID    levothyroxine (SYNTHROID) tablet 100 mcg  100 mcg Oral Once per day on Mon Tue Wed Thu Fri Sat    [START ON 5/20/2018] levothyroxine (SYNTHROID) tablet 200 mcg  200 mcg Oral every Sunday    furosemide (LASIX) injection 80 mg  80 mg IntraVENous BID    sodium chloride (NS) flush 5-10 mL  5-10 mL IntraVENous Q8H    sodium chloride (NS) flush 5-10 mL  5-10 mL IntraVENous PRN    naloxone (NARCAN) injection 0.4 mg  0.4 mg IntraVENous PRN    zolpidem (AMBIEN) tablet 5 mg  5 mg Oral QHS PRN    enoxaparin (LOVENOX) injection 30 mg  30 mg SubCUTAneous Q24H    acetaminophen (TYLENOL) tablet 650 mg  650 mg Oral Q4H PRN    ondansetron (ZOFRAN) injection 4 mg  4 mg IntraVENous Q4H PRN    glucose chewable tablet 16 g  4 Tab Oral PRN    dextrose (D50W) injection syrg 12.5-25 g  12.5-25 g IntraVENous PRN    glucagon (GLUCAGEN) injection 1 mg  1 mg IntraMUSCular PRN    insulin lispro (HUMALOG) injection   SubCUTAneous AC&HS       Allergies   Allergen Reactions    Other Medication Rash     Auromycin  Chlorocetin      Family History   Problem Relation Age of Onset    Diabetes Brother       Social History     Social History    Marital status: SINGLE     Spouse name: N/A    Number of children: N/A    Years of education: N/A     Social History Main Topics    Smoking status: Former Smoker     Quit date: 2000    Smokeless tobacco: Never Used    Alcohol use No    Drug use: No    Sexual activity: Not Asked     Other Topics Concern    None     Social History Narrative       Objective:    Physical Exam    Vitals:   Vitals:    05/18/18 0814 05/18/18 1131 05/18/18 1145 05/18/18 1200   BP:  180/83     Pulse:  88     Resp:  20     Temp:  97.9 °F (36.6 °C)     SpO2: 94% 90% (!) 87% 93%   Weight:       Height:           General:    Alert, cooperative, no distress, appears stated age. Neck:   Supple, no carotid bruit and no JVD. Back:     Symmetric, normal curvature. Lungs:     Diminished with light crackles in bases to auscultation bilaterally. Heart[de-identified]    Regular rate and rhythm, S1, S2 normal, no murmur, click, rub or gallop. Abdomen:     Soft, non-tender. Bowel sounds normal.    Extremities:   Extremities normal, atraumatic, no cyanosis. Trace edema. Vascular:   Pulses - 2+ radials   Skin:   Skin color normal. No rashes or lesions   Neurologic:   CN II-XII grossly intact.         Telemetry: normal sinus rhythm    ECG:   EKG Results     Procedure 720 Value Units Date/Time    SCANNED CARDIAC RHYTHM STRIP [[id number]] Collected:  05/18/18 0633    Order Status:  Completed Updated:  05/18/18 0643    EKG, 12 LEAD, INITIAL [[id number]] Collected:  05/17/18 1522    Order Status:  Completed Updated:  05/18/18 0520     Ventricular Rate 87 BPM      Atrial Rate 87 BPM      P-R Interval 150 ms      QRS Duration 100 ms      Q-T Interval 388 ms      QTC Calculation (Bezet) 466 ms      Calculated P Axis 60 degrees      Calculated R Axis 156 degrees      Calculated T Axis 14 degrees      Diagnosis --     Normal sinus rhythm  Right ventricular hypertrophy  Septal infarct , age undetermined  When compared with ECG of 06-AUG-2001 11:19,    QRS axis shifted right  Septal infarct is now present  Inverted T waves have replaced nonspecific T wave abnormality in Anterior   leads  Confirmed by Mj Levy M.D., Eastonritesh Ambrocio ([id number]) on 5/18/2018 5:20:09 AM            Data Review:     Radiology:   XR Results (most recent):    Results from [place] encounter on 05/17/18   XR CHEST PA LAT   Narrative EXAM:  XR CHEST PA LAT    INDICATION:  Shortness of breath and leg edema. COMPARISON: None    TECHNIQUE: PA and lateral chest views    FINDINGS: Cardiac monitoring wires overlie the thorax. There is mild cardiac  silhouette enlargement. There are mild diffuse interstitial opacities. There are trace pleural  effusions. There is no pneumothorax. The visualized bones and upper abdomen are  age-appropriate. Impression IMPRESSION:    Trace pleural effusions and mild diffuse interstitial opacities suggesting  pulmonary edema. Recent Labs      05/18/18   0400  05/17/18   1528   TROIQ  <0.04  <0.04     Recent Labs      05/18/18   0400  05/17/18   1528   NA  142  135*   K  3.4*  4.6   CL  104  99   CO2  30  24   BUN  29*  32*   CREA  1.46*  1.71*   GLU  60*  458*   PHOS  3.6   --    CA  8.5  8.5     Recent Labs      05/18/18   0400  05/17/18   1528   WBC  12.2*  8.6   HGB  12.7  12.7   HCT  39.0  40.0   PLT  189  167     Recent Labs      05/18/18   0400  05/17/18   1528   SGOT  13*  20   AP  84  99     No results for input(s): CHOL, LDLC in the last 72 hours. No lab exists for component: TGL, HDLC,  HBA1C  No results for input(s): CRP, TSH, TSHEXT in the last 72 hours. No lab exists for component: ESR    Ricky Lively. Dianaline Sam Elissa Morillo M.D.          [facility] [address] [place] [address] Rd[address] [facility] [facility] [place] [address]     [phone]    Edd Lombard, MD

## 2018-05-19 NOTE — PROGRESS NOTES
Problem: Falls - Risk of  Goal: *Absence of Falls  Document Cathy Fall Risk and appropriate interventions in the flowsheet.    Outcome: Progressing Towards Goal  Fall Risk Interventions:  Mobility Interventions: Patient to call before getting OOB         Medication Interventions: Patient to call before getting OOB    Elimination Interventions: Call light in reach

## 2018-05-19 NOTE — PROGRESS NOTES
Problem: Heart Failure: Day 2  Goal: Activity/Safety  Outcome: Progressing Towards Goal  Call bell within reach  Patient will ambulate to BR using walker and with assistance  Patient to call for assistance  Patient will sit in chair  Goal: Nutrition/Diet  Outcome: Progressing Towards Goal  Encourage patient to eat meals  Offer assistance with meals  Document percentage of meals eaten  Goal: Medications  Outcome: Progressing Towards Goal  Administer heart failure medications as ordered  Goal: Respiratory  Outcome: Progressing Towards Goal  Assess patients respiratory status  Assess patients respiratory rate, oxygen saturation, breath sounds, breathing pattern  Monitor for changes in patients respiratory status  Administer oxygen as ordered  Wean patient to 2L NC (baseline)  Goal: *Oxygen saturation within defined limits  Outcome: Progressing Towards Goal  Assess patients vital signs as ordered  Monitor for changes in patients vital signs  Goal: *Anxiety reduced or absent  Outcome: Progressing Towards Goal  Maintain calm environment for patient    Problem: Falls - Risk of  Goal: *Absence of Falls  Document Cathy Fall Risk and appropriate interventions in the flowsheet. Outcome: Progressing Towards Goal  Fall Risk Interventions:  Mobility Interventions: Communicate number of staff needed for ambulation/transfer, Patient to call before getting OOB, PT Consult for mobility concerns         Medication Interventions: Evaluate medications/consider consulting pharmacy, Patient to call before getting OOB, Teach patient to arise slowly    Elimination Interventions: Call light in reach, Patient to call for help with toileting needs, Toileting schedule/hourly rounds             1930-Bedside and Verbal shift change report given to Leslie RN (oncoming nurse) by Naheed Tinsley RN (offgoing nurse). Report included the following information SBAR, Kardex, Intake/Output, MAR and Recent Results.

## 2018-05-19 NOTE — PROGRESS NOTES
Problem: Heart Failure: Day 3  Goal: Activity/Safety  Outcome: Progressing Towards Goal  Increasing activity by taking breaks between activities

## 2018-05-19 NOTE — PROGRESS NOTES
Hospitalist Progress Note  Francisco Hollis MD  Answering service: [phone] OR 36 from in house phone        Date of Service:  2018  NAME:  Kat Wilkinson  :  1935  MRN:  [id number]      Admission Summary: This is an 81-year-old white female with history of chronic respiratory failure due to COPD on home O2, cor pulmonale, right-sided heart failure, diabetes, CKD and hypothyroidism. Patient feels increasing shortness of breath last several days and also noticed increasing both leg swelling. She noticed 15-20 pounds weight gaining over the last several months and today patient feels extremely short of breath, not able to ambulate due to this, so he came to the ED. Today, the patient noticed more hypoxia in the ED, required increased O2 requirement. Interval history / Subjective: My breathing is improved and leg swelling also improved  I feel dizzy getting up from bed    Assessment & Plan:     1. Acute on chronic hypoxic respiratory failure. - probably due to congestive heart failure exacerbation on top of her chronic obstructive pulmonary disease. In the setting of volume overload cont supportive care with o2/ nebs     2. Acute on chronic biventricular congestive heart failure exacerbation NYHA 2-3  - - ECHO - Systolic function was normal. Ejection fraction was estimated in the range of 55 % to 60 %. There were no regional wall motion abnormalities. - Chest x-ray also noticed interstitial edema and with high BNP   - pt was on digoxin abd BB ( ran out of digoxin before hospitalization ) not on ACE for CKD 4   - cont lasix 80 mg IV q. 12 hours. Watch renal function     3. Possible chronic kidney disease stage IV,   - Cr 1.7   - We will follow her creatinine when on diuretics. - May get  medical records from his PCP to find out his baseline creatinine and BUN. 4.  Diabetes, uncontrolled.   We will continue Lantus and sliding scale. 5.  Hypertension. Continue carvedilol. We will temporarily hold lisinopril due to the level of the renal concern. 6.  Hypothyroidism. We will continue Synthroid. 7.  Chronic obstructive pulmonary disease. We will continue neb treatment. At this moment, there is no exacerbation. 8. Hypokalemia- resolved    Code status: DNR  DVT prophylaxis: SCD    Care Plan discussed with: Patient/Family and Nurse  Disposition: TBD     Hospital Problems  Date Reviewed: 3/25/2018          Codes Class Noted POA    * (Principal)CHF exacerbation ([facility] [address] ICD-10-CM: I50.9  ICD-9-CM: 428.0  5/17/2018 Yes                Review of Systems:   A comprehensive review of systems was negative. Vital Signs:    Last 24hrs VS reviewed since prior progress note. Most recent are:  Visit Vitals    /51 (BP 1 Location: Right arm, BP Patient Position: Sitting)    Pulse 77    Temp 98.1 °F (36.7 °C)    Resp 20    Ht 5' (1.524 m)    Wt 61 kg (134 lb 7.7 oz)    SpO2 96%    BMI 26.26 kg/m2         Intake/Output Summary (Last 24 hours) at 05/19/18 1019  Last data filed at 05/18/18 1646   Gross per 24 hour   Intake              100 ml   Output                0 ml   Net              100 ml        Physical Examination:             Constitutional:  No acute distress, cooperative   ENT:  Oral mucous moist   Resp:  CTA bilaterally. CV:  Regular rhythm, normal rate,     GI:  Soft, non distended, non tender. bs+    Musculoskeletal:  No edema, warm, 2+ pulses throughout    Neurologic:  Moves all extremities. AAOx3, CN II-XII reviewed     Psych:  Good insight, Not anxious nor agitated.        Data Review:    I personally reviewed  Image and labs      Labs:     Recent Labs      05/19/18   0409  05/18/18   0400   WBC  9.9  12.2*   HGB  12.6  12.7   HCT  39.2  39.0   PLT  178  189     Recent Labs      05/19/18   0409  05/18/18   0400  05/17/18   1528   NA  136  142  135*   K  4.8  3.4*  4.6   CL  101  104  99   CO2  27 30  24   BUN  39*  29*  32*   CREA  1.71*  1.46*  1.71*   GLU  169*  60*  458*   CA  8.4*  8.5  8.5   MG   --   2.2   --    PHOS   --   3.6   --      Recent Labs      05/18/18   0400  05/17/18   1528   SGOT  13*  20   ALT  19  24   AP  84  99   TBILI  0.3  0.3   TP  6.5  7.0   ALB  2.8*  2.9*   GLOB  3.7  4.1*   LPSE   --   178     No results for input(s): INR, PTP, APTT in the last 72 hours. No lab exists for component: INREXT, INREXT   No results for input(s): FE, TIBC, PSAT, FERR in the last 72 hours. No results found for: FOL, RBCF   No results for input(s): PH, PCO2, PO2 in the last 72 hours.   Recent Labs      05/18/18   0400  05/17/18   1528   TROIQ  <0.04  <0.04     No results found for: CHOL, CHOLX, CHLST, CHOLV, HDL, LDL, LDLC, DLDLP, TGLX, TRIGL, TRIGP, CHHD, CHHDX  Lab Results   Component Value Date/Time    Glucose (POC) 151 (H) 05/19/2018 06:49 AM    Glucose (POC) 225 (H) 05/18/2018 09:14 PM    Glucose (POC) 247 (H) 05/18/2018 04:40 PM    Glucose (POC) 304 (H) 05/18/2018 11:30 AM    Glucose (POC) 184 (H) 05/18/2018 06:44 AM     Lab Results   Component Value Date/Time    Color YELLOW/STRAW 05/17/2018 03:43 PM    Appearance CLEAR 05/17/2018 03:43 PM    Specific gravity 1.013 05/17/2018 03:43 PM    pH (UA) 6.5 05/17/2018 03:43 PM    Protein 100 (A) 05/17/2018 03:43 PM    Glucose >1000 (A) 05/17/2018 03:43 PM    Ketone NEGATIVE  05/17/2018 03:43 PM    Bilirubin NEGATIVE  05/17/2018 03:43 PM    Urobilinogen 0.2 05/17/2018 03:43 PM    Nitrites NEGATIVE  05/17/2018 03:43 PM    Leukocyte Esterase NEGATIVE  05/17/2018 03:43 PM    Epithelial cells FEW 05/17/2018 03:43 PM    Bacteria NEGATIVE  05/17/2018 03:43 PM    WBC 0-4 05/17/2018 03:43 PM    RBC 5-10 05/17/2018 03:43 PM         Medications Reviewed:     Current Facility-Administered Medications   Medication Dose Route Frequency    albuterol-ipratropium (DUO-NEB) 2.5 MG-0.5 MG/3 ML  3 mL Nebulization BID RT    bumetanide (BUMEX) tablet 2 mg  2 mg Oral DAILY    aspirin delayed-release tablet 81 mg  81 mg Oral DAILY    carvedilol (COREG) tablet 6.25 mg  6.25 mg Oral BID WITH MEALS    digoxin (LANOXIN) tablet 0.125 mg  0.125 mg Oral Q M, W, F & SAT    insulin glargine (LANTUS) injection 10 Units  10 Units SubCUTAneous BID    levothyroxine (SYNTHROID) tablet 100 mcg  100 mcg Oral Once per day on Mon Tue Wed Thu Fri Sat    [START ON 5/20/2018] levothyroxine (SYNTHROID) tablet 200 mcg  200 mcg Oral every Sunday    sodium chloride (NS) flush 5-10 mL  5-10 mL IntraVENous Q8H    sodium chloride (NS) flush 5-10 mL  5-10 mL IntraVENous PRN    naloxone (NARCAN) injection 0.4 mg  0.4 mg IntraVENous PRN    zolpidem (AMBIEN) tablet 5 mg  5 mg Oral QHS PRN    enoxaparin (LOVENOX) injection 30 mg  30 mg SubCUTAneous Q24H    acetaminophen (TYLENOL) tablet 650 mg  650 mg Oral Q4H PRN    ondansetron (ZOFRAN) injection 4 mg  4 mg IntraVENous Q4H PRN    glucose chewable tablet 16 g  4 Tab Oral PRN    dextrose (D50W) injection syrg 12.5-25 g  12.5-25 g IntraVENous PRN    glucagon (GLUCAGEN) injection 1 mg  1 mg IntraMUSCular PRN    insulin lispro (HUMALOG) injection   SubCUTAneous AC&HS     ______________________________________________________________________  EXPECTED LENGTH OF STAY: 4d 12h  ACTUAL LENGTH OF STAY:          2                 Jeanine Srivastava MD

## 2018-05-19 NOTE — PROGRESS NOTES
Cardiology Progress Note         5/19/2018 12:07 PM    Admit Date: 5/17/2018    Admit Diagnosis: CHF exacerbation ([facility]      Subjective:     Matilde Lockett is seen this weekend for Dr. Axel Stewart.  She has cor pulmonale and chronic kidney disease  She denies shortness of breath orthopnea PND    Past Medical History:   Diagnosis Date    [facility]GAN [facility]) ([facility] 3/25/2018    Arthritis     Chronic obstructive pulmonary disease ([facility] 7/23/2017    Cor pulmonale ([facility] 8/29/2017    Diabetes ([facility]     Elevated brain natriuretic peptide (BNP) level 7/23/2017    Essential hypertension 7/23/2017    Shortness of breath     Thyroid disease      Past Surgical History:   Procedure Laterality Date    HX CATARACT REMOVAL Bilateral      Social History     Social History    Marital status: SINGLE     Spouse name: N/A    Number of children: N/A    Years of education: N/A     Occupational History    Not on file.      Social History Main Topics    Smoking status: Former Smoker     Quit date: 2000    Smokeless tobacco: Never Used    Alcohol use No    Drug use: No    Sexual activity: Not on file     Other Topics Concern    Not on file     Social History Narrative         Visit Vitals    /47 (BP 1 Location: Left arm, BP Patient Position: Lying right side)    Pulse 83    Temp (!) 69 °F (20.6 °C)    Resp 16    Ht 5' (1.524 m)    Wt 134 lb 7.7 oz (61 kg)    SpO2 98%    BMI 26.26 kg/m2     Current Facility-Administered Medications   Medication Dose Route Frequency    albuterol-ipratropium (DUO-NEB) 2.5 MG-0.5 MG/3 ML  3 mL Nebulization BID RT    bumetanide (BUMEX) tablet 2 mg  2 mg Oral DAILY    aspirin delayed-release tablet 81 mg  81 mg Oral DAILY    carvedilol (COREG) tablet 6.25 mg  6.25 mg Oral BID WITH MEALS    digoxin (LANOXIN) tablet 0.125 mg  0.125 mg Oral Q M, W, F & SAT    insulin glargine (LANTUS) injection 10 Units  10 Units SubCUTAneous BID    levothyroxine (SYNTHROID) tablet 100 mcg 100 mcg Oral Once per day on Mon Tue Wed Thu Fri Sat    [START ON 5/20/2018] levothyroxine (SYNTHROID) tablet 200 mcg  200 mcg Oral every Sunday    sodium chloride (NS) flush 5-10 mL  5-10 mL IntraVENous Q8H    sodium chloride (NS) flush 5-10 mL  5-10 mL IntraVENous PRN    naloxone (NARCAN) injection 0.4 mg  0.4 mg IntraVENous PRN    zolpidem (AMBIEN) tablet 5 mg  5 mg Oral QHS PRN    enoxaparin (LOVENOX) injection 30 mg  30 mg SubCUTAneous Q24H    acetaminophen (TYLENOL) tablet 650 mg  650 mg Oral Q4H PRN    ondansetron (ZOFRAN) injection 4 mg  4 mg IntraVENous Q4H PRN    glucose chewable tablet 16 g  4 Tab Oral PRN    dextrose (D50W) injection syrg 12.5-25 g  12.5-25 g IntraVENous PRN    glucagon (GLUCAGEN) injection 1 mg  1 mg IntraMUSCular PRN    insulin lispro (HUMALOG) injection   SubCUTAneous AC&HS         Objective:      Physical Exam:  Visit Vitals    /47 (BP 1 Location: Left arm, BP Patient Position: Lying right side)    Pulse 83    Temp (!) 69 °F (20.6 °C)    Resp 16    Ht 5' (1.524 m)    Wt 134 lb 7.7 oz (61 kg)    SpO2 98%    BMI 26.26 kg/m2     General Appearance:  Well developed, well nourished,alert and oriented x 3, and individual in no acute distress. Ears/Nose/Mouth/Throat:   Hearing grossly normal.         Neck: Supple. Chest:   Lungs clear to auscultation bilaterally. Cardiovascular:  Regular rate and rhythm, 2/6 RSB systolic murmur. Abdomen:   Soft    Extremities: + edema bilaterally. Skin: Warm and dry.                Data Review:   Labs:    Recent Results (from the past 24 hour(s))   GLUCOSE, POC    Collection Time: 05/18/18  4:40 PM   Result Value Ref Range    Glucose (POC) 247 (H) 65 - 100 mg/dL    Performed by Zoya Ramsey, POC    Collection Time: 05/18/18  9:14 PM   Result Value Ref Range    Glucose (POC) 225 (H) 65 - 100 mg/dL    Performed by [facility]    CBC WITH AUTOMATED DIFF    Collection Time: 05/19/18  4:09 AM   Result Value Ref Range    WBC 9.9 3.6 - 11.0 K/uL    RBC 3.97 3.80 - 5.20 M/uL    HGB 12.6 11.5 - 16.0 g/dL    HCT 39.2 35.0 - 47.0 %    MCV 98.7 80.0 - 99.0 FL    MCH 31.7 26.0 - 34.0 PG    MCHC 32.1 30.0 - 36.5 g/dL    RDW 17.1 (H) 11.5 - 14.5 %    PLATELET 330 310 - 158 K/uL    MPV 11.1 8.9 - 12.9 FL    NRBC 0.0 0  WBC    ABSOLUTE NRBC 0.00 0.00 - 0.01 K/uL    NEUTROPHILS 67 32 - 75 %    LYMPHOCYTES 23 12 - 49 %    MONOCYTES 7 5 - 13 %    EOSINOPHILS 3 0 - 7 %    BASOPHILS 0 0 - 1 %    IMMATURE GRANULOCYTES 1 (H) 0.0 - 0.5 %    ABS. NEUTROPHILS 6.6 1.8 - 8.0 K/UL    ABS. LYMPHOCYTES 2.2 0.8 - 3.5 K/UL    ABS. MONOCYTES 0.7 0.0 - 1.0 K/UL    ABS. EOSINOPHILS 0.3 0.0 - 0.4 K/UL    ABS. BASOPHILS 0.0 0.0 - 0.1 K/UL    ABS. IMM. GRANS. 0.1 (H) 0.00 - 0.04 K/UL    DF AUTOMATED     METABOLIC PANEL, BASIC    Collection Time: 05/19/18  4:09 AM   Result Value Ref Range    Sodium 136 136 - 145 mmol/L    Potassium 4.8 3.5 - 5.1 mmol/L    Chloride 101 97 - 108 mmol/L    CO2 27 21 - 32 mmol/L    Anion gap 8 5 - 15 mmol/L    Glucose 169 (H) 65 - 100 mg/dL    BUN 39 (H) 6 - 20 MG/DL    Creatinine 1.71 (H) 0.55 - 1.02 MG/DL    BUN/Creatinine ratio 23 (H) 12 - 20      GFR est AA 35 (L) >60 ml/min/1.73m2    GFR est non-AA 29 (L) >60 ml/min/1.73m2    Calcium 8.4 (L) 8.5 - 10.1 MG/DL   GLUCOSE, POC    Collection Time: 05/19/18  6:49 AM   Result Value Ref Range    Glucose (POC) 151 (H) 65 - 100 mg/dL    Performed by Jaron Wells    GLUCOSE, POC    Collection Time: 05/19/18 11:29 AM   Result Value Ref Range    Glucose (POC) 174 (H) 65 - 100 mg/dL    Performed by Ej COOPER        Telemetry: normal sinus rhythm  There were substantial artifacts      Assessment:     Principal Problem:  CHF exacerbation ([facility] (5/17/2018), right-sided heart failure  Cor pulmonale  Chronic kidney disease  History of hypertension  History of diabetes mellitus  Plan:   Blood pressure is not high enough to increase medication doses.   We will continue with her current Bumex Coreg and digoxin. Due to kidney failure, I will check the digoxin level  Net urine out 800 cc and weight down by 1 llb  Trent Butt M.D.  [facility]  Electrophysiology/Cardiology  [facility] [place]  [address] [facility], [address]  [phone]

## 2018-05-20 NOTE — PROGRESS NOTES
Cardiology Progress Note         5/20/2018 12:07 PM    Admit Date: 5/17/2018    Admit Diagnosis: [facility]F exacerbation ([facility]      Subjective:     Blair Lyon is seen this weekend for Dr. Lauralee Aschoff.  She has cor pulmonale and chronic kidney disease  She denies shortness of breath orthopnea PND  She is currently on oxygen and she does not like it. The patient inquired about skilled nursing facility placement    Past Medical History:   Diagnosis Date    MORGAN (acute kidney injury) ([facility] 3/25/2018    Arthritis     Chronic obstructive pulmonary disease ([facility] 7/23/2017    Cor pulmonale ([facility] 8/29/2017    Diabetes ([facility]     Elevated brain natriuretic peptide (BNP) level 7/23/2017    Essential hypertension 7/23/2017    Shortness of breath     Thyroid disease      Past Surgical History:   Procedure Laterality Date    HX CATARACT REMOVAL Bilateral      Social History     Social History    Marital status: SINGLE     Spouse name: N/A    Number of children: N/A    Years of education: N/A     Occupational History    Not on file.      Social History Main Topics    Smoking status: Former Smoker     Quit date: 2000    Smokeless tobacco: Never Used    Alcohol use No    Drug use: No    Sexual activity: Not on file     Other Topics Concern    Not on file     Social History Narrative         Visit Vitals    /55 (BP 1 Location: Left arm, BP Patient Position: At rest;Sitting)    Pulse 91    Temp 97.6 °F (36.4 °C)    Resp 20    Ht 5' (1.524 m)    Wt 134 lb 0.6 oz (60.8 kg)    SpO2 95%    BMI 26.18 kg/m2     Current Facility-Administered Medications   Medication Dose Route Frequency    insulin lispro (HUMALOG) injection   SubCUTAneous AC&HS    glucose chewable tablet 16 g  4 Tab Oral PRN    dextrose (D50W) injection syrg 12.5-25 g  12.5-25 g IntraVENous PRN    glucagon (GLUCAGEN) injection 1 mg  1 mg IntraMUSCular PRN    albuterol-ipratropium (DUO-NEB) 2.5 MG-0.5 MG/3 ML  3 mL Nebulization BID RT    bumetanide (BUMEX) tablet 2 mg  2 mg Oral DAILY    aspirin delayed-release tablet 81 mg  81 mg Oral DAILY    carvedilol (COREG) tablet 6.25 mg  6.25 mg Oral BID WITH MEALS    digoxin (LANOXIN) tablet 0.125 mg  0.125 mg Oral Q M, W, F & SAT    insulin glargine (LANTUS) injection 10 Units  10 Units SubCUTAneous BID    levothyroxine (SYNTHROID) tablet 100 mcg  100 mcg Oral Once per day on Mon Tue Wed Thu Fri Sat    levothyroxine (SYNTHROID) tablet 200 mcg  200 mcg Oral every Sunday    sodium chloride (NS) flush 5-10 mL  5-10 mL IntraVENous Q8H    sodium chloride (NS) flush 5-10 mL  5-10 mL IntraVENous PRN    naloxone (NARCAN) injection 0.4 mg  0.4 mg IntraVENous PRN    zolpidem (AMBIEN) tablet 5 mg  5 mg Oral QHS PRN    enoxaparin (LOVENOX) injection 30 mg  30 mg SubCUTAneous Q24H    acetaminophen (TYLENOL) tablet 650 mg  650 mg Oral Q4H PRN    ondansetron (ZOFRAN) injection 4 mg  4 mg IntraVENous Q4H PRN         Objective:      Physical Exam:  Visit Vitals    /55 (BP 1 Location: Left arm, BP Patient Position: At rest;Sitting)    Pulse 91    Temp 97.6 °F (36.4 °C)    Resp 20    Ht 5' (1.524 m)    Wt 134 lb 0.6 oz (60.8 kg)    SpO2 95%    BMI 26.18 kg/m2     General Appearance:  Well developed, well nourished,alert and oriented x 3, and individual in no acute distress. Ears/Nose/Mouth/Throat:   Hearing grossly normal.         Neck: Supple. Chest:   Lungs clear to auscultation bilaterally. Cardiovascular:  Regular rate and rhythm, 2/6 RSB systolic murmur. Abdomen:   Soft    Extremities: + 1 edema bilaterally. Skin: Warm and dry.                Data Review:   Labs:    Recent Results (from the past 24 hour(s))   GLUCOSE, POC    Collection Time: 05/19/18 11:29 AM   Result Value Ref Range    Glucose (POC) 174 (H) 65 - 100 mg/dL    Performed by Kasey COOPER    GLUCOSE, POC    Collection Time: 05/19/18  4:40 PM   Result Value Ref Range    Glucose (POC) 229 (H) 65 - 100 mg/dL Performed by [facility]    GLUCOSE, POC    Collection Time: 05/19/18  9:14 PM   Result Value Ref Range    Glucose (POC) 145 (H) 65 - 100 mg/dL    Performed by Daryll Mortimer    DIGOXIN    Collection Time: 05/20/18  3:17 AM   Result Value Ref Range    Digoxin level 0.7 (L) 0.9 - 2.0 NG/ML   GLUCOSE, POC    Collection Time: 05/20/18  6:26 AM   Result Value Ref Range    Glucose (POC) 64 (L) 65 - 100 mg/dL    Performed by Bishnu Hunter, POC    Collection Time: 05/20/18  6:36 AM   Result Value Ref Range    Glucose (POC) 63 (L) 65 - 100 mg/dL    Performed by [address], POC    Collection Time: 05/20/18  6:55 AM   Result Value Ref Range    Glucose (POC) 127 (H) 65 - 100 mg/dL    Performed by [address], POC    Collection Time: 05/20/18  9:02 AM   Result Value Ref Range    Glucose (POC) 230 (H) 65 - 100 mg/dL    Performed by Melissa ANN        Telemetry: normal sinus rhythm  There were substantial artifacts      Assessment:     Principal Problem:  CHF exacerbation ([address] (5/17/2018), right-sided heart failure  Cor pulmonale  Chronic kidney disease  History of hypertension  History of diabetes mellitus  Plan:   Blood pressure is slightly higher this morning. I will increase carvedilol 12.5 mg twice daily  We will continue with her current Bumex Coreg and digoxin. Due to kidney failure, I check the digoxin level and the level of 0.7 so we can continue with the current dose  I do not have the net urine output today yet but it weight is stable at 134 pounds from yesterday. We will continue with the same dose of Gagan Nava M.D.  [facility]  Electrophysiology/Cardiology  [address] [facility] [place]  [address] , [facility] [address]  [address]  [phone]

## 2018-05-20 NOTE — PROGRESS NOTES
Problem: Falls - Risk of  Goal: *Absence of Falls  Document Cathy Fall Risk and appropriate interventions in the flowsheet. Outcome: Progressing Towards Goal  Fall Risk Interventions:  Mobility Interventions: Communicate number of staff needed for ambulation/transfer, Patient to call before getting OOB, PT Consult for mobility concerns         Medication Interventions: Evaluate medications/consider consulting pharmacy, Patient to call before getting OOB, Teach patient to arise slowly    Elimination Interventions: Call light in reach, Patient to call for help with toileting needs, Toileting schedule/hourly rounds     Patient will be free of falls during shift    History of Falls Interventions: Door open when patient unattended, Evaluate medications/consider consulting pharmacy, Investigate reason for fall, Room close to nurse's station        Problem: Heart Failure: Day 3  Goal: Diagnostic Test/Procedures  Outcome: Resolved/Met Date Met: 05/20/18  Draw labs as ordered  Monitor labs  Goal: Medications  Outcome: Resolved/Met Date Met: 05/20/18  Administer heart failure medications as ordered  Goal: Respiratory  Outcome: Resolved/Met Date Met: 05/20/18  Assess patients respiratory status  Assess respiratory rate, oxygen saturation, breathing pattern, breath sounds  Administer oxygen as ordered/needed  Patient will tolerate baseline oxygen 2L NC  Monitor for changes in patients respiratory status  Goal: *Anxiety reduced or absent  Outcome: Resolved/Met Date Met: 05/20/18  Maintain calm environment for patient        1515-Bedside and Verbal shift change report given to Zhang Ferrell RN (oncoming nurse) by Jose Blakely RN (offgoing nurse). Report included the following information SBAR, Kardex, Intake/Output, MAR and Recent Results.

## 2018-05-20 NOTE — PROGRESS NOTES
Patient's sister arrived and became argumentative with staff over the release of patient information. Patient at this time, does not consent to her sister receiving any information. Sister is asking for the patient's cardiologist's name, mental status, patient ambulatory status. Sister is concerned that patient needs to go to a SNF instead of home due to her perceived inability to care for herself.  When informed that the patient does not want information released

## 2018-05-20 NOTE — PROGRESS NOTES
Hospitalist Progress Note  Carole Lawson MD  Answering service: [phone] from in house phone        Date of Service:  2018  NAME:  Julian Castro  :  1935  MRN:  [id number]      Admission Summary: This is an 71-year-old white female with history of chronic respiratory failure due to COPD on home O2, cor pulmonale, right-sided heart failure, diabetes, CKD and hypothyroidism. Patient feels increasing shortness of breath last several days and also noticed increasing both leg swelling. She noticed 15-20 pounds weight gaining over the last several months and today patient feels extremely short of breath, not able to ambulate due to this, so he came to the ED. Today, the patient noticed more hypoxia in the ED, required increased O2 requirement. Interval history / Subjective:      Blood pressure better , may readjust medication dose   Level of digoxin low    Assessment & Plan:     1. Acute on chronic hypoxic respiratory failure. - probably due to congestive heart failure exacerbation on top of her chronic obstructive pulmonary disease. In the setting of volume overload cont supportive care with o2/ nebs     2. Acute on chronic biventricular congestive heart failure exacerbation NYHA 2-3  -  ECHO - Systolic function was normal. Ejection fraction was estimated in the range of 55 % to 60 %. There were no regional wall motion abnormalities. - Chest x-ray also noticed interstitial edema and with high BNP   - pt was on digoxin abd BB ( ran out of digoxin before hospitalization ) not on ACE for CKD 4   - cont lasix 80 mg IV q. 12 hours. Watch renal function repeat labs  - digoxin level low will repeat tomorrow      3. Possible chronic kidney disease stage IV,   - Cr 1.7   - We will follow her creatinine when on diuretics. - repeat labs today    4. Diabetes, uncontrolled. We will continue Lantus and sliding scale.  A1C 9.5  She is taking lantus 10 units BID and SSI given age a close monitoring required has a tendency to go low on BS    5. Hypertension. Continue carvedilol. We will temporarily hold lisinopril due to the level of the renal concern. If still high today may add a alternate second agent     6. Hypothyroidism. We will continue Synthroid. 7.  Chronic obstructive pulmonary disease. We will continue neb treatment. At this time, there is no exacerbation. 8. Hypokalemia- resolved    Code status: DNR  DVT prophylaxis: SCD    Care Plan discussed with: Patient/Family and Nurse  Disposition: TBD pt will need rehab on d/c in 1-2 days      Hospital Problems  Date Reviewed: 3/25/2018          Codes Class Noted POA    * (Principal)CHF exacerbation ([facility] ICD-10-CM: I50.9  ICD-9-CM: 428.0  5/17/2018 Yes                Review of Systems:   A comprehensive review of systems was negative. Vital Signs:    Last 24hrs VS reviewed since prior progress note. Most recent are:  Visit Vitals    /55 (BP 1 Location: Left arm, BP Patient Position: At rest;Sitting)    Pulse 91    Temp 97.6 °F (36.4 °C)    Resp 20    Ht 5' (1.524 m)    Wt 60.8 kg (134 lb 0.6 oz)    SpO2 96%    BMI 26.18 kg/m2       No intake or output data in the 24 hours ending 05/20/18 0808     Physical Examination:             Constitutional:  No acute distress, cooperative   ENT:  Oral mucous moist   Resp:  CTA bilaterally. CV:  Regular rhythm, normal rate,     GI:  Soft, non distended, non tender. bs+    Musculoskeletal:  No edema, warm, 2+ pulses throughout    Neurologic:  Moves all extremities. AAOx3, CN II-XII reviewed     Psych:  Good insight, Not anxious nor agitated.        Data Review:    I personally reviewed  Image and labs      Labs:     Recent Labs      05/19/18   0409  05/18/18   0400   WBC  9.9  12.2*   HGB  12.6  12.7   HCT  39.2  39.0   PLT  178  189     Recent Labs      05/19/18   0409  05/18/18   0400  05/17/18   1528   NA 136  142  135*   K  4.8  3.4*  4.6   CL  101  104  99   CO2  27  30  24   BUN  39*  29*  32*   CREA  1.71*  1.46*  1.71*   GLU  169*  60*  458*   CA  8.4*  8.5  8.5   MG   --   2.2   --    PHOS   --   3.6   --      Recent Labs      05/18/18   0400  05/17/18   1528   SGOT  13*  20   ALT  19  24   AP  84  99   TBILI  0.3  0.3   TP  6.5  7.0   ALB  2.8*  2.9*   GLOB  3.7  4.1*   LPSE   --   178     No results for input(s): INR, PTP, APTT in the last 72 hours. No lab exists for component: INREXT, INREXT   No results for input(s): FE, TIBC, PSAT, FERR in the last 72 hours. No results found for: FOL, RBCF   No results for input(s): PH, PCO2, PO2 in the last 72 hours.   Recent Labs      05/18/18   0400  05/17/18   1528   TROIQ  <0.04  <0.04     No results found for: CHOL, CHOLX, CHLST, CHOLV, HDL, LDL, LDLC, DLDLP, TGLX, TRIGL, TRIGP, CHHD, CHHDX  Lab Results   Component Value Date/Time    Glucose (POC) 127 (H) 05/20/2018 06:55 AM    Glucose (POC) 63 (L) 05/20/2018 06:36 AM    Glucose (POC) 64 (L) 05/20/2018 06:26 AM    Glucose (POC) 145 (H) 05/19/2018 09:14 PM    Glucose (POC) 229 (H) 05/19/2018 04:40 PM     Lab Results   Component Value Date/Time    Color YELLOW/STRAW 05/17/2018 03:43 PM    Appearance CLEAR 05/17/2018 03:43 PM    Specific gravity 1.013 05/17/2018 03:43 PM    pH (UA) 6.5 05/17/2018 03:43 PM    Protein 100 (A) 05/17/2018 03:43 PM    Glucose >1000 (A) 05/17/2018 03:43 PM    Ketone NEGATIVE  05/17/2018 03:43 PM    Bilirubin NEGATIVE  05/17/2018 03:43 PM    Urobilinogen 0.2 05/17/2018 03:43 PM    Nitrites NEGATIVE  05/17/2018 03:43 PM    Leukocyte Esterase NEGATIVE  05/17/2018 03:43 PM    Epithelial cells FEW 05/17/2018 03:43 PM    Bacteria NEGATIVE  05/17/2018 03:43 PM    WBC 0-4 05/17/2018 03:43 PM    RBC 5-10 05/17/2018 03:43 PM         Medications Reviewed:     Current Facility-Administered Medications   Medication Dose Route Frequency    albuterol-ipratropium (DUO-NEB) 2.5 MG-0.5 MG/3 ML  3 mL Nebulization BID RT    bumetanide (BUMEX) tablet 2 mg  2 mg Oral DAILY    aspirin delayed-release tablet 81 mg  81 mg Oral DAILY    carvedilol (COREG) tablet 6.25 mg  6.25 mg Oral BID WITH MEALS    digoxin (LANOXIN) tablet 0.125 mg  0.125 mg Oral Q M, W, F & SAT    insulin glargine (LANTUS) injection 10 Units  10 Units SubCUTAneous BID    levothyroxine (SYNTHROID) tablet 100 mcg  100 mcg Oral Once per day on Mon Tue Wed Thu Fri Sat    levothyroxine (SYNTHROID) tablet 200 mcg  200 mcg Oral every Sunday    sodium chloride (NS) flush 5-10 mL  5-10 mL IntraVENous Q8H    sodium chloride (NS) flush 5-10 mL  5-10 mL IntraVENous PRN    naloxone (NARCAN) injection 0.4 mg  0.4 mg IntraVENous PRN    zolpidem (AMBIEN) tablet 5 mg  5 mg Oral QHS PRN    enoxaparin (LOVENOX) injection 30 mg  30 mg SubCUTAneous Q24H    acetaminophen (TYLENOL) tablet 650 mg  650 mg Oral Q4H PRN    ondansetron (ZOFRAN) injection 4 mg  4 mg IntraVENous Q4H PRN    glucose chewable tablet 16 g  4 Tab Oral PRN    dextrose (D50W) injection syrg 12.5-25 g  12.5-25 g IntraVENous PRN    glucagon (GLUCAGEN) injection 1 mg  1 mg IntraMUSCular PRN    insulin lispro (HUMALOG) injection   SubCUTAneous AC&HS     ______________________________________________________________________  EXPECTED LENGTH OF STAY: 4d 12h  ACTUAL LENGTH OF STAY:          3                 Adonay Wren MD

## 2018-05-20 NOTE — PROGRESS NOTES
Problem: Heart Failure: Day 3  Goal: Medications  Outcome: Progressing Towards Goal  Digoxin administered by day shift, levels will be tested Riley, lovenox administered  Goal: Respiratory  Outcome: Progressing Towards Goal  Pt on baseline o2 of 2L nasal canula     1130 pt bp 172/61. shabana jolley paged  1200 shabana jolley repaged  1210 Instructed to take a manual bp by Maciej Landon MD and call back. No orders at this time. 1215 bp 160/62 manual    0626 bg 64. 4oz applejuice given  0636 bg 63 8 oz apple juice and a snack given  0655 bg 127          Bedside and Verbal shift change report given to [address] [place] [address] (oncoming nurse) by Cat RN (offgoing nurse). Report included the following information SBAR.

## 2018-05-21 NOTE — PROGRESS NOTES
Cardiology Progress Note         5/21/2018 12:07 PM    Admit Date: 5/17/2018    Admit Diagnosis: CHF exacerbation ([facility]      Subjective:     Jacqualyn Baumgarten is resting comfortably this morning. Arouses without difficulty. Feels SOB and leg edema is much better. She does c/o feeling dizzy with walking. Also notes generally feeling \"bad\". She goes on to note she started feeling this  We after discharge from [address] when starting her Coreg, Dig and Bumex. She notes this improved when she ran out of her   Dig last week and didn't take it. Now starting her Dig on this admission, she is feeling dizzy and poor. Past Medical History:   Diagnosis Date    MORGAN (acute kidney injury) ([facility] 3/25/2018    Arthritis     Chronic obstructive pulmonary disease ([facility] 7/23/2017    Cor pulmonale (HCC) 8/29/2017    Diabetes ([facility]     Elevated brain natriuretic peptide (BNP) level 7/23/2017    Essential hypertension 7/23/2017    Shortness of breath     Thyroid disease      Past Surgical History:   Procedure Laterality Date    HX CATARACT REMOVAL Bilateral      Social History     Social History    Marital status: SINGLE     Spouse name: N/A    Number of children: N/A    Years of education: N/A     Occupational History    Not on file.      Social History Main Topics    Smoking status: Former Smoker     Quit date: 2000    Smokeless tobacco: Never Used    Alcohol use No    Drug use: No    Sexual activity: Not on file     Other Topics Concern    Not on file     Social History Narrative         Visit Vitals    /73 (BP 1 Location: Left arm, BP Patient Position: At rest;Sitting)    Pulse (P) 78    Temp 95.9 °F (35.5 °C)    Resp 16    Ht 5' (1.524 m)    Wt 133 lb 2.5 oz (60.4 kg)    SpO2 93%    BMI 26.01 kg/m2     Current Facility-Administered Medications   Medication Dose Route Frequency    hydrALAZINE (APRESOLINE) tablet 25 mg  25 mg Oral TID    insulin lispro (HUMALOG) injection   SubCUTAneous AC&HS    glucose chewable tablet 16 g  4 Tab Oral PRN    dextrose (D50W) injection syrg 12.5-25 g  12.5-25 g IntraVENous PRN    glucagon (GLUCAGEN) injection 1 mg  1 mg IntraMUSCular PRN    carvedilol (COREG) tablet 12.5 mg  12.5 mg Oral BID WITH MEALS    albuterol-ipratropium (DUO-NEB) 2.5 MG-0.5 MG/3 ML  3 mL Nebulization BID RT    bumetanide (BUMEX) tablet 2 mg  2 mg Oral DAILY    aspirin delayed-release tablet 81 mg  81 mg Oral DAILY    digoxin (LANOXIN) tablet 0.125 mg  0.125 mg Oral Q M, W, F & SAT    insulin glargine (LANTUS) injection 10 Units  10 Units SubCUTAneous BID    levothyroxine (SYNTHROID) tablet 100 mcg  100 mcg Oral Once per day on Mon Tue Wed Thu Fri Sat    levothyroxine (SYNTHROID) tablet 200 mcg  200 mcg Oral every Sunday    sodium chloride (NS) flush 5-10 mL  5-10 mL IntraVENous Q8H    sodium chloride (NS) flush 5-10 mL  5-10 mL IntraVENous PRN    naloxone (NARCAN) injection 0.4 mg  0.4 mg IntraVENous PRN    zolpidem (AMBIEN) tablet 5 mg  5 mg Oral QHS PRN    enoxaparin (LOVENOX) injection 30 mg  30 mg SubCUTAneous Q24H    acetaminophen (TYLENOL) tablet 650 mg  650 mg Oral Q4H PRN    ondansetron (ZOFRAN) injection 4 mg  4 mg IntraVENous Q4H PRN         Objective:      Physical Exam:  Visit Vitals    /73 (BP 1 Location: Left arm, BP Patient Position: At rest;Sitting)    Pulse (P) 78    Temp 95.9 °F (35.5 °C)    Resp 16    Ht 5' (1.524 m)    Wt 133 lb 2.5 oz (60.4 kg)    SpO2 93%    BMI 26.01 kg/m2     General Appearance:  Well developed, well nourished,alert and oriented x 3, and individual in no acute distress. Ears/Nose/Mouth/Throat:   Hearing grossly normal.         Neck: Supple. Chest:   Lungs with scant crackles in bases to auscultation bilaterally. Cardiovascular:  Regular rate and rhythm, 2/6 RSB systolic murmur. Abdomen:   Soft    Extremities: + 1 edema bilaterally. Skin: Warm and dry.                Data Review:   Labs:    Recent Results (from the past 24 hour(s))   GLUCOSE, POC    Collection Time: 05/20/18 11:34 AM   Result Value Ref Range    Glucose (POC) 301 (H) 65 - 100 mg/dL    Performed by Dojohnson Spaphay P    GLUCOSE, POC    Collection Time: 05/20/18  4:27 PM   Result Value Ref Range    Glucose (POC) 104 (H) 65 - 100 mg/dL    Performed by Dojohnson Spaphay P    GLUCOSE, POC    Collection Time: 05/20/18  9:42 PM   Result Value Ref Range    Glucose (POC) 127 (H) 65 - 100 mg/dL    Performed by [address] [place] [address] Malathi, BASIC    Collection Time: 05/21/18  3:25 AM   Result Value Ref Range    Sodium 138 136 - 145 mmol/L    Potassium 4.2 3.5 - 5.1 mmol/L    Chloride 100 97 - 108 mmol/L    CO2 30 21 - 32 mmol/L    Anion gap 8 5 - 15 mmol/L    Glucose 89 65 - 100 mg/dL    BUN 44 (H) 6 - 20 MG/DL    Creatinine 1.78 (H) 0.55 - 1.02 MG/DL    BUN/Creatinine ratio 25 (H) 12 - 20      GFR est AA 33 (L) >60 ml/min/1.73m2    GFR est non-AA 27 (L) >60 ml/min/1.73m2    Calcium 8.5 8.5 - 10.1 MG/DL   GLUCOSE, POC    Collection Time: 05/21/18  6:32 AM   Result Value Ref Range    Glucose (POC) 127 (H) 65 - 100 mg/dL    Performed by Pop Tran        Telemetry: normal sinus rhythm  There were substantial artifacts      Assessment:     Principal Problem:  CHF exacerbation ([address] (5/17/2018), right-sided heart failure  Cor pulmonale  Chronic kidney disease  History of hypertension  History of diabetes mellitus    Plan: Bp still up. Hydralazine started by Primary team. Omer Pages now 12.5 mg BID. Continue Bumex 2 mg PO daily. May need to consider stopping Digoxin as this may be causing her dizziness and feeling somewhat poor. Dr. Fernando Phi to see today. Will check TSH.         ROHAN Allen MD    [facility]  [address] , [address]  [place], [address] [place] [address]  [phone]

## 2018-05-21 NOTE — PROGRESS NOTES
CM noted transfer to . CM met with patient and she confirmed that she lives alone in her condo at [facility]. Her sister, Leela Hensley [phone],  assists with grocery shopping. She has home 02 (portable tanks) provided by [employer]. (see CM assessment 5/17/18)      Patient said she is in agreement with rehab when discharged. CM and patient discussed options-- She has been to [address] [place] [address] in the past and felt she received good rehab. She asked that referrals be sent to [address] [place] [address] and [place] [phone]. CM sent referral to [address] [place] [address] via [employer] and [employer] CC link. Patient has Medicare and no authorization will be needed for admission. CM will continue to follow and assist with transition of care plan  Snf.     UPDATE  4:45 pm    [facility] [facility]  --Still waiting for [employer] to respond.

## 2018-05-21 NOTE — PROGRESS NOTES
Hospitalist Progress Note  Jay Lee MD  Answering service: [phone] from in house phone        Date of Service:  2018  NAME:  Dexter Grayson  :  1935  MRN:  [id number]      Admission Summary: This is an 81-year-old white female with history of chronic respiratory failure due to COPD on home O2, cor pulmonale, right-sided heart failure, diabetes, CKD and hypothyroidism. Patient feels increasing shortness of breath last several days and also noticed increasing both leg swelling. She noticed 15-20 pounds weight gaining over the last several months and today patient feels extremely short of breath, not able to ambulate due to this, so he came to the ED. Today, the patient noticed more hypoxia in the ED, required increased O2 requirement. Interval history / Subjective:      BP still high , may readjust medication dose   Level of digoxin low , no acute complains    Assessment & Plan:     1. Acute on chronic hypoxic respiratory failure. - probably due to congestive heart failure exacerbation on top of her chronic obstructive pulmonary disease. In the setting of volume overload cont supportive care with o2/ nebs     2. Acute on chronic biventricular congestive heart failure exacerbation NYHA 2-3  -  ECHO - Systolic function was normal. Ejection fraction was estimated in the range of 55 % to 60 %. There were no regional wall motion abnormalities. - Chest x-ray also noticed interstitial edema and with high BNP   - pt was on digoxin abd BB dose increased  ( ran out of digoxin before hospitalization ) not on ACE for CKD 4   - cont lasix 80 mg IV q. 12 hours. Watch renal function repeat labs  ( weight same to yesterday )   - digoxin level low ( need lab? )      3. Possible chronic kidney disease stage IV,   - Cr 1.7   - We will follow her creatinine when on diuretics. 4.  Diabetes, uncontrolled.   We will continue Lantus and sliding scale. A1C 9.5  She is taking lantus 10 units BID and SSI given age a close monitoring required has a tendency to go low on BS  - she said she does not do any SSI  At home possibly that's why BS went low    5. Hypertension. Continue carvedilol. We will temporarily hold lisinopril due to the level of the renal concern. If still high today may add a alternate second agent   - will add hydralazine today    6. Hypothyroidism. We will continue Synthroid. 7.  Chronic obstructive pulmonary disease. We will continue neb treatment. At this time, there is no exacerbation. 8. Hypokalemia- resolved    Code status: DNR  DVT prophylaxis: SCD    Care Plan discussed with: Patient/Family and Nurse  Disposition: TBD pt will need rehab on d/c in 1-2 days      Hospital Problems  Date Reviewed: 3/25/2018          Codes Class Noted POA    * (Principal)CHF exacerbation ([facility] ICD-10-CM: I50.9  ICD-9-CM: 428.0  5/17/2018 Yes                Review of Systems:   A comprehensive review of systems was negative. Vital Signs:    Last 24hrs VS reviewed since prior progress note. Most recent are:  Visit Vitals    /73 (BP 1 Location: Left arm, BP Patient Position: At rest;Sitting)    Pulse 79    Temp 95.9 °F (35.5 °C)    Resp 16    Ht 5' (1.524 m)    Wt 60.4 kg (133 lb 2.5 oz)    SpO2 93%    BMI 26.01 kg/m2         Intake/Output Summary (Last 24 hours) at 05/21/18 8048  Last data filed at 05/20/18 1527   Gross per 24 hour   Intake                0 ml   Output              400 ml   Net             -400 ml        Physical Examination:             Constitutional:  No acute distress, cooperative   ENT:  Oral mucous moist   Resp:  CTA bilaterally. CV:  Regular rhythm, normal rate,     GI:  Soft, non distended, non tender. bs+    Musculoskeletal:  No edema, warm, 2+ pulses throughout    Neurologic:  Moves all extremities. AAOx3, CN II-XII reviewed     Psych:  Good insight, Not anxious nor agitated. Data Review:    I personally reviewed  Image and labs      Labs:     Recent Labs      05/19/18   0409   WBC  9.9   HGB  12.6   HCT  39.2   PLT  178     Recent Labs      05/21/18   0325  05/20/18   1119  05/19/18   0409   NA  138  136  136   K  4.2  4.7  4.8   CL  100  99  101   CO2  30  28  27   BUN  44*  39*  39*   CREA  1.78*  1.73*  1.71*   GLU  89  287*  169*   CA  8.5  8.1*  8.4*     No results for input(s): SGOT, GPT, ALT, AP, TBIL, TBILI, TP, ALB, GLOB, GGT, AML, LPSE in the last 72 hours. No lab exists for component: AMYP, HLPSE  No results for input(s): INR, PTP, APTT in the last 72 hours. No lab exists for component: INREXT, INREXT   No results for input(s): FE, TIBC, PSAT, FERR in the last 72 hours. No results found for: FOL, RBCF   No results for input(s): PH, PCO2, PO2 in the last 72 hours. No results for input(s): CPK, CKNDX, TROIQ in the last 72 hours.     No lab exists for component: CPKMB  No results found for: CHOL, CHOLX, CHLST, CHOLV, HDL, LDL, LDLC, DLDLP, TGLX, TRIGL, TRIGP, CHHD, CHHDX  Lab Results   Component Value Date/Time    Glucose (POC) 127 (H) 05/21/2018 06:32 AM    Glucose (POC) 127 (H) 05/20/2018 09:42 PM    Glucose (POC) 104 (H) 05/20/2018 04:27 PM    Glucose (POC) 301 (H) 05/20/2018 11:34 AM    Glucose (POC) 230 (H) 05/20/2018 09:02 AM     Lab Results   Component Value Date/Time    Color YELLOW/STRAW 05/17/2018 03:43 PM    Appearance CLEAR 05/17/2018 03:43 PM    Specific gravity 1.013 05/17/2018 03:43 PM    pH (UA) 6.5 05/17/2018 03:43 PM    Protein 100 (A) 05/17/2018 03:43 PM    Glucose >1000 (A) 05/17/2018 03:43 PM    Ketone NEGATIVE  05/17/2018 03:43 PM    Bilirubin NEGATIVE  05/17/2018 03:43 PM    Urobilinogen 0.2 05/17/2018 03:43 PM    Nitrites NEGATIVE  05/17/2018 03:43 PM    Leukocyte Esterase NEGATIVE  05/17/2018 03:43 PM    Epithelial cells FEW 05/17/2018 03:43 PM    Bacteria NEGATIVE  05/17/2018 03:43 PM    WBC 0-4 05/17/2018 03:43 PM    RBC 5-10 05/17/2018 03:43 PM         Medications Reviewed:     Current Facility-Administered Medications   Medication Dose Route Frequency    insulin lispro (HUMALOG) injection   SubCUTAneous AC&HS    glucose chewable tablet 16 g  4 Tab Oral PRN    dextrose (D50W) injection syrg 12.5-25 g  12.5-25 g IntraVENous PRN    glucagon (GLUCAGEN) injection 1 mg  1 mg IntraMUSCular PRN    carvedilol (COREG) tablet 12.5 mg  12.5 mg Oral BID WITH MEALS    albuterol-ipratropium (DUO-NEB) 2.5 MG-0.5 MG/3 ML  3 mL Nebulization BID RT    bumetanide (BUMEX) tablet 2 mg  2 mg Oral DAILY    aspirin delayed-release tablet 81 mg  81 mg Oral DAILY    digoxin (LANOXIN) tablet 0.125 mg  0.125 mg Oral Q M, W, F & SAT    insulin glargine (LANTUS) injection 10 Units  10 Units SubCUTAneous BID    levothyroxine (SYNTHROID) tablet 100 mcg  100 mcg Oral Once per day on Mon Tue Wed Thu Fri Sat    levothyroxine (SYNTHROID) tablet 200 mcg  200 mcg Oral every Sunday    sodium chloride (NS) flush 5-10 mL  5-10 mL IntraVENous Q8H    sodium chloride (NS) flush 5-10 mL  5-10 mL IntraVENous PRN    naloxone (NARCAN) injection 0.4 mg  0.4 mg IntraVENous PRN    zolpidem (AMBIEN) tablet 5 mg  5 mg Oral QHS PRN    enoxaparin (LOVENOX) injection 30 mg  30 mg SubCUTAneous Q24H    acetaminophen (TYLENOL) tablet 650 mg  650 mg Oral Q4H PRN    ondansetron (ZOFRAN) injection 4 mg  4 mg IntraVENous Q4H PRN     ______________________________________________________________________  EXPECTED LENGTH OF STAY: 4d 12h  ACTUAL LENGTH OF STAY:          4                 Adonay Wren MD

## 2018-05-21 NOTE — PROGRESS NOTES
Problem: Diabetes Self-Management  Goal: *Monitoring blood glucose, interpreting and using results  Identify recommended blood glucose targets  and personal targets. Outcome: Progressing Towards Goal  bg monitored ACHS and coverage administered due to sliding scale Humalog. No nighttime insulin coverage needed    Problem: Falls - Risk of  Goal: *Absence of Falls  Document Cathy Fall Risk and appropriate interventions in the flowsheet. Outcome: Progressing Towards Goal  Fall Risk Interventions:  Mobility Interventions: Communicate number of staff needed for ambulation/transfer, Assess mobility with egress test, Patient to call before getting OOB         Medication Interventions: Assess postural VS orthostatic hypotension, Patient to call before getting OOB, Evaluate medications/consider consulting pharmacy, Teach patient to arise slowly    Elimination Interventions: Call light in reach, Patient to call for help with toileting needs, Toilet paper/wipes in reach, Toileting schedule/hourly rounds    History of Falls Interventions: Bed/chair exit alarm, Door open when patient unattended, Investigate reason for fall, Evaluate medications/consider consulting pharmacy, Room close to nurse's station    Pt remains free of falls while in the hospital.  Call bell and frequently used items within reach. Pt provided with skid free socks and instructed to call out for assistance ambulating. Frequent purposeful rounding initiated by staff. Bed in low and locked position with 3 siderails up. Pt room near nurses station, door left open when unattended with supplemental lighting left on. Pt uses rollator to bathroom. Bedside and Verbal shift change report given to [facility] (oncoming nurse) by Cat RN (offgoing nurse). Report included the following information SBAR.

## 2018-05-21 NOTE — PROGRESS NOTES
Bedside and Verbal shift change report given to Abimael Mckeon RN (oncoming nurse) by [facility], RN (offgoing nurse). Report included the following information SBAR, Kardex, Intake/Output, MAR and Recent Results.

## 2018-05-21 NOTE — PROGRESS NOTES
Problem: Mobility Impaired (Adult and Pediatric)  Goal: *Acute Goals and Plan of Care (Insert Text)  Physical Therapy Goals  Initiated 5/18/2018  1. Patient will move from supine to sit and sit to supine , scoot up and down and roll side to side in bed with independence within 7 day(s). 2.  Patient will transfer from bed to chair and chair to bed with modified independence using the least restrictive device within 7 day(s). 3.  Patient will perform sit to stand with modified independence within 7 day(s). 4.  Patient will ambulate with modified independence for 300 feet with the least restrictive device within 7 day(s). 5.  Patient will ascend/descend 12 stairs with one handrail(s) with modified independence within 7 day(s). physical Therapy TREATMENT  Patient: Fabiola Turk (54 y.o. female)  Date: 5/21/2018  Diagnosis: CHF exacerbation ([facility] CHF exacerbation ([facility]       Precautions: Fall  Chart, physical therapy assessment, plan of care and goals were reviewed. ASSESSMENT:  Pt presents with overall decreased activity tolerance secondary to declining O2 sats with minimal activity on baseline 2L O2. Pt received in supine, /55, agreeable to therapy though stated she had been dizzy all morning pt stating that she thinks it's due to digoxin medication. Discussed pt's reported dizziness with pt's nurse. Pt transferred supine to sit with supervision, /67, sit to stand with supervision /54, and stand to sit in chair next to bed, where O2 sat dropped to 80%. With pursed lip breathing, O2 sat recovered to 95% in approximately 2 minutes. Pt then stood with supervision and ambulated to bathroom. Sitting on toilet, pt O2 sat dropped again to 80% and rebounded with pursed lip breathing to 93% before ambulating with CGA back to chair. In sitting post-activity, /62, HR 77. All activities performed on 2L O2.   Given pt's drop in O2 sats with minimal activity, did not perform 6MWT today, though I recommend a six minute walk test per heart failure protocol. . Given lack of progress in term of activity tolerance, I now recommend short term rehab at time of discharge (pt lives alone and has 12 steps to enter her home), unless she makes significant progess. .   Progression toward goals:  []    Improving appropriately and progressing toward goals  [x]    Improving slowly and progressing toward goals  []    Not making progress toward goals and plan of care will be adjusted     PLAN:  Patient continues to benefit from skilled intervention to address the above impairments. Continue treatment per established plan of care. Discharge Recommendations:  SNF for Rehab (preferred), HHPT if enough gains made   Further Equipment Recommendations for Discharge:  none     SUBJECTIVE:   Patient stated I maybe didn't tell them I wasn't feeling good\" about previous rehab facility, when asked to voice any dizziness with activity. OBJECTIVE DATA SUMMARY:   Chart checked, pt cleared by nursing  Critical Behavior:  Neurologic State: Alert  Orientation Level: Oriented X4  Cognition: Appropriate decision making, Appropriate for age attention/concentration, Appropriate safety awareness, Follows commands  Safety/Judgement: Awareness of environment, Fall prevention  Functional Mobility Training:  Bed Mobility:     Supine to Sit: Supervision              Transfers:  Sit to Stand: Supervision  Stand to Sit: Supervision                             Balance:  Sitting: Intact; Without support  Standing: Intact; With support  Ambulation/Gait Training:  Distance (ft): 30 Feet (ft), 15 feet X 2  Assistive Device: Gait belt;Walker, rollator (Rollator only for 1st 15 ft)  Ambulation - Level of Assistance: Contact guard assistance        Gait Abnormalities: Decreased step clearance        Base of Support: Narrowed     Speed/Jaida: Slow;Pace decreased (<100 feet/min)  Step Length: Left shortened;Right lengthened Stairs:              Neuro Re-Education:    Therapeutic Exercises:     Pain:  Pain Scale 1: Numeric (0 - 10)  Pain Intensity 1: 0              Activity Tolerance:   Decreased, see assessment above. Please refer to the flowsheet for vital signs taken during this treatment.   After treatment:   [x]    Patient left in no apparent distress sitting up in chair  []    Patient left in no apparent distress in bed  [x]    Call bell left within reach  [x]    Nursing notified  []    Caregiver present  []    Bed alarm activated    COMMUNICATION/COLLABORATION:   The patients plan of care was discussed with: Registered Nurse    Dario Song, PT  Liyah Holman, MARISSA   Time Calculation: 40 mins

## 2018-05-21 NOTE — PROGRESS NOTES
Patient seen at bedside with PT. PT has recommended SNF for rehab at discharge. Patient states she has been to [address] [place] [address]. Will give patient a list to review and make selections for referrals.     Jr Mejias RN CRM  Ext [phone]

## 2018-05-21 NOTE — PROGRESS NOTES
TRANSFER - IN REPORT:    Verbal report received from [address], RN(name) on Blair Lyon  being received from [facility]) for routine progression of care      Report consisted of patients Situation, Background, Assessment and   Recommendations(SBAR). Information from the following report(s) SBAR, Kardex, [facility] and Recent Results was reviewed with the receiving nurse. Opportunity for questions and clarification was provided. Assessment completed upon patients arrival to unit and care assumed.

## 2018-05-21 NOTE — DIABETES MGMT
DTC Progress Note    Recommendations/ Comments:  Chart reviewed on Darian Clarke for hyperglycemia, mostly during the day. If appropriate, please consider:  - adding Glimepiride 1 mg if patient eating at least 50 %     Current hospital DM medication: Lantus 10 units BID, Lispro correction, high sensitivity    Patient is a 81 y.o. female with a history of diabetes on insulin injections: Lantus : 10 units BID at home. A1c:   Lab Results   Component Value Date/Time    Hemoglobin A1c 9.5 (H) 05/18/2018 04:00 AM    Hemoglobin A1c, External 6.4 10/05/2017    Hemoglobin A1c, External 6.8 06/22/2017       Recent Glucose Results:   Lab Results   Component Value Date/Time    GLU 89 05/21/2018 03:25 AM    GLUCPOC 257 (H) 05/21/2018 11:55 AM    GLUCPOC 127 (H) 05/21/2018 06:32 AM    GLUCPOC 127 (H) 05/20/2018 09:42 PM        Lab Results   Component Value Date/Time    Creatinine 1.78 (H) 05/21/2018 03:25 AM     Estimated Creatinine Clearance: 20.2 mL/min (based on Cr of 1.78). Active Orders   Diet    DIET DIABETIC CONSISTENT CARB Regular        PO intake:   Patient Vitals for the past 72 hrs:   % Diet Eaten   05/20/18 1238 100 %   05/20/18 0855 75 %   05/19/18 1816 100 %   05/19/18 1320 75 %   05/19/18 0902 100 %       Will continue to follow as needed.     Thank you  Preeti Babin, MS, RN, CDE

## 2018-05-22 NOTE — DISCHARGE SUMMARY
Discharge Summary     Patient:  Lex Mora       MRN: [id number]       YOB: 1935       Age: 81 y.o. Date of admission:  5/17/2018    Date of discharge:  5/22/2018    Primary care provider: Dr. Darling Miguel MD    Admitting provider:  Melchor Boeck, MD    Discharging provider:  Lary Malave MD - [phone]  If unavailable, call [phone] and ask the  to page the triage hospitalist.    Consultations  · ED CONSULT TO SENIOR SERVICES CASE MANAGEMENT  · IP CONSULT TO CARDIOLOGY  · IP CONSULT TO HOSPITALIST    Procedures  · * No surgery found *    Discharge destination: SNF. The patient is stable for discharge. Admission diagnosis  · CHF exacerbation ([facility]    Current Discharge Medication List      START taking these medications    Details   hydrALAZINE (APRESOLINE) 25 mg tablet Take 1 Tab by mouth three (3) times daily. Qty: 90 Tab, Refills: 0         CONTINUE these medications which have NOT CHANGED    Details   insulin glargine (LANTUS U-100 INSULIN) 100 unit/mL injection 10 Units by SubCUTAneous route two (2) times a day. !! levothyroxine (SYNTHROID) 100 mcg tablet Take 100 mcg by mouth six (6) days a week. Monday through Saturday      !! levothyroxine (SYNTHROID) 100 mcg tablet Take 200 mcg by mouth every Sunday. vit A/vit C/vit E/zinc/copper (PRESERVISION AREDS PO) Take 1 Tab by mouth daily. carvedilol (COREG) 6.25 mg tablet Take  by mouth two (2) times daily (with meals). bumetanide (BUMEX) 2 mg tablet Take 2 mg by mouth daily. aspirin delayed-release 81 mg tablet Take  by mouth daily. !! - Potential duplicate medications found. Please discuss with provider.       STOP taking these medications       lisinopril (PRINIVIL, ZESTRIL) 5 mg tablet Comments:   Reason for Stopping:         digoxin (LANOXIN) 0.125 mg tablet Comments:   Reason for Stopping: Follow-up Information     Follow up With Details Comments 605 Brian Servin MD In 2 weeks Discharge follow up  [address] [facility]      Lynne Villa MD On 5/24/2018 at 1:40 pm on 5/24/18 [address] [facility]   Esau Pandey [address]  [phone]            Final discharge diagnoses and brief hospital course  Please also refer to the admission H&P for details on the presenting problem. 64-year-old white female with history of chronic respiratory failure due to COPD on home O2, cor pulmonale, right-sided heart failure, diabetes, CKD and hypothyroidism.  Patient feels increasing shortness of breath last several days and also noticed increasing both leg swelling.  She noticed 15-20 pounds weight gaining over the last several months and today patient feels extremely short of breath, not able to ambulate due to this, so he came to the ED. Patti Pedro, the patient noticed more hypoxia in the ED, required increased O2 requirement. 1.  Acute on chronic hypoxic respiratory failure.    - probably due to congestive heart failure exacerbation on top of her chronic obstructive pulmonary disease. In the setting of volume overload cont supportive care with o2/ nebs      2.  Acute on chronic diastolic congestive heart failure exacerbation NYHA 2-3  -  ECHO - Systolic function was normal. Ejection fraction was estimated in the range of 55 % to 60 %. There were no regional wall motion abnormalities. - Systolic CHF have resolved  - Chest x-ray also noticed interstitial edema and with high BNP   - c/w BB and Hydralazine  - c/w home Bumex      3.  Possible chronic kidney disease stage IV,   - Cr 1.7   - We will follow her creatinine when on diuretics.       4.  Diabetes, uncontrolled.  We will continue Lantus and sliding scale.  A1C 9.5  She is taking lantus 10 units BID      5.  Hypertension.  Continue carvedilol, added hydralazine     6.  Hypothyroidism.  We will continue Synthroid.     7.  Chronic obstructive pulmonary disease.  We will continue neb treatment.  At this time, there is no exacerbation.     8. Hypokalemia- resolved    FOLLOW-UP CARE RECOMMENDATIONS:    APPOINTMENTS:  Follow-up Information     Follow up With Details Comments Contact Info    Trino Spear MD In 2 weeks Discharge follow up  [address] [facility]      Carleen Mckeon MD On 5/24/2018 at 1:40 pm on 5/24/18 [facility] 14 [facility]   Esau [address]  [phone]              It is very important that you keep follow-up appointment(s). Bring discharge papers, medication list (and/or medication bottles) to follow-up appointments for review by outpatient provider(s). FOLLOW-UP TESTS RECOMMENDED:   · Daily wts, Fluid restrictions to 1200cc  · Continue Oxygen 2L, on chronic O2    ONGOING TREATMENT PLAN: see above    PENDING TEST RESULTS:  At the time of discharge the following test results are still pending: none. Please review these results as they become available. Specific symptoms to watch for: chest pain, shortness of breath, fever, chills, nausea, vomiting, diarrhea, change in mentation, falling, weakness, bleeding.     DIET:  Cardiac Diet and Diabetic Diet    ACTIVITY:  Activity as tolerated    WOUND CARE: NONE    EQUIPMENT needed:  NONE    INCIDENTAL FINDINGS:  NONE    GOALS OF CARE:  X  Eventual return to home/independent/assisted living     Long term SNF      Hospice     No rehospitalization     Patient condition at discharge:   Functional status    Poor    X  Deconditioned      Independent   Cognition  X  Lucid     Forgetful (some sensescence)     Dementia   Catheters/lines (plus indication)    Leigh     PICC      PEG         Code status Full code    X  DNR        Physical examination at discharge  Visit Vitals    /55 (BP 1 Location: Right arm, BP Patient Position: At rest)    Pulse 68    Temp 97.6 °F (36.4 °C)    Resp 16    Ht 5' (1.524 m)    Wt 64 kg (141 lb 1.5 oz)    SpO2 91%    BMI 27.56 kg/m2     AO3  PERRLA  EOMI  Lung: Clear, mild rales on Left base  CVS: RRR  Abd: soft NT ND  Ext: no edema    Pertinent imaging studies:    ECHO:    SUMMARY:  Left ventricle: Systolic function was normal. Ejection fraction was estimated in the range of 55 % to 60 %. There were no regional wall motion abnormalities. Right ventricle: The ventricle was moderately dilated. Systolic function was mildly reduced. Tricuspid valve: There was mild regurgitation. Insufficient tricuspid regurgitation to estimate pulmonary artery pressure. No results for input(s): WBC, HGB, HCT, PLT, HGBEXT, HCTEXT, PLTEXT in the last 72 hours. Recent Labs      05/22/18   0105  05/21/18   0325  05/20/18   1119   NA  135*  138  136   K  4.4  4.2  4.7   CL  98  100  99   CO2  29  30  28   BUN  51*  44*  39*   CREA  2.08*  1.78*  1.73*   GLU  150*  89  287*   CA  8.4*  8.5  8.1*     No results for input(s): SGOT, GPT, AP, TBIL, TP, ALB, GLOB, GGT, AML, LPSE in the last 72 hours. No lab exists for component: AMYP, HLPSE  No results for input(s): INR, PTP, APTT in the last 72 hours. No lab exists for component: INREXT   No results for input(s): FE, TIBC, PSAT, FERR in the last 72 hours. No results for input(s): PH, PCO2, PO2 in the last 72 hours. No results for input(s): CPK, CKMB in the last 72 hours.     No lab exists for component: TROPONINI  No components found for: [place]    Chronic Diagnoses:    Problem List as of 5/22/2018  Date Reviewed: 3/25/2018          Codes Class Noted - Resolved    * (Principal)CHF exacerbation ([facility] ICD-10-CM: I50.9  ICD-9-CM: 428.0  5/17/2018 - Present        MORGAN (acute kidney injury) ([facility] ICD-10-CM: N17.9  ICD-9-CM: 584.9 3/25/2018 - Present        Cor pulmonale ([facility] ICD-10-CM: I27.81  ICD-9-CM: 416.9  8/29/2017 - Present        Diabetes ([facility] ICD-10-CM: E11.9  ICD-9-CM: 250.00  Unknown - Present        HAYS (dyspnea on exertion) ICD-10-CM: R06.09  ICD-9-CM: 786.09  7/23/2017 - Present        Chronic obstructive pulmonary disease (HCC) ICD-10-CM: J44.9  ICD-9-CM: 496  7/23/2017 - Present        Essential hypertension ICD-10-CM: I10  ICD-9-CM: 401.9  7/23/2017 - Present        Elevated brain natriuretic peptide (BNP) level ICD-10-CM: R79.89  ICD-9-CM: 790.99  7/23/2017 - Present        RESOLVED: Thyroid disease ICD-10-CM: E07.9  ICD-9-CM: 246. 9  Unknown - 3/1/2018              Time spent on discharge related activities today greater than 30 minutes.       Signed:  Milagros Boothe MD                 Hospitalist, Internal Medicine      Cc: Edi Stein MD

## 2018-05-22 NOTE — PROGRESS NOTES
Problem: Falls - Risk of  Goal: *Absence of Falls  Document Cathy Fall Risk and appropriate interventions in the flowsheet.    Outcome: Progressing Towards Goal  Fall Risk Interventions:  Mobility Interventions: Communicate number of staff needed for ambulation/transfer, Patient to call before getting OOB         Medication Interventions: Patient to call before getting OOB, Teach patient to arise slowly    Elimination Interventions: Call light in reach, Patient to call for help with toileting needs    History of Falls Interventions: Door open when patient unattended, Room close to nurse's station

## 2018-05-22 NOTE — DISCHARGE INSTRUCTIONS
Discharging provider: Jennifer Ojeda MD    Primary care provider: Lou Copeland MD    [address] [facility] COURSE:    81-year-old white female with history of chronic respiratory failure due to COPD on home O2, cor pulmonale, right-sided heart failure, diabetes, CKD and hypothyroidism.  Patient feels increasing shortness of breath last several days and also noticed increasing both leg swelling.  She noticed 15-20 pounds weight gaining over the last several months and today patient feels extremely short of breath, not able to ambulate due to this, so he came to the ED. Alexis Kellogg, the patient noticed more hypoxia in the ED, required increased O2 requirement. 1.  Acute on chronic hypoxic respiratory failure.    - probably due to congestive heart failure exacerbation on top of her chronic obstructive pulmonary disease. In the setting of volume overload cont supportive care with o2/ nebs      2.  Acute on chronic diastolic congestive heart failure exacerbation NYHA 2-3  -  ECHO - Systolic function was normal. Ejection fraction was estimated in the range of 55 % to 60 %. There were no regional wall motion abnormalities. - Systolic CHF have resolved  - Chest x-ray also noticed interstitial edema and with high BNP   - c/w BB and Hydralazine  - c/w home Bumex      3.  Possible chronic kidney disease stage IV,   - Cr 1.7   - We will follow her creatinine when on diuretics.       4.  Diabetes, uncontrolled.  We will continue Lantus and sliding scale. A1C 9.5  She is taking lantus 10 units BID      5.  Hypertension.  Continue carvedilol, added hydralazine     6.  Hypothyroidism.  We will continue Synthroid.     7.  Chronic obstructive pulmonary disease.  We will continue neb treatment.  At this time, there is no exacerbation.     8.  Hypokalemia- resolved    FOLLOW-UP CARE RECOMMENDATIONS:    APPOINTMENTS:  Follow-up Information     Follow up With Details Comments Contact Info    Lou Copeland MD In 2 weeks Discharge follow up  [address] [facility]      Luis Alfredo Howard MD On 5/24/2018 at 1:40 pm on 5/24/18 [address] [facility]   Esau Pandey [address] [facility]  [phone]              It is very important that you keep follow-up appointment(s). Bring discharge papers, medication list (and/or medication bottles) to follow-up appointments for review by outpatient provider(s). FOLLOW-UP TESTS RECOMMENDED:   · Daily wts, Fluid restrictions to 1200cc  · Continue Oxygen 2L, on chronic O2    ONGOING TREATMENT PLAN: see above    PENDING TEST RESULTS:  At the time of discharge the following test results are still pending: none. Please review these results as they become available. Specific symptoms to watch for: chest pain, shortness of breath, fever, chills, nausea, vomiting, diarrhea, change in mentation, falling, weakness, bleeding. DIET:  Cardiac Diet and Diabetic Diet    ACTIVITY:  Activity as tolerated    WOUND CARE: NONE    EQUIPMENT needed:  NONE    INCIDENTAL FINDINGS:  NONE    GOALS OF CARE:  X  Eventual return to home/independent/assisted living     Long term SNF      Hospice     No rehospitalization     Patient condition at discharge:   Functional status    Poor    X  Deconditioned      Independent   Cognition  X  Lucid     Forgetful (some sensescence)     Dementia   Catheters/lines (plus indication)    Leigh     PICC      PEG         Code status    Full code    X  DNR    . . . . . . . . . . . . . . . . . . . . . . . . . . . . . . . . . . . . . . . . . . . . . . . . . . . . . . . . . . . . . . . . . . . . . . . Artist Rashaun      CHRONIC MEDICAL CONDITIONS:  Problem List as of 5/22/2018  Date Reviewed: 3/25/2018          Codes Class Noted - Resolved    * (Principal)CHF exacerbation ([facility] ICD-10-CM: I50.9  ICD-9-CM: 428.0  5/17/2018 - Present        MORGAN (acute kidney injury) ([facility] ICD-10-CM: N17.9  ICD-9-CM: 584.9  3/25/2018 - Present        Cor pulmonale ([facility] ICD-10-CM: I27.81  ICD-9-CM: 416.9  8/29/2017 - Present        Diabetes ([facility] ICD-10-CM: E11.9  ICD-9-CM: 250.00  Unknown - Present        HAYS (dyspnea on exertion) ICD-10-CM: R06.09  ICD-9-CM: 786.09  7/23/2017 - Present        Chronic obstructive pulmonary disease (HCC) ICD-10-CM: J44.9  ICD-9-CM: 496  7/23/2017 - Present        Essential hypertension ICD-10-CM: I10  ICD-9-CM: 401.9  7/23/2017 - Present        Elevated brain natriuretic peptide (BNP) level ICD-10-CM: R79.89  ICD-9-CM: 790.99  7/23/2017 - Present        RESOLVED: Thyroid disease ICD-10-CM: E07.9  ICD-9-CM: 246. 9  Unknown - 3/1/2018

## 2018-05-24 NOTE — PROGRESS NOTES
Heart Failure Initial Call    NN contacted the patient by telephone to perform CHF Follow Up. Verified  and address as identifiers. Call to [facility] - [phone] for nurse Michelle Sampson for room 105. Followed by Dr. Jeff Aldana at the facility - NN requested labs for  - BMP, Mg, BNP - and to check on pulmonary tx regimen-  appt moved from 9:10am  - Dr. Aric Gallo to 11am - (pt request). Pt continues on 2 l oxygen continuous. She is on 1200 ml fluid restriction - per discharge -   NN requested daily am weights - and low sodium diet -   Appt moved to  - 11am - per pt request - Dr. Aric Gallo - in the interim - Dr. Jeff Aldana at [facility] -   Cardiology on call  at [phone]. Noted Priorities/Plan:  CHF exacerbation - cor pulmonale/ right heart failure 2nd to lung disease         COPD/neb tx and continuous oxygen at 2 liters         CKD         Hypertension    Daily Weight (document daily weights in flowsheets):   Decrease   Amount:  8 lbs       Provider Notified:   yes   Zone:(Pt Reported)  green      Needs addressed from pathway: (add in the appropriate pathway)   24-48 Hours   Review/Verification:  ? Identify care team  ? Disposition (Home, SNF, IRF LTC, SARAH, Hospice)  ? DC Instructions, Education, and HF Zones  ? Giovanny Girard in place. [facility], Dialysis center)   ? Evaluate DME needs; arrange home equipment  ? Advanced care planning (e.g.: Palliative Care; Hospice  ? Type of monitoring  ? Labs/Diagnostics to follow  ? Follow-up apts are arranged  ? Identify transportation    Med Rec*   ? Ace/Arb,   ? Diuretic,   ? Beta Blocker,   ? Potassium,   ? Anticoagulant   Baseline Labs  ? BMP  ? BUN/Creat.  ? PT/INR  ? Hgb/Hct  ? WBC    TOM Documentation:  ? Goals  ? Challenges/Plan  ? Weight    ? Edema  ? Symptoms    Education Disease Management:  ? Cardiac Low-sodium Diet (No added sodium; 1500mg as indicated). If Diabetic:  include carb-controlled   ? Fluid restriction (if indicated)  ?  Comorbidity Management  ? Daily Weights  ? Advance medical directive status        PCP: Julee Bhardwaj  Cardiologist: Reny Sutton  Specialist: pulmonologist - ? Follow up appointment with Cardiology (1-3 days): d/c to SNF appt 6/11  Follow up appointment with PCP (within 14 days): Dr. Raisa Borrero at [facility]     Cardiologist consult while IP: yes     Palliative consult while IP:    No     EF: 55-60% (result) on 5/18/18 (date)   Type of HF:   HFpEF     Cardiac Device present :none     Heart Failure Medications: Betablocker, Diuretic, Anticoagulat     Disposition of patient:  [address] [facility] referral / rehab - [facility] Services/Tele Monitoring: d/c to SNF    Social support: friends/ family/ lives alone    Do you have a Scale:    yes   How often do you weigh:  q am - at SNF  Does patient have an Advance Directive:  not on file     Advance Directive scanned into patients chart:  No     Patient reminded that there are physicians on call 24 hours a day / 7 days a week (M-F 5pm to 8am and from Friday 5pm until Monday 8a for the weekend) should the patient have questions or concerns. Patient reminded to call 911 if situation is emergent or patient feels the situation is emergent. Pt verbalizes understanding.

## 2018-05-29 NOTE — PROGRESS NOTES
Community Care Team Documentation for Patient in [facility]  Initial Follow Up       Patient was admitted to [address] [place] [address] from 5/17 to 5/22. Patient was discharged to [facility] on 5/22 (date). See previous [facility] Team documentation under Patient Outreach. Hospital Discharge diagnosis: CHF exacerbation    RRAT score:  23    Advance Medical Directive on file in EMR? Not on file    Total Hospitalizations/ED visits last 6 months? IP - 1; ED - 0    PCP : Trino Spear MD    Per Jahaira Klein at [facility], Ensured patient arrived to SNF with admission packet in order. Discussed upcoming cardio follow up 6/11. Confirmed pt is on daily weights and fluid restriction. Weights stable at this time. On O2 - (home O2 through [employer]). Pt is being skilled by PT/OT. Currently ambulating 150ft with rollator and contact guard assist. Ascending and descending stairs with hand rail and contact guard assist. Barrier: impulsiveness and decreased safety awareness. Plan to return home alone with assistance from sister. HH: TBD. SNF Attending:  Davian Lucio MD    Medications were not reconciled and general patient assessment was not completed during this skilled nursing facility outreach.      BAKARI Matias

## 2018-06-05 NOTE — Clinical Note
Discussed cardiology FU 6/11. Requested SNF send weight trend with pt to FU appointment. Current weight 141lbs. Fluid restriction continues. SNF coordinator to confirm transportation with O2

## 2018-06-05 NOTE — PROGRESS NOTES
Community Care Team Documentation for Patient in [facility]  Subsequent Follow up     Patient remains at [facility] ([facility]). See previous [facility] Team notes. PCP : Linard Nageotte, MD    Per Ke Sheehan at [facility], PT/OT continue. Pt performing stairs, transfers and ambulation with stand by assist. Discussed cardiology FU 6/11. Requested SNF send weight trend with pt to FU appointment. Current weight 141lbs. Fluid restriction continues. SNF coordinator to confirm transportation with O2. Home O2 through [facility]. Last therapy treat day scheduled for 6/13. Plan to discharge home alone with sisters assistance and [facility] 6/14. [facility] agency: TBD. Requested SNF provide CHF education prior to DC. Medications were not reconciled and general patient assessment was not completed during this skilled nursing facility outreach.      AILYN KellyW

## 2018-06-12 NOTE — PROGRESS NOTES
Community Care Team Documentation for Patient in [facility]  Subsequent Follow up     Patient remains at [facility] ([facility]). See previous [facility] Team notes. PCP : Trino Spear MD    Per Jahaira Klein and team at [facility], PT/OT continue with progress. Currently independent with bed mobility, SBA for transfers, and SBA for ambulation with RW.  O2 sats continue to drop with exertion. Patient on home O2 through [address]. 1200 mL fluid restriction in place. Labs drawn - normal.  Discharge postponed due to fall over this past weekend. No injury. Plan for discharge to home alone with sister's assistance and home health. Date/ [facility] agency TBD. Medications were not reconciled and general patient assessment was not completed during this skilled nursing facility outreach.      Andrea Madsen, MSN, RN, ACNS-BC, [facility]  Nurse Navigator, [place] [phone]

## 2018-06-19 NOTE — PROGRESS NOTES
Community Care Team Documentation for Patient in [facility]  Subsequent Follow up     Patient remains at [facility] ([facility]). See previous [facility] Team notes. PCP : Lou Copeland MD    Per Dwayne Gao at [facility], PT/OT continue. Pt is ambulating 300ft with a RW and stand by assist. Transferring with stand by assist. Stand by assist to modified independent with ADLs. Weight today 6/19; 146.3lbs. SNF looking into compliance with fluid restriction. SOB reported by pt. Pt increased from 2L O2 to 3L O2. Chest X ray completed. Family meeting to be held to discuss disposition. PTA pt was living alone with sisters assistance. Discussed possibility of pt staying with sister, hiring assistance, [facility] placement, [facility] with telemonitoring etc. LOS TBD. Medications were not reconciled and general patient assessment was not completed during this skilled nursing facility outreach.      AILYN CrespoW

## 2018-06-26 NOTE — PROGRESS NOTES
Community Care [facility]  Subsequent Follow up     [facility] See previous [facility] Team notes. PCP : Darling Miguel MD    Per Bjorn Springer at [facility], PT/OT continue. Ambulating 200ft with a rollator and stand by assist. Independent with bed mobility. Stand by assist with sit to stand transfers and to clear four stairs. Modified independent with ADLs. Daily weights continue. 6/22 139.6lbs, 6/26 139.2lbs. O2 increased to 3L per minute. SNF dietitian to reeducate on fluid restriction. Plan to DC 6/28 home alone with NADIA DAMON [facility] PT/OT/SN/ telemonitoring and sisters assistance. Pt's sister looking into financial resources for [facility] placement. Cardiology follow up 7/3 at 10:40AM.    Medications were not reconciled and general patient assessment was not completed during this skilled nursing facility outreach.      BAKARI Reed

## 2018-06-26 NOTE — Clinical Note
Daily weights continue. 6/22 139.6lbs, 6/26 139.2lbs. O2 increased to 3L per minute. SNF dietitian to reeducate on fluid restriction. Plan to DC 6/28 home alone with NADIA KLEIN [facility] PT/OT/SN/ telemonitoring and sisters assistance. Pt's sister looking into financial resources for [facility] placement.   Cardiology follow up 7/3 at 10:40AM.

## 2018-07-02 NOTE — PROGRESS NOTES
Hospital Discharge Follow-Up      Date/Time:  2018 12:48 PM    Patient was admitted to [facility] Single Select Template:::\"[facility] [address]\",\"[facility]\",\"[facility] [address] [facility]\",\"[facility]\"[facility]\",\"[facility]\",\"[facility]\",\"[facility]\",\"[facility]\",\"[facility]\",\"[facility] [address] [facility] [address]\",\"[facility]\",\"[facility]\",\"[facility]\",\"[facility]\",\"[facility]\",\"[facility]\",\"[facility]\",\"[facility]\",\"[facility]\",\"[address]\",\"[facility]\",\"[address]\"} on *** and discharged on *** for ***. The physician discharge summary {Blank Single Select Template:::\"was\",\"was not\"} available at the time of outreach. Patient was contacted within *** business days of discharge. Top Challenges reviewed with the provider   ***         Method of communication with provider :{Blank Multiple Select Template:::\"face to face\",\"chart routing\",\"staff message\",\"phone\",\"none\"}    Inpatient RRAT score: ***  Was this a readmission? {gen no default/yes/free text:683198::\"yes\"}   Patient stated reason for the readmission: ***    Nurse Navigator (NN) contacted the {Blank Single Select Template:::\"patient\",\"family\",\"caregiver\",\"parent\"} by telephone to perform post hospital discharge assessment. Verified name and  with {Blank Single Select Template:::\"patient\",\"family\",\"caregiver\",\"parent\"} as identifiers.  Provided introduction to self, and explanation of the Nurse Navigator role. Reviewed discharge instructions and red flags with {Blank Single Select Template:20061::\"patient\",\"family\",\"caregiver\",\"parent\"} who verbalized understanding. {Blank Single Select Template:20061::\"Patient\",\"Family\",\"Caregiver\",\"Parent\"} given an opportunity to ask questions and does not have any further questions or concerns at this time. The {Blank Single Select Template:20061::\"patient\",\"family\",\"caregiver\",\"parent\"} agrees to contact the PCP office for questions related to their healthcare. NN provided contact information for future reference. Disease Specific:   {Blank Single Select Template:20061::\"CHF\",\"COPD\",\"Sepsis\",\"N/A\"}    Summary of patient's top problems:  1. ***  2. ***  3. ***    Home Health orders at discharge: {orders:44488623}  [address]: ***  Date of initial visit: ***    Durable Medical Equipment ordered/company: ***  Durable Medical Equipment received: ***    Barriers to care? {Blank Multiple Select Template:20062::\"depression\",\"financial\",\"ineffective coping\",\"lack of knowledge about disease\",\"level of motivation\",\"medication management\",\"PCP relationship\",\"stages of grief\",\"support system\",\"transportation\",\"utilization of services\"}    Advance Care Planning:   Does patient have an Advance Directive:  {Blank Single Select Template:20061::\"reviewed and current\",\"reviewed and needs to be updated\",\"not on file; education provided\",\"not on file\",\"patient declined education\",\"referred to a Certified Facilitator\"}     Medication(s):     New Medications at Discharge: ***  Changed Medications at Discharge: ***  Discontinued Medications at Discharge: ***    Medication reconciliation was performed with {Blank Single Select Template:20061::\"patient\",\"family\",\"caregiver\",\"parent\"}, who verbalizes understanding of administration of home medications.   There {Blank Single Select Template:20061::\"were\",\"were no\"} barriers to obtaining medications identified at this time. Referral to Pharm D needed: {gen no default/yes/free text:960215::\"yes\"}     Current Outpatient Prescriptions   Medication Sig    OXYGEN-AIR DELIVERY SYSTEMS 2 L/min by Nasal route continuous.  (No Medication Selected) 100 Units by SubCUTAneous route Before breakfast, lunch, and dinner. sliding scale  blood sugar 200 to 249, give 2 units  bllod sugar 250 to 299, give 3 units  blood sugar 300 to 349, give 4 units  blood sugar is greater than 349, call md    hydrALAZINE (APRESOLINE) 25 mg tablet Take 1 Tab by mouth three (3) times daily.  insulin glargine (LANTUS U-100 INSULIN) 100 unit/mL injection 10 Units by SubCUTAneous route two (2) times a day.  levothyroxine (SYNTHROID) 100 mcg tablet Take 100 mcg by mouth six (6) days a week. Monday through Saturday    levothyroxine (SYNTHROID) 100 mcg tablet Take 200 mcg by mouth every Sunday.  vit A/vit C/vit E/zinc/copper (PRESERVISION AREDS PO) Take 1 Tab by mouth daily.  carvedilol (COREG) 6.25 mg tablet Take  by mouth two (2) times daily (with meals).  bumetanide (BUMEX) 2 mg tablet Take 2 mg by mouth daily.  aspirin delayed-release 81 mg tablet Take  by mouth daily. No current facility-administered medications for this visit. There are no discontinued medications.     PCP/Specialist follow up: Future Appointments  Date Time Provider Alice Thornton   7/3/2018 10:40 AM Jazmine Hernández  [address]   7/3/2018 To Be Determined Marlena Sultana LPN [place] [id number]   7/6/2018 To Be Determined Marlena Sultana LPN [facility]          Goals     None

## 2018-07-02 NOTE — PROGRESS NOTES
Nurse navigator note - cardiology    Follow up call post discharge from [facility] - to home with [address] [facility], PT/OT/ home health aide and tele-monitoring - OT and PT visits today - 7/2 - Pt's sister has been evaluating facilities for her - assisted living -     Ms. Kinga Machado went and visited [facility] - \"I was so disappointed with that facility - $2600.a month \"; I am not material for a place like that - they call it assisted living - I am taking every precaution at home not to fall - watching the oxygen tubing \"; she did not like the option - \"this is not for me\". Oxygen - 3 liters - continuous - with concentrator and portable tanks -   Meals - her sister shops for her - she did not like meals on wheels options; admits to eating a lot of frozen dinners. Her sister does not get everything she needs -     Medication - [facility] delivers - she does her meds - on Sat pm for the week  NO scale, no life alert - will provide scale/ information re: life alert -   (she has called fire dept before - near her)    Concerns identified:  Hydralazine - making her dizzy - she has not taken since discharge [facility]; she has bp cuff -   (requested she check her bp at home - and if dizzy )  Glucose this am - 65 - in the am -   (Requested that she check glucose at night  - and see pcp for review of her regimen - as she was on sliding scale at rehab). Pt's sister is [de-identified] and has fallen twice when trying to help Ms. Kinga Machado -   Transportation with [facility]: Requested discharge summary from [address] [place] [address] for sister - re: pt's ability to manage at home -    Called to verify [facility] services - maximal service with [facility] agency. Daily am weights / monitor bp and pulse   Provide scale   Pt to call pcp re: glucose #s and medication regimen - discussed avoiding lows - at night!    Pt is not taking Hydralazine - dizziness - since [facility]    Dr. Sunita Luevano appt 7/3 - 10:40am   MD on call 24/7 at [phone] . Goals      Maintains daily weight. 7/2/18  Pt will do daily am weights - telemonitoring to be set up  Pt given scale for continued home monitoring    Am - after voiding - and record. ttk       Patient verbalizes understanding of self-management goals of living with Congestive Heart Failure            7/2/18  Pt has bp cuff - monitor and record bp / mata if feeling dizzy -   Review with MD - for medicaiton adjustments. Glucose checks - varied times/ check if suspects low -  Call pcp for diabetes regimen adjustments, as needed. Avoid lows - call if glucose low. Alix Borja RN , [facility], [facility]  [facility]  [phone]       Supportive resources in place to maintain patient in the community (ie. Home Health, DME equipment, refer to, medication assistant plan, etc.)            7/2/18   Pt d/c from [place] [address] with maximal [facility] support - nursing, PT/ OT/ home health aide          Telemonitoring.     Will provide pt with her own scale at visit tomorrow - 10:40    Transportation with [facility]  Medication delivery with LAWRENCE Lara , [facility], [facility]   [facility]  [phone]

## 2018-07-02 NOTE — Clinical Note
D/c with [facility] services - MD appt 7/3 - requested records from Maricruz Owens prior to visit tomorrow.  ttk

## 2018-07-02 NOTE — Clinical Note
Pt not taking Hydralazine - had hypo last night - glucose 65 -  Needs scale - will provide at office visit.  High readmit potential  Veras Mealing

## 2018-07-03 NOTE — PROGRESS NOTES
Community Care Team Documentation for Patient in [facility]  Discharge Note    Per SNF staff, patient discharged from [employer] ([facility] See previous [facility] Team notes. PCP : Lynne Pride MD    Per Shelly Garcia at [facility],  [place] 6/28 home alone with [facility] PT/OT/SN/ telemonitoring and sisters assistance. Cardiology follow up 7/3 at 10:40AM. Sister looking into [facility] or [facility]. SNF encouraged SARAH placement following SNF. Pt requested to trial home with [facility] prior to SARAH placement. [facility] transport arranged. Pharmacy to deliver meds to home. CCT received discharge med list from SNF and provided it to Cardiology NN. Community Care Team will sign off at this time. Medications were not reconciled and general patient assessment was not completed during this skilled nursing facility outreach.

## 2018-07-03 NOTE — MR AVS SNAPSHOT
[address] [facility] [address] [facility] 
[phone] Patient: Pako Yao MRN: [id number] :1935 Visit Information Date & Time Provider Department Dept. Phone Encounter #  
 7/3/2018 10:40 AM Lisa Lopez MD [facility] Shauna Dempsey [phone] [id number] Upcoming Health Maintenance Date Due  
 FOOT EXAM Q1 1945 MICROALBUMIN Q1 1945 EYE EXAM RETINAL OR DILATED Q1 1946 DTaP/Tdap/Td series (1 - Tdap) 1957 ZOSTER VACCINE AGE 60> 10/20/1996 GLAUCOMA SCREENING Q2Y 2001 Bone Densitometry (Dexa) Screening 2001 Pneumococcal 65+ Low/Medium Risk (1 of 2 - PCV13) 2001 MEDICARE YEARLY EXAM 3/14/2018 Influenza Age 5 to Adult 2018 LIPID PANEL Q1 10/5/2018 HEMOGLOBIN A1C Q6M 2018 Allergies as of 7/3/2018  Review Complete On: 7/3/2018 By: Johnie Gamble Severity Noted Reaction Type Reactions Ace Inhibitors High 2018    Other (comments) Elevated creatinine Other Medication  2017    Rash Auromycin Chlorocetin Current Immunizations  Reviewed on 2018 No immunizations on file. Not reviewed this visit Vitals BP Pulse Resp Height(growth percentile) Weight(growth percentile) SpO2  
 120/60 (BP 1 Location: Left arm, BP Patient Position: Sitting) 88 16 5' 1\" (1.549 m) 135 lb 6.4 oz (61.4 kg) (!) 80% BMI OB Status Smoking Status 25.58 kg/m2 Postmenopausal Former Smoker BMI and BSA Data Body Mass Index Body Surface Area 25.58 kg/m 2 1.63 m 2 Preferred Pharmacy Pharmacy Name Phone [facility] [address] [facility] Valentino [address] [phone] Your Updated Medication List  
  
   
This list is accurate as of 7/3/18 12:15 PM.  Always use your most recent med list.  
  
  
  
  
 aspirin delayed-release 81 mg tablet Take  by mouth daily. bumetanide 2 mg tablet Commonly known as:  Severiano Bue Take 2 mg by mouth daily. carvedilol 6.25 mg tablet Commonly known as:  Haskel Alex Take  by mouth two (2) times daily (with meals). insulin regular sliding scale 100 units/ml injection Commonly known as:  NOVOLIN R  
100 Units by SubCUTAneous route Before breakfast, lunch, and dinner. sliding scale blood sugar 200 to 249, give 2 units bllod sugar 250 to 299, give 3 units blood sugar 300 to 349, give 4 units blood sugar is greater than 349, call md  
  
 LANTUS U-100 INSULIN 100 unit/mL injection Generic drug:  insulin glargine 10 Units by SubCUTAneous route two (2) times a day. * levothyroxine 100 mcg tablet Commonly known as:  SYNTHROID Take 100 mcg by mouth six (6) days a week. Monday through Saturday * levothyroxine 100 mcg tablet Commonly known as:  SYNTHROID Take 200 mcg by mouth every Sunday. OXYGEN-AIR DELIVERY SYSTEMS  
2 L/min by Nasal route continuous. PRESERVISION AREDS PO Take 1 Tab by mouth daily. * Notice: This list has 2 medication(s) that are the same as other medications prescribed for you. Read the directions carefully, and ask your doctor or other care provider to review them with you. To-Do List   
 07/05/2018 To Be Determined Appointment with Andreina Wong at [place] [address]  
  
 07/05/2018 To Be Determined Appointment with Abdulkadir Alas at [place] [address]  
  
 07/05/2018 1:45 PM  
  Appointment with Oswaldo Chamorro at [place] [address]  
  
 07/06/2018 To Be Determined Appointment with Marlena Sloan LPN at [place] [address]  
  
 07/09/2018 To Be Determined Appointment with Sarah Mosqueda RN at [place] [address]  
  
 07/09/2018 To Be Determined Appointment with Renetta Feliciano at [place] [address]  
  
 07/09/2018 To Be Determined Appointment with Bill Hodge at [place] [address]  
  
 07/10/2018 To Be Determined Appointment with [place] Knife at [place] [address]  
  
 07/11/2018 To Be Determined Appointment with Bob Barboza LPN at [place] [address]  
  
 07/11/2018 To Be Determined Appointment with Renetta Feliciano at [place] [address]  
  
 07/11/2018 To Be Determined Appointment with Bill Hodge at [place] [address]  
  
 07/12/2018 To Be Determined Appointment with Mary Ellen Knife at [place] [address]  
  
 07/16/2018 To Be Determined Appointment with Renetta Feliciano at [place] [address]  
  
 07/16/2018 To Be Determined Appointment with Marlen Mosqueda RN at [place] [address]  
  
 07/16/2018 To Be Determined Appointment with Marlen Mosqueda RN at [place] [address]  
  
 07/16/2018 To Be Determined Appointment with Bill Hodge at [place] [address]  
  
 07/18/2018 To Be Determined Appointment with Renetta Feliciano at [place] [address]  
  
 07/18/2018 To Be Determined Appointment with Bill Hodge at [place] [address]  
  
 07/18/2018 To Be Determined Appointment with Bob Barboza LPN at [place] [address]  
  
 07/23/2018 To Be Determined Appointment with Bob Barboza LPN at [place] [address]  
  
 07/23/2018 To Be Determined Appointment with Renetta Feliciano at [place] [address]  
  
 07/24/2018 To Be Determined   Appointment with Damion Wilder PT at [place] [address]  
  
 07/25/2018 To Be Determined Appointment with Shila Fontaine at [place] [address]  
  
 07/30/2018 To Be Determined Appointment with Esther Nguyen LPN at [place] [address]  
  
 07/30/2018 To Be Determined Appointment with Josefinail Minal at [place] [address]  
  
 08/01/2018 To Be Determined Appointment with Josefinail Minal at [place] [address]  
  
 08/06/2018 To Be Determined Appointment with Esther Nguyen LPN at [place] [address]  
  
 08/06/2018 To Be Determined Appointment with Josefinail Minal at [place] [address]  
  
 08/08/2018 To Be Determined Appointment with Josefinail Minal at [place] [address]  
  
 08/13/2018 To Be Determined Appointment with Molly Jurado. LAWRENCE Mosqueda at [place] [address]  
  
 08/20/2018 To Be Determined Appointment with Molly Jurado. LAWRENCE Mosqueda at [place] [address] Patient Instructions You will need to follow up in clinic with Dr. Sanya Matta in 6 months. There were changes made to your medications at this appointment. Please note the following: STOP Hydralazine Please limit your fluid to 1500 mL per day. Introducing [facility] & HE[facility]! [place] introduces [employer] patient portal. Now you can access parts of your medical record, email your doctor's office, and request medication refills online. 1. In your internet browser, go to [email or web address] 2. Click on the First Time User? Click Here link in the Sign In box. You will see the New Member Sign Up page. 3. Enter your [employer] Access Code exactly as it appears below. You will not need to use this code after youve completed the sign-up process. If you do not sign up before the expiration date, you must request a new code.  
 
· Trans[employer] Access Code: [address] 
 Expires: 9/24/2018 10:05 AM 
 
4. Enter the last four digits of your Social Security Number (xxxx) and Date of Birth (mm/dd/yyyy) as indicated and click Submit. You will be taken to the next sign-up page. 5. Create a [employer] ID. This will be your [employer] login ID and cannot be changed, so think of one that is secure and easy to remember. 6. Create a [employer] password. You can change your password at any time. 7. Enter your Password Reset Question and Answer. This can be used at a later time if you forget your password. 8. Enter your e-mail address. You will receive e-mail notification when new information is available in [address] 9. Click Sign Up. You can now view and download portions of your medical record. 10. Click the Download Summary menu link to download a portable copy of your medical information. If you have questions, please visit the Frequently Asked Questions section of the [employer] website. Remember, [employer] is NOT to be used for urgent needs. For medical emergencies, dial 911. Now available from your iPhone and Android! Please provide this summary of care documentation to your next provider. Your primary care clinician is listed as Bryanna Camargo. If you have any questions after today's visit, please call [phone].

## 2018-07-03 NOTE — Clinical Note
Jacob Damico     1935       Jodi Venegas MD, [facility] Date of Visit-7/15/2018 PCP is Joyce Leonard MD  
[facility] [place] [facility] HPI:  Jacob Damico is a 81 y.o. female Ms. Carmella Reyes has a history of congestive heart failure with RV dysfunction due to lung disease. An echocardiogram in February, 2018 showed an EF of 45-50% with mild LVH and dilated right ventricle and PA pressure of 56. She has known left bundle branch block and moderate kidney disease with creatinine of 1.6 at that time. She'd gone to her endocrinologist when admitted due to shortness of breath. I saw her in March. She appeared compensated for combined diastolic and systolic failure, had significant COPD. She was continued on Coreg and 2 mg of Bumex at that time. She was then admitted to [facility] May 17th through May 22nd with acute on chronic hypoxic respiratory failure thought to be due to COPD. An echocardiogram was done that showed EF confirmed to be normal at 55-60%, her creatinine was 1.7. She was seen during that hospital stay by Dr. Mega Ring and myself. Looks like a lot of this was related to her tank was off and she didn't have her tank when she went to the endocrinologist's office on May 17th with edema. We felt her shortness of breath was due to limited respiratory reserve with severe COPD and chronic cor pulmonale with a normal EF. Had been dizzy with Digoxin in the past and that was stopped and ACE inhibitor. The main treatment seemed to be oxygen and diuretic. Today she returns. She says she's been at [facility] rehab. Was discharged from [facility] on 06/28. She reports mild edema, shortness of breath and dizziness. Hydralazine made her dizzy. She is restricted to 1,200 cc a day and wants that increased. She also takes Zofran on a sliding scale. She has 1+ edema of her lower extremities.   Her vitals indicate that her blood pressure runs 552-393 systolic, most systolics are about 534. She's had reasonable O2 sats and a weight that has been stable, 139-136. She continues on aspirin, Bumex, Carvedilol and is now off Hydralazine. There has been some concern about her ability to manage at home and *** with home health has been recommended by our nurse navigator on 07/02. Notes also indicate that if her PT and OT had her ambulating at 200 feet prior to discharge. Notes from [facility] were also reviewed, mainly discharge instructions and orders. Continue Bumex at 2 mg. Assessment/Plan: 1. Diastolic CHF appears to be well compensated. Her weight has been stable. I really felt her hospitalization in May was much more due to her COPD. She wants to be on less fluid restriction. I'm certainly fine going from 1,200 to 1,500 cc. Her problem is mainly her cor pulmonale 2. Dilated RV and RV failure, which have improved and are related to her lung disease. 3. COPD. Have recommended she continue oxygen at all times. 4. Moderate kidney disease, stable. 5. Follow up in six months. Records are reviewed from [facility], our nurse navigator in the chart, including previous discharge. Note that she was offered [facility] OT and PT and elected instead to go home on 06/28 from [facility] with her sister taking care of her and that was her patient preference. Patient Instructions You will need to follow up in clinic with Dr. Shashank Green in 6 months. There were changes made to your medications at this appointment. Please note the following: STOP Hydralazine Please limit your fluid to 1500 mL per day. Key CAD CHF Meds   
    
  
 carvedilol (COREG) 6.25 mg tablet  (Taking) Take  by mouth two (2) times daily (with meals). bumetanide (BUMEX) 2 mg tablet  (Taking) Take 2 mg by mouth daily. aspirin delayed-release 81 mg tablet  (Taking) Take  by mouth daily. Impression: 1. Chronic combined systolic and diastolic CHF (congestive heart failure) ([facility] 2. COPD, severe ([facility] 3. Cor pulmonale ([facility] 4. Essential hypertension Cardiac History:  
Cor pulmonale June 2017-  ECHO 2017 RV enlargement lower RVEF with normal LVEF 55-60% ABIs 2017 WNL [facility] 7-13-17 =Lexiscan pharmacologic stress test shows normal perfusion with an EF of 64 %. Admit  
---Echo 2/7/18 EF 45-50% with mild LVH, dilated RV with moderate RV failure, PA pressure of 56. creatine 1.64, pt was discharged on 2/9/18 with pulmonary edema mixed diastolic/systolic dysfunction and acute kidney injury. -dehydration from diarrhea she had a foot ulcer with cellulitis --carotid dopplers with minimal disease. ROS-except as noted above. . A complete cardiac and respiratory are reviewed and negative except as above ; Resp-denies wheezing  or productive cough,. Const- No unusual weight loss or fever; Neuro-no recent seizure or CVA ; GI- No BRBPR, abdom pain, bloating ; - no  hematuria  
see supplement sheet, initialed and to be scanned by staff Past Medical History:  
Diagnosis Date  MORGAN (acute kidney injury) ([facility] 3/25/2018  Arthritis  Chronic obstructive pulmonary disease ([facility] 7/23/2017  Cor pulmonale ([facility] 8/29/2017  Diabetes ([facility]  Elevated brain natriuretic peptide (BNP) level 7/23/2017  Essential hypertension 7/23/2017  Shortness of breath  Thyroid disease Social Hx= reports that she quit smoking about 18 years ago. She has never used smokeless tobacco. She reports that she does not drink alcohol or use illicit drugs. Exam and Labs:  /60 (BP 1 Location: Left arm, BP Patient Position: Sitting)  Pulse 88  Resp 16  Ht 5' 1\" (1.549 m)  Wt 135 lb 6.4 oz (61.4 kg)  SpO2 (!) 80%  BMI 25.58 kg/u1Rghoogtanjzyla:  NAD, comfortable Head: NC,AT. Eyes: No scleral icterus. Neck:  Neck supple. No JVD present. Throat: moist mucous membranes. Chest: Effort normal & normal respiratory excursion . Neurological: alert, conversant and oriented . Skin: Skin is not cold. No obvious systemic rash noted. Not diaphoretic. No erythema. Psychiatric:  Grossly normal mood and affect. Behavior appears normal. Extremities:  no clubbing or cyanosis. Abdomen: non distended Lungs:few crackles at the bases, decreased air movement throughout. No stridor. distress, wheezes or  Rales. Heart: normal rate, regular rhythm, normal S1, S2, no murmurs, rubs, clicks or gallops , PMI non displaced. Edema: Edema is 1+ at the ankles, dressing on the bottom of the right foot. Wt Readings from Last 3 Encounters:  
07/09/18 132 lb 12.8 oz (60.2 kg) 07/06/18 134 lb 3.2 oz (60.9 kg) 07/03/18 135 lb 6.4 oz (61.4 kg) BP Readings from Last 3 Encounters:  
07/12/18 177/88  
07/12/18 152/90  
07/10/18 179/86 Current Outpatient Prescriptions Medication Sig  
 OXYGEN-AIR DELIVERY SYSTEMS 2 L/min by Nasal route continuous.  (No Medication Selected) 100 Units by SubCUTAneous route Before breakfast, lunch, and dinner. sliding scale 
blood sugar 200 to 249, give 2 units 
bllod sugar 250 to 299, give 3 units 
blood sugar 300 to 349, give 4 units 
blood sugar is greater than 349, call md  
 insulin glargine (LANTUS U-100 INSULIN) 100 unit/mL injection 10 Units by SubCUTAneous route two (2) times a day.  levothyroxine (SYNTHROID) 100 mcg tablet Take 100 mcg by mouth six (6) days a week. Monday through Saturday  levothyroxine (SYNTHROID) 100 mcg tablet Take 200 mcg by mouth every Sunday.  vit A/vit C/vit E/zinc/copper (PRESERVISION AREDS PO) Take 1 Tab by mouth daily.  carvedilol (COREG) 6.25 mg tablet Take  by mouth two (2) times daily (with meals).  bumetanide (BUMEX) 2 mg tablet Take 2 mg by mouth daily.  aspirin delayed-release 81 mg tablet Take  by mouth daily.  lisinopril (PRINIVIL, ZESTRIL) 5 mg tablet Take 5 mg by mouth two (2) times a day. No current facility-administered medications for this visit. Impression see above.

## 2018-07-03 NOTE — PATIENT INSTRUCTIONS
You will need to follow up in clinic with Dr. Amos Cornell in 6 months. There were changes made to your medications at this appointment. Please note the following:  STOP Hydralazine    Please limit your fluid to 1500 mL per day.

## 2018-07-15 PROBLEM — N17.9 AKI (ACUTE KIDNEY INJURY) (HCC): Status: RESOLVED | Noted: 2018-01-01 | Resolved: 2018-01-01

## 2018-07-15 PROBLEM — R79.89 ELEVATED BRAIN NATRIURETIC PEPTIDE (BNP) LEVEL: Status: RESOLVED | Noted: 2017-07-23 | Resolved: 2018-01-01

## 2018-07-15 PROBLEM — I50.42 CHRONIC COMBINED SYSTOLIC AND DIASTOLIC CHF (CONGESTIVE HEART FAILURE) (HCC): Chronic | Status: ACTIVE | Noted: 2018-01-01

## 2018-07-15 PROBLEM — I50.9 CHF EXACERBATION (HCC): Status: RESOLVED | Noted: 2018-01-01 | Resolved: 2018-01-01

## 2018-07-15 NOTE — PROGRESS NOTES
Ms. Jaron Cuba has a history of congestive heart failure with RV dysfunction due to lung disease. An echocardiogram in February, 2018 showed an EF of 45-50% with mild LVH and dilated right ventricle and PA pressure of 56. She has known left bundle branch block and moderate kidney disease with creatinine of 1.6 at that time. She'd gone to her endocrinologist when admitted due to shortness of breath. I saw her in March. She appeared compensated for combined diastolic and systolic failure, had significant COPD. She was continued on Coreg and 2 mg of Bumex at that time. She was then admitted to [address] [place] [address] May 17th through May 22nd with acute on chronic hypoxic respiratory failure thought to be due to COPD. An echocardiogram was done that showed EF confirmed to be normal at 55-60%, her creatinine was 1.7. She was seen during that hospital stay by Dr. Ko Alvarez and myself. Looks like a lot of this was related to her tank was off and she didn't have her tank when she went to the endocrinologist's office on May 17th with edema. We felt her shortness of breath was due to limited respiratory reserve with severe COPD and chronic cor pulmonale with a normal EF. Had been dizzy with Digoxin in the past and that was stopped and ACE inhibitor. The main treatment seemed to be oxygen and diuretic.

## 2018-07-15 NOTE — PROGRESS NOTES
1. Diastolic CHF appears to be well compensated. Her weight has been stable. I really felt her hospitalization in May was much more due to her COPD. She wants to be on less fluid restriction. I'm certainly fine going from 1,200 to 1,500 cc. Her problem is mainly her cor pulmonale  2. Dilated RV and RV failure, which have improved and are related to her lung disease. 3. COPD. Have recommended she continue oxygen at all times. 4. Moderate kidney disease, stable. 5. Follow up in six months. Records are reviewed from [facility], our nurse navigator in the chart, including previous discharge. Note that she was offered [facility] OT and PT and elected instead to go home on 06/28 from [facility] with her sister taking care of her and that was her patient preference.

## 2018-07-15 NOTE — PROGRESS NOTES
Jazzy Moreno     1935       Jodi Fang MD, [facility] [place]  Date of Visit-7/15/2018   PCP is Sherren Burly, MD   [facility] [place]  [facility]  HPI:  Jazzy Moreno is a 81 y.o. female      Dictation on: 07/15/2018  3:12 PM by: Kassie Fallon [[id number]]          Dictation on: 07/15/2018  3:15 PM by: Kassie Fallon [[id number]]               Assessment/Plan:      Dictation on: 07/15/2018  3:20 PM by: Kassie Fallon [[id number]]             Patient Instructions   You will need to follow up in clinic with Dr. Tavon Fang in 6 months. There were changes made to your medications at this appointment. Please note the following:  STOP Hydralazine    Please limit your fluid to 1500 mL per day. Key CAD CHF Meds             carvedilol (COREG) 6.25 mg tablet  (Taking) Take  by mouth two (2) times daily (with meals). bumetanide (BUMEX) 2 mg tablet  (Taking) Take 2 mg by mouth daily. aspirin delayed-release 81 mg tablet  (Taking) Take  by mouth daily. Impression:   1. Chronic combined systolic and diastolic CHF (congestive heart failure) ([address]    2. COPD, severe ([address]    3. Cor pulmonale (HCC)    4. Essential hypertension       Cardiac History:   Cor pulmonale  June 2017-   ECHO 2017 RV enlargement lower RVEF with normal LVEF 55-60%  ABIs 2017 WNL  [facility] 7-13-17 =Lexiscan pharmacologic stress test shows normal perfusion with an EF of 64 %. Admit   ---Echo 2/7/18 EF 45-50% with mild LVH, dilated RV with moderate RV failure, PA pressure of 56. creatine 1.64, pt was discharged on 2/9/18 with pulmonary edema mixed diastolic/systolic dysfunction and acute kidney injury. -dehydration from diarrhea she had a foot ulcer with cellulitis   --carotid dopplers with minimal disease. ROS-except as noted above. . A complete cardiac and respiratory are reviewed and negative except as above ; Resp-denies wheezing  or productive cough,.  Const- No unusual weight loss or fever; Neuro-no recent seizure or CVA ; GI- No BRBPR, abdom pain, bloating ; - no  hematuria   see supplement sheet, initialed and to be scanned by staff  Past Medical History:   Diagnosis Date    MORGAN (acute kidney injury) ([facility] 3/25/2018    Arthritis     Chronic obstructive pulmonary disease ([facility] 7/23/2017    [facility] 8/29/2017    Diabetes ([facility]     Elevated brain natriuretic peptide (BNP) level 7/23/2017    Essential hypertension 7/23/2017    Shortness of breath     Thyroid disease       Social Hx= reports that she quit smoking about 18 years ago. She has never used smokeless tobacco. She reports that she does not drink alcohol or use illicit drugs. Exam and Labs:  /60 (BP 1 Location: Left arm, BP Patient Position: Sitting)  Pulse 88  Resp 16  Ht 5' 1\" (1.549 m)  Wt 135 lb 6.4 oz (61.4 kg)  SpO2 (!) 80%  BMI 25.58 kg/m1Lcbzylnqdmpmdo:  NAD, comfortable  Head: NC,AT. Eyes: No scleral icterus. Neck:  Neck supple. No JVD present. Throat: moist mucous membranes. Chest: Effort normal & normal respiratory excursion . Neurological: alert, conversant and oriented . Skin: Skin is not cold. No obvious systemic rash noted. Not diaphoretic. No erythema. Psychiatric:  Grossly normal mood and affect. Behavior appears normal. Extremities:  no clubbing or cyanosis. Abdomen: non distended    Lungs:few crackles at the bases, decreased air movement throughout. No stridor. distress, wheezes or  Rales. Heart: normal rate, regular rhythm, normal S1, S2, no murmurs, rubs, clicks or gallops , PMI non displaced. Edema: Edema is 1+ at the ankles, dressing on the bottom of the right foot.     Wt Readings from Last 3 Encounters:   07/09/18 132 lb 12.8 oz (60.2 kg)   07/06/18 134 lb 3.2 oz (60.9 kg)   07/03/18 135 lb 6.4 oz (61.4 kg)      BP Readings from Last 3 Encounters:   07/12/18 177/88   07/12/18 152/90   07/10/18 179/86      Current Outpatient Prescriptions   Medication Sig    OXYGEN-AIR DELIVERY SYSTEMS 2 L/min by Nasal route continuous.  (No Medication Selected) 100 Units by SubCUTAneous route Before breakfast, lunch, and dinner. sliding scale  blood sugar 200 to 249, give 2 units  bllod sugar 250 to 299, give 3 units  blood sugar 300 to 349, give 4 units  blood sugar is greater than 349, call md    insulin glargine (LANTUS U-100 INSULIN) 100 unit/mL injection 10 Units by SubCUTAneous route two (2) times a day.  levothyroxine (SYNTHROID) 100 mcg tablet Take 100 mcg by mouth six (6) days a week. Monday through Saturday    levothyroxine (SYNTHROID) 100 mcg tablet Take 200 mcg by mouth every Sunday.  vit A/vit C/vit E/zinc/copper (PRESERVISION AREDS PO) Take 1 Tab by mouth daily.  carvedilol (COREG) 6.25 mg tablet Take  by mouth two (2) times daily (with meals).  bumetanide (BUMEX) 2 mg tablet Take 2 mg by mouth daily.  aspirin delayed-release 81 mg tablet Take  by mouth daily.  lisinopril (PRINIVIL, ZESTRIL) 5 mg tablet Take 5 mg by mouth two (2) times a day. No current facility-administered medications for this visit. Impression see above.

## 2018-07-15 NOTE — PROGRESS NOTES
Today she returns. She says she's been at [facility] rehab. [facility] She reports mild edema, shortness of breath and dizziness. Hydralazine made her dizzy. She is restricted to 1,200 cc a day and wants that increased. She also takes Zofran on a sliding scale. She has 1+ edema of her lower extremities. Her vitals indicate that her blood pressure runs 273-334 systolic, most systolics are about 345. She's had reasonable O2 sats and a weight that has been stable, 139-136. She continues on aspirin, Bumex, Carvedilol and is now off Hydralazine. There has been some concern about her ability to manage at home and *** with home health has been recommended by our nurse navigator on 07/02. Notes also indicate that if her PT and OT had her ambulating at 200 feet prior to discharge. [facility] were also reviewed, mainly discharge instructions and orders. Continue Bumex at 2 mg.

## 2018-07-16 NOTE — TELEPHONE ENCOUNTER
Verified patient with two types of identifiers. Notified pharmacy that Dr. Aditi Polanco did not discontinue Lisinopril. The only changes to patient's medication list at last office visit was discontinuing patient's Hydralazine. Notified pharmacy to check with Dr. Jie Saucedo as he was the last MD to fill patient's Lisinopril. Pharmacy verbalized understanding and will call with any other questions.

## 2018-07-16 NOTE — TELEPHONE ENCOUNTER
Pharmacy would like to know if the lisinopril was discontinued.  The patient said it wasn't supposed to be but they would like to be sure   Phone: [phone]

## 2019-01-01 ENCOUNTER — TELEPHONE (OUTPATIENT)
Dept: CARDIOLOGY CLINIC | Age: 84
End: 2019-01-01

## 2019-01-01 ENCOUNTER — HOSPITAL ENCOUNTER (INPATIENT)
Age: 84
LOS: 2 days | End: 2019-01-27
Attending: INTERNAL MEDICINE | Admitting: INTERNAL MEDICINE

## 2019-01-01 ENCOUNTER — HOSPITAL ENCOUNTER (INPATIENT)
Age: 84
LOS: 8 days | Discharge: HOSPICE | DRG: 291 | End: 2019-01-25
Attending: EMERGENCY MEDICINE | Admitting: FAMILY MEDICINE
Payer: MEDICARE

## 2019-01-01 ENCOUNTER — OFFICE VISIT (OUTPATIENT)
Dept: CARDIOLOGY CLINIC | Age: 84
End: 2019-01-01

## 2019-01-01 ENCOUNTER — HOSPICE ADMISSION (OUTPATIENT)
Dept: HOSPICE | Facility: HOSPICE | Age: 84
End: 2019-01-01
Payer: MEDICARE

## 2019-01-01 ENCOUNTER — APPOINTMENT (OUTPATIENT)
Dept: CT IMAGING | Age: 84
DRG: 291 | End: 2019-01-01
Attending: FAMILY MEDICINE
Payer: MEDICARE

## 2019-01-01 ENCOUNTER — APPOINTMENT (OUTPATIENT)
Dept: ULTRASOUND IMAGING | Age: 84
DRG: 291 | End: 2019-01-01
Attending: INTERNAL MEDICINE
Payer: MEDICARE

## 2019-01-01 ENCOUNTER — APPOINTMENT (OUTPATIENT)
Dept: GENERAL RADIOLOGY | Age: 84
DRG: 291 | End: 2019-01-01
Attending: EMERGENCY MEDICINE
Payer: MEDICARE

## 2019-01-01 ENCOUNTER — HOSPITAL ENCOUNTER (OUTPATIENT)
Dept: LAB | Age: 84
Discharge: HOME | End: 2019-01-10
Payer: MEDICARE

## 2019-01-01 VITALS
OXYGEN SATURATION: 78 % | HEART RATE: 57 BPM | RESPIRATION RATE: 17 BRPM | WEIGHT: 159.39 LBS | DIASTOLIC BLOOD PRESSURE: 52 MMHG | SYSTOLIC BLOOD PRESSURE: 91 MMHG | TEMPERATURE: 97.7 F | BODY MASS INDEX: 30.09 KG/M2 | HEIGHT: 61 IN

## 2019-01-01 VITALS
SYSTOLIC BLOOD PRESSURE: 110 MMHG | HEIGHT: 61 IN | DIASTOLIC BLOOD PRESSURE: 60 MMHG | RESPIRATION RATE: 16 BRPM | WEIGHT: 150 LBS | OXYGEN SATURATION: 90 % | HEART RATE: 74 BPM | BODY MASS INDEX: 28.32 KG/M2

## 2019-01-01 VITALS
HEART RATE: 45 BPM | DIASTOLIC BLOOD PRESSURE: 36 MMHG | OXYGEN SATURATION: 90 % | SYSTOLIC BLOOD PRESSURE: 74 MMHG | TEMPERATURE: 94 F | RESPIRATION RATE: 13 BRPM

## 2019-01-01 DIAGNOSIS — R53.83 FATIGUE, UNSPECIFIED TYPE: ICD-10-CM

## 2019-01-01 DIAGNOSIS — R06.02 SOB (SHORTNESS OF BREATH): ICD-10-CM

## 2019-01-01 DIAGNOSIS — J44.9 COPD, SEVERE (HCC): ICD-10-CM

## 2019-01-01 DIAGNOSIS — R41.0 CONFUSION: ICD-10-CM

## 2019-01-01 DIAGNOSIS — I10 ESSENTIAL HYPERTENSION: ICD-10-CM

## 2019-01-01 DIAGNOSIS — I70.249 ATHEROSCLEROSIS OF NATIVE ARTERY OF BOTH LOWER EXTREMITIES WITH BILATERAL ULCERATION, UNSPECIFIED ULCERATION SITE (HCC): ICD-10-CM

## 2019-01-01 DIAGNOSIS — I50.21 ACUTE SYSTOLIC CONGESTIVE HEART FAILURE (HCC): ICD-10-CM

## 2019-01-01 DIAGNOSIS — R53.1 WEAKNESS GENERALIZED: ICD-10-CM

## 2019-01-01 DIAGNOSIS — I50.42 CHRONIC COMBINED SYSTOLIC AND DIASTOLIC CHF (CONGESTIVE HEART FAILURE) (HCC): ICD-10-CM

## 2019-01-01 DIAGNOSIS — I70.239 ATHEROSCLEROSIS OF NATIVE ARTERY OF BOTH LOWER EXTREMITIES WITH BILATERAL ULCERATION, UNSPECIFIED ULCERATION SITE (HCC): ICD-10-CM

## 2019-01-01 DIAGNOSIS — I27.81 COR PULMONALE, CHRONIC (HCC): Primary | ICD-10-CM

## 2019-01-01 DIAGNOSIS — I27.20 PULMONARY HYPERTENSION (HCC): ICD-10-CM

## 2019-01-01 DIAGNOSIS — Z71.89 GOALS OF CARE, COUNSELING/DISCUSSION: ICD-10-CM

## 2019-01-01 DIAGNOSIS — R79.89 AZOTEMIA: Primary | ICD-10-CM

## 2019-01-01 LAB
ALBUMIN SERPL-MCNC: 2.2 G/DL (ref 3.5–5)
ALBUMIN SERPL-MCNC: 2.5 G/DL (ref 3.5–5)
ALBUMIN SERPL-MCNC: 3.1 G/DL (ref 3.5–5)
ALBUMIN/GLOB SERPL: 0.8 {RATIO} (ref 1.1–2.2)
ALP SERPL-CCNC: 134 U/L (ref 45–117)
ALT SERPL-CCNC: 58 U/L (ref 12–78)
ANION GAP SERPL CALC-SCNC: 10 MMOL/L (ref 5–15)
ANION GAP SERPL CALC-SCNC: 10 MMOL/L (ref 5–15)
ANION GAP SERPL CALC-SCNC: 9 MMOL/L (ref 5–15)
ANION GAP SERPL CALC-SCNC: 9 MMOL/L (ref 5–15)
APPEARANCE UR: ABNORMAL
ARTERIAL PATENCY WRIST A: YES
AST SERPL-CCNC: 33 U/L (ref 15–37)
ATRIAL RATE: 55 BPM
BACTERIA SPEC CULT: NORMAL
BACTERIA URNS QL MICRO: ABNORMAL /HPF
BASE DEFICIT BLD-SCNC: 5 MMOL/L
BASOPHILS # BLD: 0 K/UL (ref 0–0.1)
BASOPHILS # BLD: 0 K/UL (ref 0–0.1)
BASOPHILS NFR BLD: 0 % (ref 0–1)
BASOPHILS NFR BLD: 0 % (ref 0–1)
BDY SITE: ABNORMAL
BILIRUB SERPL-MCNC: 0.5 MG/DL (ref 0.2–1)
BILIRUB UR QL: NEGATIVE
BNP SERPL-MCNC: 1494.3 PG/ML (ref 0–100)
BNP SERPL-MCNC: ABNORMAL PG/ML (ref 0–450)
BUN SERPL-MCNC: 43 MG/DL (ref 8–27)
BUN SERPL-MCNC: 68 MG/DL (ref 6–20)
BUN SERPL-MCNC: 75 MG/DL (ref 6–20)
BUN SERPL-MCNC: 78 MG/DL (ref 6–20)
BUN SERPL-MCNC: 87 MG/DL (ref 6–20)
BUN/CREAT SERPL: 23 (ref 12–28)
BUN/CREAT SERPL: 26 (ref 12–20)
BUN/CREAT SERPL: 28 (ref 12–20)
BUN/CREAT SERPL: 33 (ref 12–20)
BUN/CREAT SERPL: 35 (ref 12–20)
CALCIUM SERPL-MCNC: 6.9 MG/DL (ref 8.5–10.1)
CALCIUM SERPL-MCNC: 7.5 MG/DL (ref 8.5–10.1)
CALCIUM SERPL-MCNC: 7.7 MG/DL (ref 8.5–10.1)
CALCIUM SERPL-MCNC: 7.8 MG/DL (ref 8.5–10.1)
CALCIUM SERPL-MCNC: 8.2 MG/DL (ref 8.7–10.3)
CALCULATED P AXIS, ECG09: 89 DEGREES
CALCULATED R AXIS, ECG10: 125 DEGREES
CALCULATED T AXIS, ECG11: 76 DEGREES
CC UR VC: NORMAL
CHLORIDE SERPL-SCNC: 102 MMOL/L (ref 96–106)
CHLORIDE SERPL-SCNC: 111 MMOL/L (ref 97–108)
CHLORIDE SERPL-SCNC: 113 MMOL/L (ref 97–108)
CHLORIDE SERPL-SCNC: 114 MMOL/L (ref 97–108)
CHLORIDE SERPL-SCNC: 117 MMOL/L (ref 97–108)
CK SERPL-CCNC: 63 U/L (ref 26–192)
CO2 SERPL-SCNC: 21 MMOL/L (ref 21–32)
CO2 SERPL-SCNC: 23 MMOL/L (ref 21–32)
CO2 SERPL-SCNC: 23 MMOL/L (ref 21–32)
CO2 SERPL-SCNC: 24 MMOL/L (ref 21–32)
CO2 SERPL-SCNC: 28 MMOL/L (ref 20–29)
COLOR UR: ABNORMAL
COMMENT, HOLDF: NORMAL
CORTIS SERPL-MCNC: 29.6 UG/DL
CREAT SERPL-MCNC: 1.89 MG/DL (ref 0.57–1)
CREAT SERPL-MCNC: 2.21 MG/DL (ref 0.55–1.02)
CREAT SERPL-MCNC: 2.25 MG/DL (ref 0.55–1.02)
CREAT SERPL-MCNC: 2.58 MG/DL (ref 0.55–1.02)
CREAT SERPL-MCNC: 3.15 MG/DL (ref 0.55–1.02)
DIAGNOSIS, 93000: NORMAL
DIFFERENTIAL METHOD BLD: ABNORMAL
DIFFERENTIAL METHOD BLD: ABNORMAL
EOSINOPHIL # BLD: 0 K/UL (ref 0–0.4)
EOSINOPHIL # BLD: 0.1 K/UL (ref 0–0.4)
EOSINOPHIL NFR BLD: 1 % (ref 0–7)
EOSINOPHIL NFR BLD: 1 % (ref 0–7)
EPITH CASTS URNS QL MICRO: ABNORMAL /LPF
ERYTHROCYTE [DISTWIDTH] IN BLOOD BY AUTOMATED COUNT: 18.3 % (ref 11.5–14.5)
ERYTHROCYTE [DISTWIDTH] IN BLOOD BY AUTOMATED COUNT: 18.6 % (ref 11.5–14.5)
EST. AVERAGE GLUCOSE BLD GHB EST-MCNC: 180 MG/DL
GAS FLOW.O2 O2 DELIVERY SYS: ABNORMAL L/MIN
GAS FLOW.O2 SETTING OXYMISER: 3 L/M
GLOBULIN SER CALC-MCNC: 3.3 G/DL (ref 2–4)
GLUCOSE BLD STRIP.AUTO-MCNC: 101 MG/DL (ref 65–100)
GLUCOSE BLD STRIP.AUTO-MCNC: 105 MG/DL (ref 65–100)
GLUCOSE BLD STRIP.AUTO-MCNC: 109 MG/DL (ref 65–100)
GLUCOSE BLD STRIP.AUTO-MCNC: 119 MG/DL (ref 65–100)
GLUCOSE BLD STRIP.AUTO-MCNC: 127 MG/DL (ref 65–100)
GLUCOSE BLD STRIP.AUTO-MCNC: 136 MG/DL (ref 65–100)
GLUCOSE BLD STRIP.AUTO-MCNC: 140 MG/DL (ref 65–100)
GLUCOSE BLD STRIP.AUTO-MCNC: 142 MG/DL (ref 65–100)
GLUCOSE BLD STRIP.AUTO-MCNC: 150 MG/DL (ref 65–100)
GLUCOSE BLD STRIP.AUTO-MCNC: 152 MG/DL (ref 65–100)
GLUCOSE BLD STRIP.AUTO-MCNC: 153 MG/DL (ref 65–100)
GLUCOSE BLD STRIP.AUTO-MCNC: 159 MG/DL (ref 65–100)
GLUCOSE BLD STRIP.AUTO-MCNC: 159 MG/DL (ref 65–100)
GLUCOSE BLD STRIP.AUTO-MCNC: 170 MG/DL (ref 65–100)
GLUCOSE BLD STRIP.AUTO-MCNC: 43 MG/DL (ref 65–100)
GLUCOSE BLD STRIP.AUTO-MCNC: 45 MG/DL (ref 65–100)
GLUCOSE BLD STRIP.AUTO-MCNC: 49 MG/DL (ref 65–100)
GLUCOSE BLD STRIP.AUTO-MCNC: 49 MG/DL (ref 65–100)
GLUCOSE BLD STRIP.AUTO-MCNC: 51 MG/DL (ref 65–100)
GLUCOSE BLD STRIP.AUTO-MCNC: 61 MG/DL (ref 65–100)
GLUCOSE BLD STRIP.AUTO-MCNC: 65 MG/DL (ref 65–100)
GLUCOSE BLD STRIP.AUTO-MCNC: 75 MG/DL (ref 65–100)
GLUCOSE BLD STRIP.AUTO-MCNC: 81 MG/DL (ref 65–100)
GLUCOSE BLD STRIP.AUTO-MCNC: 85 MG/DL (ref 65–100)
GLUCOSE BLD STRIP.AUTO-MCNC: 88 MG/DL (ref 65–100)
GLUCOSE BLD STRIP.AUTO-MCNC: 98 MG/DL (ref 65–100)
GLUCOSE BLD STRIP.AUTO-MCNC: 98 MG/DL (ref 65–100)
GLUCOSE BLD STRIP.AUTO-MCNC: 99 MG/DL (ref 65–100)
GLUCOSE SERPL-MCNC: 131 MG/DL (ref 65–100)
GLUCOSE SERPL-MCNC: 147 MG/DL (ref 65–100)
GLUCOSE SERPL-MCNC: 171 MG/DL (ref 65–99)
GLUCOSE SERPL-MCNC: 69 MG/DL (ref 65–100)
GLUCOSE SERPL-MCNC: 70 MG/DL (ref 65–100)
GLUCOSE UR STRIP.AUTO-MCNC: NEGATIVE MG/DL
HBA1C MFR BLD: 7.9 % (ref 4.2–6.3)
HCO3 BLD-SCNC: 22.4 MMOL/L (ref 22–26)
HCT VFR BLD AUTO: 46.1 % (ref 35–47)
HCT VFR BLD AUTO: 47.6 % (ref 35–47)
HGB BLD-MCNC: 14 G/DL (ref 11.5–16)
HGB BLD-MCNC: 14.3 G/DL (ref 11.5–16)
HGB UR QL STRIP: ABNORMAL
IMM GRANULOCYTES # BLD AUTO: 0 K/UL (ref 0–0.04)
IMM GRANULOCYTES # BLD AUTO: 0 K/UL (ref 0–0.04)
IMM GRANULOCYTES NFR BLD AUTO: 0 % (ref 0–0.5)
IMM GRANULOCYTES NFR BLD AUTO: 1 % (ref 0–0.5)
INTERPRETATION: NORMAL
KETONES UR QL STRIP.AUTO: NEGATIVE MG/DL
LEUKOCYTE ESTERASE UR QL STRIP.AUTO: ABNORMAL
LYMPHOCYTES # BLD: 0.4 K/UL (ref 0.8–3.5)
LYMPHOCYTES # BLD: 0.4 K/UL (ref 0.8–3.5)
LYMPHOCYTES NFR BLD: 8 % (ref 12–49)
LYMPHOCYTES NFR BLD: 8 % (ref 12–49)
MAGNESIUM SERPL-MCNC: 2.3 MG/DL (ref 1.6–2.4)
MAGNESIUM SERPL-MCNC: 2.6 MG/DL (ref 1.6–2.4)
MCH RBC QN AUTO: 28.8 PG (ref 26–34)
MCH RBC QN AUTO: 29.2 PG (ref 26–34)
MCHC RBC AUTO-ENTMCNC: 30 G/DL (ref 30–36.5)
MCHC RBC AUTO-ENTMCNC: 30.4 G/DL (ref 30–36.5)
MCV RBC AUTO: 95.8 FL (ref 80–99)
MCV RBC AUTO: 96 FL (ref 80–99)
MONOCYTES # BLD: 0.3 K/UL (ref 0–1)
MONOCYTES # BLD: 0.4 K/UL (ref 0–1)
MONOCYTES NFR BLD: 7 % (ref 5–13)
MONOCYTES NFR BLD: 8 % (ref 5–13)
NEUTS SEG # BLD: 3.7 K/UL (ref 1.8–8)
NEUTS SEG # BLD: 4 K/UL (ref 1.8–8)
NEUTS SEG NFR BLD: 83 % (ref 32–75)
NEUTS SEG NFR BLD: 83 % (ref 32–75)
NITRITE UR QL STRIP.AUTO: NEGATIVE
NRBC # BLD: 0 K/UL (ref 0–0.01)
NRBC # BLD: 0 K/UL (ref 0–0.01)
NRBC BLD-RTO: 0 PER 100 WBC
NRBC BLD-RTO: 0 PER 100 WBC
P-R INTERVAL, ECG05: 180 MS
PCO2 BLD: 49.6 MMHG (ref 35–45)
PH BLD: 7.26 [PH] (ref 7.35–7.45)
PH UR STRIP: 5 [PH] (ref 5–8)
PHOSPHATE SERPL-MCNC: 4.8 MG/DL (ref 2.6–4.7)
PHOSPHATE SERPL-MCNC: 5.2 MG/DL (ref 2.6–4.7)
PLATELET # BLD AUTO: 81 K/UL (ref 150–400)
PLATELET # BLD AUTO: 85 K/UL (ref 150–400)
PLATELET COMMENTS,PCOM: ABNORMAL
PMV BLD AUTO: 12.1 FL (ref 8.9–12.9)
PMV BLD AUTO: 12.3 FL (ref 8.9–12.9)
PO2 BLD: 72 MMHG (ref 80–100)
POTASSIUM SERPL-SCNC: 4.6 MMOL/L (ref 3.5–5.1)
POTASSIUM SERPL-SCNC: 4.7 MMOL/L (ref 3.5–5.1)
POTASSIUM SERPL-SCNC: 4.7 MMOL/L (ref 3.5–5.1)
POTASSIUM SERPL-SCNC: 4.9 MMOL/L (ref 3.5–5.1)
POTASSIUM SERPL-SCNC: 5.2 MMOL/L (ref 3.5–5.2)
PROT SERPL-MCNC: 5.8 G/DL (ref 6.4–8.2)
PROT UR STRIP-MCNC: 100 MG/DL
Q-T INTERVAL, ECG07: 560 MS
QRS DURATION, ECG06: 108 MS
QTC CALCULATION (BEZET), ECG08: 535 MS
RBC # BLD AUTO: 4.8 M/UL (ref 3.8–5.2)
RBC # BLD AUTO: 4.97 M/UL (ref 3.8–5.2)
RBC #/AREA URNS HPF: ABNORMAL /HPF (ref 0–5)
RBC MORPH BLD: ABNORMAL
SAMPLES BEING HELD,HOLD: NORMAL
SAO2 % BLD: 91 % (ref 92–97)
SERVICE CMNT-IMP: ABNORMAL
SERVICE CMNT-IMP: NORMAL
SODIUM SERPL-SCNC: 143 MMOL/L (ref 134–144)
SODIUM SERPL-SCNC: 144 MMOL/L (ref 136–145)
SODIUM SERPL-SCNC: 146 MMOL/L (ref 136–145)
SODIUM SERPL-SCNC: 147 MMOL/L (ref 136–145)
SODIUM SERPL-SCNC: 147 MMOL/L (ref 136–145)
SP GR UR REFRACTOMETRY: 1.01 (ref 1–1.03)
SPECIMEN TYPE: ABNORMAL
TROPONIN I SERPL-MCNC: <0.05 NG/ML
TSH SERPL DL<=0.05 MIU/L-ACNC: 0.17 UIU/ML (ref 0.36–3.74)
TSH SERPL DL<=0.05 MIU/L-ACNC: 0.33 UIU/ML (ref 0.36–3.74)
UR CULT HOLD, URHOLD: NORMAL
UROBILINOGEN UR QL STRIP.AUTO: 0.2 EU/DL (ref 0.2–1)
VENTRICULAR RATE, ECG03: 55 BPM
WBC # BLD AUTO: 4.4 K/UL (ref 3.6–11)
WBC # BLD AUTO: 4.9 K/UL (ref 3.6–11)
WBC URNS QL MICRO: ABNORMAL /HPF (ref 0–4)

## 2019-01-01 PROCEDURE — 80069 RENAL FUNCTION PANEL: CPT

## 2019-01-01 PROCEDURE — 36415 COLL VENOUS BLD VENIPUNCTURE: CPT

## 2019-01-01 PROCEDURE — 77030011256 HC DRSG MEPILEX <16IN NO BORD MOLN -A

## 2019-01-01 PROCEDURE — 94760 N-INVAS EAR/PLS OXIMETRY 1: CPT

## 2019-01-01 PROCEDURE — 65660000000 HC RM CCU STEPDOWN

## 2019-01-01 PROCEDURE — 74011250637 HC RX REV CODE- 250/637: Performed by: INTERNAL MEDICINE

## 2019-01-01 PROCEDURE — 80048 BASIC METABOLIC PNL TOTAL CA: CPT

## 2019-01-01 PROCEDURE — 83735 ASSAY OF MAGNESIUM: CPT

## 2019-01-01 PROCEDURE — 82803 BLOOD GASES ANY COMBINATION: CPT

## 2019-01-01 PROCEDURE — 74011000258 HC RX REV CODE- 258: Performed by: INTERNAL MEDICINE

## 2019-01-01 PROCEDURE — 82962 GLUCOSE BLOOD TEST: CPT

## 2019-01-01 PROCEDURE — 74011000258 HC RX REV CODE- 258: Performed by: SPECIALIST

## 2019-01-01 PROCEDURE — 77010033678 HC OXYGEN DAILY

## 2019-01-01 PROCEDURE — 84443 ASSAY THYROID STIM HORMONE: CPT

## 2019-01-01 PROCEDURE — 74011000250 HC RX REV CODE- 250: Performed by: FAMILY MEDICINE

## 2019-01-01 PROCEDURE — 76450000000

## 2019-01-01 PROCEDURE — 74011636637 HC RX REV CODE- 636/637: Performed by: FAMILY MEDICINE

## 2019-01-01 PROCEDURE — 74011250637 HC RX REV CODE- 250/637: Performed by: HOSPITALIST

## 2019-01-01 PROCEDURE — 70450 CT HEAD/BRAIN W/O DYE: CPT

## 2019-01-01 PROCEDURE — 74011250637 HC RX REV CODE- 250/637: Performed by: NURSE PRACTITIONER

## 2019-01-01 PROCEDURE — 99285 EMERGENCY DEPT VISIT HI MDM: CPT

## 2019-01-01 PROCEDURE — 0656 HSPC GENERAL INPATIENT

## 2019-01-01 PROCEDURE — 74011250637 HC RX REV CODE- 250/637: Performed by: FAMILY MEDICINE

## 2019-01-01 PROCEDURE — 84484 ASSAY OF TROPONIN QUANT: CPT

## 2019-01-01 PROCEDURE — 93005 ELECTROCARDIOGRAM TRACING: CPT

## 2019-01-01 PROCEDURE — 0651 HSPC ROUTINE HOME CARE

## 2019-01-01 PROCEDURE — 74011250636 HC RX REV CODE- 250/636: Performed by: HOSPITALIST

## 2019-01-01 PROCEDURE — 71046 X-RAY EXAM CHEST 2 VIEWS: CPT

## 2019-01-01 PROCEDURE — 65610000006 HC RM INTENSIVE CARE

## 2019-01-01 PROCEDURE — 85025 COMPLETE CBC W/AUTO DIFF WBC: CPT

## 2019-01-01 PROCEDURE — 65270000029 HC RM PRIVATE

## 2019-01-01 PROCEDURE — 82533 TOTAL CORTISOL: CPT

## 2019-01-01 PROCEDURE — 93970 EXTREMITY STUDY: CPT

## 2019-01-01 PROCEDURE — 82550 ASSAY OF CK (CPK): CPT

## 2019-01-01 PROCEDURE — 83880 ASSAY OF NATRIURETIC PEPTIDE: CPT

## 2019-01-01 PROCEDURE — 74011250636 HC RX REV CODE- 250/636: Performed by: SPECIALIST

## 2019-01-01 PROCEDURE — 74011250637 HC RX REV CODE- 250/637: Performed by: PHYSICAL MEDICINE & REHABILITATION

## 2019-01-01 PROCEDURE — 74011250636 HC RX REV CODE- 250/636: Performed by: INTERNAL MEDICINE

## 2019-01-01 PROCEDURE — 74011000250 HC RX REV CODE- 250: Performed by: INTERNAL MEDICINE

## 2019-01-01 PROCEDURE — 3336590001 HSPC ROOM AND BOARD

## 2019-01-01 PROCEDURE — 74011250636 HC RX REV CODE- 250/636: Performed by: EMERGENCY MEDICINE

## 2019-01-01 PROCEDURE — 76770 US EXAM ABDO BACK WALL COMP: CPT

## 2019-01-01 PROCEDURE — 74011250636 HC RX REV CODE- 250/636: Performed by: NURSE PRACTITIONER

## 2019-01-01 PROCEDURE — 84100 ASSAY OF PHOSPHORUS: CPT

## 2019-01-01 PROCEDURE — 3336500001 HSPC ELECTION

## 2019-01-01 PROCEDURE — 83036 HEMOGLOBIN GLYCOSYLATED A1C: CPT

## 2019-01-01 PROCEDURE — 65270000032 HC RM SEMIPRIVATE

## 2019-01-01 PROCEDURE — P9047 ALBUMIN (HUMAN), 25%, 50ML: HCPCS | Performed by: HOSPITALIST

## 2019-01-01 PROCEDURE — 81001 URINALYSIS AUTO W/SCOPE: CPT

## 2019-01-01 PROCEDURE — 96374 THER/PROPH/DIAG INJ IV PUSH: CPT

## 2019-01-01 PROCEDURE — 80053 COMPREHEN METABOLIC PANEL: CPT

## 2019-01-01 PROCEDURE — 87086 URINE CULTURE/COLONY COUNT: CPT

## 2019-01-01 PROCEDURE — 36600 WITHDRAWAL OF ARTERIAL BLOOD: CPT

## 2019-01-01 PROCEDURE — 82040 ASSAY OF SERUM ALBUMIN: CPT

## 2019-01-01 RX ORDER — BALSAM PERU/CASTOR OIL
OINTMENT (GRAM) TOPICAL 2 TIMES DAILY
Status: DISCONTINUED | OUTPATIENT
Start: 2019-01-01 | End: 2019-01-01 | Stop reason: HOSPADM

## 2019-01-01 RX ORDER — LORAZEPAM 2 MG/ML
1 INJECTION INTRAMUSCULAR
Status: DISCONTINUED | OUTPATIENT
Start: 2019-01-01 | End: 2019-01-01 | Stop reason: HOSPADM

## 2019-01-01 RX ORDER — LEVOTHYROXINE SODIUM 125 UG/1
125 TABLET ORAL
Status: DISCONTINUED | OUTPATIENT
Start: 2019-01-01 | End: 2019-01-01

## 2019-01-01 RX ORDER — GLYCOPYRROLATE 0.2 MG/ML
0.2 INJECTION INTRAMUSCULAR; INTRAVENOUS
Status: DISCONTINUED | OUTPATIENT
Start: 2019-01-01 | End: 2019-01-01 | Stop reason: HOSPADM

## 2019-01-01 RX ORDER — INSULIN GLARGINE 100 [IU]/ML
2 INJECTION, SOLUTION SUBCUTANEOUS 2 TIMES DAILY
Status: DISCONTINUED | OUTPATIENT
Start: 2019-01-01 | End: 2019-01-01

## 2019-01-01 RX ORDER — ALBUTEROL SULFATE 0.83 MG/ML
2.5 SOLUTION RESPIRATORY (INHALATION)
Status: DISCONTINUED | OUTPATIENT
Start: 2019-01-01 | End: 2019-01-01 | Stop reason: HOSPADM

## 2019-01-01 RX ORDER — GLIPIZIDE 10 MG/1
10 TABLET ORAL 2 TIMES DAILY
COMMUNITY
Start: 2019-01-01

## 2019-01-01 RX ORDER — ACETAMINOPHEN 650 MG/1
650 SUPPOSITORY RECTAL
Status: DISCONTINUED | OUTPATIENT
Start: 2019-01-01 | End: 2019-01-01 | Stop reason: HOSPADM

## 2019-01-01 RX ORDER — AMOXICILLIN 250 MG
2 CAPSULE ORAL
Status: DISCONTINUED | OUTPATIENT
Start: 2019-01-01 | End: 2019-01-01 | Stop reason: HOSPADM

## 2019-01-01 RX ORDER — MAGNESIUM SULFATE 100 %
4 CRYSTALS MISCELLANEOUS AS NEEDED
Status: DISCONTINUED | OUTPATIENT
Start: 2019-01-01 | End: 2019-01-01

## 2019-01-01 RX ORDER — MORPHINE SULFATE 2 MG/ML
2 INJECTION, SOLUTION INTRAMUSCULAR; INTRAVENOUS
Status: DISCONTINUED | OUTPATIENT
Start: 2019-01-01 | End: 2019-01-01

## 2019-01-01 RX ORDER — BUMETANIDE 1 MG/1
2 TABLET ORAL DAILY
Status: DISCONTINUED | OUTPATIENT
Start: 2019-01-01 | End: 2019-01-01

## 2019-01-01 RX ORDER — MORPHINE SULFATE 2 MG/ML
2 INJECTION, SOLUTION INTRAMUSCULAR; INTRAVENOUS
Status: DISCONTINUED | OUTPATIENT
Start: 2019-01-01 | End: 2019-01-01 | Stop reason: HOSPADM

## 2019-01-01 RX ORDER — DEXTROSE MONOHYDRATE AND SODIUM CHLORIDE 5; .45 G/100ML; G/100ML
150 INJECTION, SOLUTION INTRAVENOUS CONTINUOUS
Status: DISCONTINUED | OUTPATIENT
Start: 2019-01-01 | End: 2019-01-01

## 2019-01-01 RX ORDER — MICONAZOLE NITRATE 20 MG/G
CREAM TOPICAL 2 TIMES DAILY
Status: DISCONTINUED | OUTPATIENT
Start: 2019-01-01 | End: 2019-01-01 | Stop reason: HOSPADM

## 2019-01-01 RX ORDER — LEVOTHYROXINE SODIUM 100 UG/1
100 TABLET ORAL
Status: DISCONTINUED | OUTPATIENT
Start: 2019-01-01 | End: 2019-01-01

## 2019-01-01 RX ORDER — MORPHINE SULFATE 20 MG/ML
10 SOLUTION ORAL
Status: DISCONTINUED | OUTPATIENT
Start: 2019-01-01 | End: 2019-01-01 | Stop reason: HOSPADM

## 2019-01-01 RX ORDER — LEVOTHYROXINE SODIUM 150 UG/1
150 TABLET ORAL
Status: DISCONTINUED | OUTPATIENT
Start: 2019-01-01 | End: 2019-01-01

## 2019-01-01 RX ORDER — SODIUM CHLORIDE 9 MG/ML
75 INJECTION, SOLUTION INTRAVENOUS CONTINUOUS
Status: DISCONTINUED | OUTPATIENT
Start: 2019-01-01 | End: 2019-01-01

## 2019-01-01 RX ORDER — LISINOPRIL 5 MG/1
5 TABLET ORAL 2 TIMES DAILY
Qty: 90 TAB | Refills: 3 | COMMUNITY
Start: 2019-01-01

## 2019-01-01 RX ORDER — DOPAMINE HYDROCHLORIDE 320 MG/100ML
0-20 INJECTION, SOLUTION INTRAVENOUS
Status: DISCONTINUED | OUTPATIENT
Start: 2019-01-01 | End: 2019-01-01

## 2019-01-01 RX ORDER — SENNOSIDES 8.8 MG/5ML
5 LIQUID ORAL
Status: DISCONTINUED | OUTPATIENT
Start: 2019-01-01 | End: 2019-01-01 | Stop reason: HOSPADM

## 2019-01-01 RX ORDER — SODIUM CHLORIDE 0.9 % (FLUSH) 0.9 %
5-40 SYRINGE (ML) INJECTION EVERY 8 HOURS
Status: DISCONTINUED | OUTPATIENT
Start: 2019-01-01 | End: 2019-01-01

## 2019-01-01 RX ORDER — DEXTROSE MONOHYDRATE AND SODIUM CHLORIDE 5; .45 G/100ML; G/100ML
50 INJECTION, SOLUTION INTRAVENOUS CONTINUOUS
Status: DISCONTINUED | OUTPATIENT
Start: 2019-01-01 | End: 2019-01-01

## 2019-01-01 RX ORDER — ASPIRIN 81 MG/1
81 TABLET ORAL DAILY
Status: DISCONTINUED | OUTPATIENT
Start: 2019-01-01 | End: 2019-01-01

## 2019-01-01 RX ORDER — SODIUM CHLORIDE 450 MG/100ML
50 INJECTION, SOLUTION INTRAVENOUS CONTINUOUS
Status: DISCONTINUED | OUTPATIENT
Start: 2019-01-01 | End: 2019-01-01

## 2019-01-01 RX ORDER — SODIUM CHLORIDE 0.9 % (FLUSH) 0.9 %
5-40 SYRINGE (ML) INJECTION AS NEEDED
Status: DISCONTINUED | OUTPATIENT
Start: 2019-01-01 | End: 2019-01-01

## 2019-01-01 RX ORDER — FACIAL-BODY WIPES
10 EACH TOPICAL DAILY PRN
Status: DISCONTINUED | OUTPATIENT
Start: 2019-01-01 | End: 2019-01-01 | Stop reason: HOSPADM

## 2019-01-01 RX ORDER — DEXTROSE MONOHYDRATE 50 MG/ML
75 INJECTION, SOLUTION INTRAVENOUS CONTINUOUS
Status: DISCONTINUED | OUTPATIENT
Start: 2019-01-01 | End: 2019-01-01

## 2019-01-01 RX ORDER — MORPHINE SULFATE 10 MG/ML
10 INJECTION, SOLUTION INTRAMUSCULAR; INTRAVENOUS
Status: DISCONTINUED | OUTPATIENT
Start: 2019-01-01 | End: 2019-01-01

## 2019-01-01 RX ORDER — INSULIN LISPRO 100 [IU]/ML
INJECTION, SOLUTION INTRAVENOUS; SUBCUTANEOUS
Status: DISCONTINUED | OUTPATIENT
Start: 2019-01-01 | End: 2019-01-01

## 2019-01-01 RX ORDER — DEXTROSE 50 % IN WATER (D50W) INTRAVENOUS SYRINGE
25-50 AS NEEDED
Status: DISCONTINUED | OUTPATIENT
Start: 2019-01-01 | End: 2019-01-01

## 2019-01-01 RX ORDER — LORAZEPAM 2 MG/ML
1 CONCENTRATE ORAL
Status: DISCONTINUED | OUTPATIENT
Start: 2019-01-01 | End: 2019-01-01

## 2019-01-01 RX ORDER — FUROSEMIDE 10 MG/ML
40 INJECTION INTRAMUSCULAR; INTRAVENOUS
Status: COMPLETED | OUTPATIENT
Start: 2019-01-01 | End: 2019-01-01

## 2019-01-01 RX ORDER — IPRATROPIUM BROMIDE AND ALBUTEROL SULFATE 2.5; .5 MG/3ML; MG/3ML
3 SOLUTION RESPIRATORY (INHALATION)
Status: DISCONTINUED | OUTPATIENT
Start: 2019-01-01 | End: 2019-01-01 | Stop reason: HOSPADM

## 2019-01-01 RX ORDER — LEVOTHYROXINE SODIUM 50 UG/1
100 TABLET ORAL
Status: DISCONTINUED | OUTPATIENT
Start: 2019-01-01 | End: 2019-01-01

## 2019-01-01 RX ORDER — BUMETANIDE 2 MG/1
2 TABLET ORAL DAILY
Qty: 90 TAB | Refills: 3 | COMMUNITY
Start: 2019-01-01

## 2019-01-01 RX ORDER — INFANT FORMULA WITH IRON
POWDER (GRAM) ORAL 2 TIMES DAILY
Status: DISCONTINUED | OUTPATIENT
Start: 2019-01-01 | End: 2019-01-01 | Stop reason: HOSPADM

## 2019-01-01 RX ORDER — LEVOTHYROXINE SODIUM 100 UG/1
200 TABLET ORAL
Status: DISCONTINUED | OUTPATIENT
Start: 2019-01-01 | End: 2019-01-01

## 2019-01-01 RX ORDER — FACIAL-BODY WIPES
10 EACH TOPICAL DAILY PRN
Status: DISCONTINUED | OUTPATIENT
Start: 2019-01-01 | End: 2019-01-01

## 2019-01-01 RX ORDER — ACETAMINOPHEN 325 MG/1
650 TABLET ORAL
Status: DISCONTINUED | OUTPATIENT
Start: 2019-01-01 | End: 2019-01-01 | Stop reason: HOSPADM

## 2019-01-01 RX ORDER — MORPHINE SULFATE 20 MG/ML
10 SOLUTION ORAL
Status: DISCONTINUED | OUTPATIENT
Start: 2019-01-01 | End: 2019-01-01

## 2019-01-01 RX ORDER — ALBUMIN HUMAN 250 G/1000ML
25 SOLUTION INTRAVENOUS EVERY 6 HOURS
Status: DISCONTINUED | OUTPATIENT
Start: 2019-01-01 | End: 2019-01-01

## 2019-01-01 RX ORDER — ALBUMIN HUMAN 250 G/1000ML
12.5 SOLUTION INTRAVENOUS EVERY 6 HOURS
Status: COMPLETED | OUTPATIENT
Start: 2019-01-01 | End: 2019-01-01

## 2019-01-01 RX ORDER — LORAZEPAM 2 MG/ML
1 INJECTION INTRAMUSCULAR
Status: DISCONTINUED | OUTPATIENT
Start: 2019-01-01 | End: 2019-01-01

## 2019-01-01 RX ADMIN — MICONAZOLE NITRATE: 20 CREAM TOPICAL at 08:50

## 2019-01-01 RX ADMIN — SODIUM CHLORIDE 50 ML/HR: 450 INJECTION, SOLUTION INTRAVENOUS at 16:37

## 2019-01-01 RX ADMIN — Medication 10 ML: at 21:54

## 2019-01-01 RX ADMIN — VITAMIN A AND D: 30.8 OINTMENT TOPICAL at 09:00

## 2019-01-01 RX ADMIN — LORAZEPAM 1 MG: 2 INJECTION INTRAMUSCULAR; INTRAVENOUS at 15:21

## 2019-01-01 RX ADMIN — SALINE NASAL SPRAY 2 SPRAY: 1.5 SOLUTION NASAL at 19:25

## 2019-01-01 RX ADMIN — SALINE NASAL SPRAY 2 SPRAY: 1.5 SOLUTION NASAL at 14:21

## 2019-01-01 RX ADMIN — MICONAZOLE NITRATE: 20 CREAM TOPICAL at 19:19

## 2019-01-01 RX ADMIN — SALINE NASAL SPRAY 2 SPRAY: 1.5 SOLUTION NASAL at 06:45

## 2019-01-01 RX ADMIN — SODIUM CHLORIDE 75 ML/HR: 900 INJECTION, SOLUTION INTRAVENOUS at 23:17

## 2019-01-01 RX ADMIN — ASPIRIN 81 MG: 81 TABLET, COATED ORAL at 09:25

## 2019-01-01 RX ADMIN — DEXTROSE MONOHYDRATE 25 G: 500 INJECTION PARENTERAL at 12:15

## 2019-01-01 RX ADMIN — VITAMIN A AND D: 30.8 OINTMENT TOPICAL at 18:24

## 2019-01-01 RX ADMIN — CASTOR OIL AND BALSAM, PERU: 788; 87 OINTMENT TOPICAL at 19:26

## 2019-01-01 RX ADMIN — Medication 10 ML: at 14:11

## 2019-01-01 RX ADMIN — Medication 10 ML: at 05:48

## 2019-01-01 RX ADMIN — VITAMIN A AND D: 30.8 OINTMENT TOPICAL at 18:06

## 2019-01-01 RX ADMIN — SALINE NASAL SPRAY 2 SPRAY: 1.5 SOLUTION NASAL at 17:27

## 2019-01-01 RX ADMIN — LORAZEPAM 1 MG: 2 SOLUTION, CONCENTRATE ORAL at 02:30

## 2019-01-01 RX ADMIN — SALINE NASAL SPRAY 2 SPRAY: 1.5 SOLUTION NASAL at 12:58

## 2019-01-01 RX ADMIN — SODIUM CHLORIDE 50 ML/HR: 450 INJECTION, SOLUTION INTRAVENOUS at 12:00

## 2019-01-01 RX ADMIN — MICONAZOLE NITRATE: 20 CREAM TOPICAL at 09:25

## 2019-01-01 RX ADMIN — Medication 10 ML: at 15:12

## 2019-01-01 RX ADMIN — LEVOTHYROXINE SODIUM 100 MCG: 50 TABLET ORAL at 06:58

## 2019-01-01 RX ADMIN — LEVOTHYROXINE SODIUM 100 MCG: 50 TABLET ORAL at 06:43

## 2019-01-01 RX ADMIN — MICONAZOLE NITRATE: 20 CREAM TOPICAL at 18:07

## 2019-01-01 RX ADMIN — CALCIUM GLUCONATE 2 G: 98 INJECTION, SOLUTION INTRAVENOUS at 12:00

## 2019-01-01 RX ADMIN — CASTOR OIL AND BALSAM, PERU: 788; 87 OINTMENT TOPICAL at 18:23

## 2019-01-01 RX ADMIN — GLYCOPYRROLATE 0.2 MG: 0.2 INJECTION INTRAMUSCULAR; INTRAVENOUS at 22:22

## 2019-01-01 RX ADMIN — DEXTROSE MONOHYDRATE AND SODIUM CHLORIDE 50 ML/HR: 5; .45 INJECTION, SOLUTION INTRAVENOUS at 23:52

## 2019-01-01 RX ADMIN — VITAMIN A AND D: 30.8 OINTMENT TOPICAL at 17:10

## 2019-01-01 RX ADMIN — Medication 10 ML: at 13:00

## 2019-01-01 RX ADMIN — MICONAZOLE NITRATE: 20 CREAM TOPICAL at 17:10

## 2019-01-01 RX ADMIN — MICONAZOLE NITRATE: 20 CREAM TOPICAL at 19:26

## 2019-01-01 RX ADMIN — Medication 10 ML: at 06:43

## 2019-01-01 RX ADMIN — Medication 10 ML: at 22:00

## 2019-01-01 RX ADMIN — FUROSEMIDE 40 MG: 10 INJECTION, SOLUTION INTRAMUSCULAR; INTRAVENOUS at 21:24

## 2019-01-01 RX ADMIN — CASTOR OIL AND BALSAM, PERU: 788; 87 OINTMENT TOPICAL at 10:19

## 2019-01-01 RX ADMIN — SALINE NASAL SPRAY 2 SPRAY: 1.5 SOLUTION NASAL at 13:15

## 2019-01-01 RX ADMIN — VITAMIN A AND D: 30.8 OINTMENT TOPICAL at 09:11

## 2019-01-01 RX ADMIN — DEXTROSE MONOHYDRATE AND SODIUM CHLORIDE 50 ML/HR: 5; .45 INJECTION, SOLUTION INTRAVENOUS at 12:37

## 2019-01-01 RX ADMIN — MICONAZOLE NITRATE: 20 CREAM TOPICAL at 14:19

## 2019-01-01 RX ADMIN — ALBUMIN (HUMAN) 12.5 G: 0.25 INJECTION, SOLUTION INTRAVENOUS at 18:05

## 2019-01-01 RX ADMIN — MICONAZOLE NITRATE: 20 CREAM TOPICAL at 08:47

## 2019-01-01 RX ADMIN — DEXTROSE MONOHYDRATE 25 G: 500 INJECTION PARENTERAL at 12:49

## 2019-01-01 RX ADMIN — CASTOR OIL AND BALSAM, PERU: 788; 87 OINTMENT TOPICAL at 09:39

## 2019-01-01 RX ADMIN — SALINE NASAL SPRAY 2 SPRAY: 1.5 SOLUTION NASAL at 07:21

## 2019-01-01 RX ADMIN — LORAZEPAM 1 MG: 2 INJECTION INTRAMUSCULAR; INTRAVENOUS at 22:46

## 2019-01-01 RX ADMIN — CASTOR OIL AND BALSAM, PERU: 788; 87 OINTMENT TOPICAL at 17:24

## 2019-01-01 RX ADMIN — CASTOR OIL AND BALSAM, PERU: 788; 87 OINTMENT TOPICAL at 18:06

## 2019-01-01 RX ADMIN — ALBUMIN (HUMAN) 12.5 G: 0.25 INJECTION, SOLUTION INTRAVENOUS at 00:45

## 2019-01-01 RX ADMIN — Medication 10 ML: at 03:42

## 2019-01-01 RX ADMIN — MORPHINE SULFATE 10 MG: 20 SOLUTION ORAL at 05:27

## 2019-01-01 RX ADMIN — SALINE NASAL SPRAY 2 SPRAY: 1.5 SOLUTION NASAL at 05:04

## 2019-01-01 RX ADMIN — CASTOR OIL AND BALSAM, PERU: 788; 87 OINTMENT TOPICAL at 17:10

## 2019-01-01 RX ADMIN — MORPHINE SULFATE 10 MG: 20 SOLUTION ORAL at 11:45

## 2019-01-01 RX ADMIN — SALINE NASAL SPRAY 2 SPRAY: 1.5 SOLUTION NASAL at 17:11

## 2019-01-01 RX ADMIN — VITAMIN A AND D: 30.8 OINTMENT TOPICAL at 18:27

## 2019-01-01 RX ADMIN — ALBUMIN (HUMAN) 12.5 G: 0.25 INJECTION, SOLUTION INTRAVENOUS at 05:46

## 2019-01-01 RX ADMIN — SALINE NASAL SPRAY 2 SPRAY: 1.5 SOLUTION NASAL at 00:52

## 2019-01-01 RX ADMIN — SODIUM CHLORIDE 150 ML/HR: 900 INJECTION, SOLUTION INTRAVENOUS at 15:58

## 2019-01-01 RX ADMIN — MICONAZOLE NITRATE: 20 CREAM TOPICAL at 18:26

## 2019-01-01 RX ADMIN — VITAMIN A AND D: 30.8 OINTMENT TOPICAL at 14:20

## 2019-01-01 RX ADMIN — CASTOR OIL AND BALSAM, PERU: 788; 87 OINTMENT TOPICAL at 18:25

## 2019-01-01 RX ADMIN — SALINE NASAL SPRAY 2 SPRAY: 1.5 SOLUTION NASAL at 14:38

## 2019-01-01 RX ADMIN — ALBUMIN (HUMAN) 12.5 G: 0.25 INJECTION, SOLUTION INTRAVENOUS at 13:18

## 2019-01-01 RX ADMIN — ASPIRIN 81 MG: 81 TABLET, COATED ORAL at 09:39

## 2019-01-01 RX ADMIN — LEVOTHYROXINE SODIUM 100 MCG: 50 TABLET ORAL at 09:39

## 2019-01-01 RX ADMIN — SALINE NASAL SPRAY 2 SPRAY: 1.5 SOLUTION NASAL at 03:57

## 2019-01-01 RX ADMIN — DEXTROSE MONOHYDRATE AND SODIUM CHLORIDE 50 ML/HR: 5; .45 INJECTION, SOLUTION INTRAVENOUS at 20:41

## 2019-01-01 RX ADMIN — VITAMIN A AND D: 30.8 OINTMENT TOPICAL at 09:39

## 2019-01-01 RX ADMIN — VITAMIN A AND D: 30.8 OINTMENT TOPICAL at 19:19

## 2019-01-01 RX ADMIN — Medication 10 ML: at 06:29

## 2019-01-01 RX ADMIN — CASTOR OIL AND BALSAM, PERU: 788; 87 OINTMENT TOPICAL at 14:20

## 2019-01-01 RX ADMIN — SALINE NASAL SPRAY 2 SPRAY: 1.5 SOLUTION NASAL at 00:54

## 2019-01-01 RX ADMIN — VITAMIN A AND D: 30.8 OINTMENT TOPICAL at 08:47

## 2019-01-01 RX ADMIN — VITAMIN A AND D: 30.8 OINTMENT TOPICAL at 09:26

## 2019-01-01 RX ADMIN — VITAMIN A AND D: 30.8 OINTMENT TOPICAL at 19:26

## 2019-01-01 RX ADMIN — LORAZEPAM 1 MG: 2 SOLUTION, CONCENTRATE ORAL at 22:22

## 2019-01-01 RX ADMIN — Medication 10 ML: at 21:51

## 2019-01-01 RX ADMIN — MICONAZOLE NITRATE: 20 CREAM TOPICAL at 17:24

## 2019-01-01 RX ADMIN — SALINE NASAL SPRAY 2 SPRAY: 1.5 SOLUTION NASAL at 06:18

## 2019-01-01 RX ADMIN — ASPIRIN 81 MG: 81 TABLET, COATED ORAL at 08:51

## 2019-01-01 RX ADMIN — MICONAZOLE NITRATE: 20 CREAM TOPICAL at 09:10

## 2019-01-01 RX ADMIN — MORPHINE SULFATE 2 MG: 2 INJECTION, SOLUTION INTRAMUSCULAR; INTRAVENOUS at 22:47

## 2019-01-01 RX ADMIN — INSULIN LISPRO 2 UNITS: 100 INJECTION, SOLUTION INTRAVENOUS; SUBCUTANEOUS at 06:43

## 2019-01-01 RX ADMIN — MICONAZOLE NITRATE: 20 CREAM TOPICAL at 09:00

## 2019-01-01 RX ADMIN — SALINE NASAL SPRAY 2 SPRAY: 1.5 SOLUTION NASAL at 23:33

## 2019-01-01 RX ADMIN — MORPHINE SULFATE 10 MG: 20 SOLUTION ORAL at 22:22

## 2019-01-01 RX ADMIN — DEXTROSE MONOHYDRATE 25 G: 500 INJECTION PARENTERAL at 23:22

## 2019-01-01 RX ADMIN — SALINE NASAL SPRAY 2 SPRAY: 1.5 SOLUTION NASAL at 05:13

## 2019-01-01 RX ADMIN — Medication 10 ML: at 06:03

## 2019-01-01 RX ADMIN — Medication 10 ML: at 06:58

## 2019-01-01 RX ADMIN — DEXTROSE MONOHYDRATE 25 G: 500 INJECTION PARENTERAL at 22:02

## 2019-01-01 RX ADMIN — DEXTROSE MONOHYDRATE 25 G: 500 INJECTION PARENTERAL at 16:29

## 2019-01-01 RX ADMIN — INSULIN LISPRO 2 UNITS: 100 INJECTION, SOLUTION INTRAVENOUS; SUBCUTANEOUS at 17:23

## 2019-01-01 RX ADMIN — SALINE NASAL SPRAY 2 SPRAY: 1.5 SOLUTION NASAL at 15:45

## 2019-01-01 RX ADMIN — SALINE NASAL SPRAY 2 SPRAY: 1.5 SOLUTION NASAL at 05:16

## 2019-01-01 RX ADMIN — MORPHINE SULFATE 10 MG: 20 SOLUTION ORAL at 16:48

## 2019-01-01 RX ADMIN — BUMETANIDE 2 MG: 1 TABLET ORAL at 08:51

## 2019-01-01 RX ADMIN — MORPHINE SULFATE 10 MG: 20 SOLUTION ORAL at 02:30

## 2019-01-01 RX ADMIN — DOPAMINE HYDROCHLORIDE IN DEXTROSE 6 MCG/KG/MIN: 3.2 INJECTION, SOLUTION INTRAVENOUS at 15:09

## 2019-01-01 RX ADMIN — Medication 10 ML: at 06:44

## 2019-01-01 RX ADMIN — CASTOR OIL AND BALSAM, PERU: 788; 87 OINTMENT TOPICAL at 08:49

## 2019-01-01 RX ADMIN — MICONAZOLE NITRATE: 20 CREAM TOPICAL at 09:39

## 2019-01-01 RX ADMIN — VITAMIN A AND D: 30.8 OINTMENT TOPICAL at 17:25

## 2019-01-01 RX ADMIN — MORPHINE SULFATE 10 MG: 20 SOLUTION ORAL at 14:17

## 2019-01-01 RX ADMIN — LEVOTHYROXINE SODIUM 150 MCG: 150 TABLET ORAL at 08:25

## 2019-01-01 RX ADMIN — Medication 10 ML: at 21:41

## 2019-01-01 RX ADMIN — CASTOR OIL AND BALSAM, PERU: 788; 87 OINTMENT TOPICAL at 08:47

## 2019-01-01 RX ADMIN — MORPHINE SULFATE 2 MG: 2 INJECTION, SOLUTION INTRAMUSCULAR; INTRAVENOUS at 03:18

## 2019-01-01 RX ADMIN — ASPIRIN 81 MG: 81 TABLET, COATED ORAL at 09:08

## 2019-01-01 RX ADMIN — SALINE NASAL SPRAY 2 SPRAY: 1.5 SOLUTION NASAL at 07:10

## 2019-01-01 RX ADMIN — SALINE NASAL SPRAY 2 SPRAY: 1.5 SOLUTION NASAL at 19:20

## 2019-01-01 RX ADMIN — SALINE NASAL SPRAY 2 SPRAY: 1.5 SOLUTION NASAL at 21:51

## 2019-01-01 RX ADMIN — LORAZEPAM 1 MG: 2 SOLUTION, CONCENTRATE ORAL at 05:27

## 2019-01-01 RX ADMIN — INSULIN LISPRO 2 UNITS: 100 INJECTION, SOLUTION INTRAVENOUS; SUBCUTANEOUS at 12:14

## 2019-01-01 RX ADMIN — MORPHINE SULFATE 2 MG: 2 INJECTION, SOLUTION INTRAMUSCULAR; INTRAVENOUS at 15:22

## 2019-01-01 RX ADMIN — SALINE NASAL SPRAY 2 SPRAY: 1.5 SOLUTION NASAL at 16:48

## 2019-01-01 RX ADMIN — Medication 10 ML: at 14:00

## 2019-01-01 RX ADMIN — VITAMIN A AND D: 30.8 OINTMENT TOPICAL at 08:49

## 2019-01-01 RX ADMIN — CASTOR OIL AND BALSAM, PERU: 788; 87 OINTMENT TOPICAL at 09:09

## 2019-01-01 RX ADMIN — MICONAZOLE NITRATE: 20 CREAM TOPICAL at 18:24

## 2019-01-01 RX ADMIN — GLYCOPYRROLATE 0.2 MG: 0.2 INJECTION INTRAMUSCULAR; INTRAVENOUS at 22:47

## 2019-01-01 RX ADMIN — Medication 10 ML: at 13:44

## 2019-01-01 RX ADMIN — CASTOR OIL AND BALSAM, PERU: 788; 87 OINTMENT TOPICAL at 09:25

## 2019-01-01 RX ADMIN — SALINE NASAL SPRAY 2 SPRAY: 1.5 SOLUTION NASAL at 21:54

## 2019-01-10 PROBLEM — E11.21 TYPE 2 DIABETES WITH NEPHROPATHY (HCC): Status: ACTIVE | Noted: 2019-01-01

## 2019-01-10 NOTE — PROGRESS NOTES
Visit Vitals  /60 (BP 1 Location: Left arm, BP Patient Position: Sitting)   Pulse 74   Resp 16   Ht 5' 1\" (1.549 m)   Wt 150 lb (68 kg)   SpO2 90% Comment: 2 liters of oxygen   BMI 28.34 kg/m²

## 2019-01-10 NOTE — PROGRESS NOTES
Becki Guerrero     1935       Jodi Smith MD, [facility]  Date of Visit-1/10/2019   PCP is Mesfin Batres MD   [facility] [place]  [facility]  HPI:  Becki Guerrero is a 81 y.o. female   11 mo f/u of CHF with RV dysfunction. Ms. Lena Mendoza has a history of congestive heart failure with RV dysfunction due to lung disease. An echocardiogram in February, 2018 showed an EF of 45-50% with mild LVH and dilated right ventricle and PA pressure of 56. She has known left bundle branch block and moderate kidney disease with creatinine of 1.6 at that time. She'd gone to her endocrinologist when admitted due to shortness of breath. I saw her in March. She appeared compensated for combined diastolic and systolic failure, had significant COPD. She was continued on Coreg and 2 mg of Bumex at that time. She was then admitted to [facility] May 17th through May 22nd with acute on chronic hypoxic respiratory failure thought to be due to COPD. An echocardiogram was done that showed EF confirmed to be normal at 55-60%, her creatinine was 1.7. She was seen during that hospital stay by Dr. Giuseppe Medrano and myself. Looks like a lot of this was related to her tank was off and she didn't have her tank when she went to the endocrinologist's office on May 17th with edema. We felt her shortness of breath was due to limited respiratory reserve with severe COPD and chronic cor pulmonale with a normal EF. Had been dizzy with Digoxin in the past and that was stopped and ACE inhibitor. The main treatment seemed to be oxygen and diuretic. Pt is experiencing LE edema and SOB again along with dizziness and low BP. Pt overall feels quite awful. Pt takes lisinopril in the morning. Pt take 1 mg of Bumex a day, 6 days a week except on the days that she goes out. Pt has seen endocrinology since the last visit.       Denies chest pain, syncope, has no tachycardia, palpitations or sense of arrhythmia. Assessment/Plan:     1. Cor pulmonale, chronic (HCC)  Increasing SOB and edema. COPD, severe ([address]  2. Pulmonary hypertension ([address]  Likely worse. 3. Chronic combined systolic and diastolic CHF (congestive heart failure) (HCC)  Will increase Bumex to full 2 mg tab daily. With her dizziness will change lisinopril in the evening should get BNP and BMP today and return to clinic in two weeks with an echo. 4. Essential hypertension-stable  5 . 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 Patient Instructions   Please start taking your lisinopril at night. Please start taking a whole tab of Bumex (2mg). Please get your blood work done today. You will be scheduled for an ECHO followed by an office visit with Dr. Elissa Morillo.         Impression:   1. Cor pulmonale, chronic ([address]    2. Pulmonary hypertension ([address]    3. Chronic combined systolic and diastolic CHF (congestive heart failure) ([address]    4. COPD, severe ([address]    5. Essential hypertension    6. Atherosclerosis of native artery of both lower extremities with bilateral ulceration, unspecified ulceration site       Cardiac History:   Cor pulmonale  June 2017-   ECHO 2017 RV enlargement lower RVEF with normal LVEF 55-60%  ABIs 2017 WNL  NU 7-13-17 =Lexiscan pharmacologic stress test shows normal perfusion with an EF of 64 %. Admit   ---Echo 2/7/18 EF 45-50% with mild LVH, dilated RV with moderate RV failure, PA pressure of 56. creatine 1.64, pt was discharged on 2/9/18 with pulmonary edema mixed diastolic/systolic dysfunction and acute kidney injury. -dehydration from diarrhea she had a foot ulcer with cellulitis   --carotid dopplers with minimal disease. ROS-except as noted above. . A complete cardiac and respiratory are reviewed and negative except as above ; Resp-denies wheezing  or productive cough,.  Const- No unusual weight loss or fever; Neuro-no recent seizure or CVA ; GI- No BRBPR, abdom pain, bloating ; - no  hematuria   see supplement sheet, initialed and to be scanned by staff  Past Medical History:   Diagnosis Date    MORGAN (acute kidney injury) ([facility] 3/25/2018    Arthritis     [facility] obstructive pulmonary disease ([facility] 7/23/2017    Cor pulmonale ([facility] 8/29/2017    Diabetes ([facility]     Elevated brain natriuretic peptide (BNP) level 7/23/2017    Essential hypertension 7/23/2017    Shortness of breath     Thyroid disease       Social Hx= reports that she quit smoking about 19 years ago. she has never used smokeless tobacco. She reports that she does not drink alcohol or use drugs. Exam and Labs:  /60 (BP 1 Location: Left arm, BP Patient Position: Sitting)   Pulse 74   Resp 16   Ht 5' 1\" (1.549 m)   Wt 150 lb (68 kg)   SpO2 90% Comment: 2 liters of oxygen  BMI 28.34 kg/m² Constitutional:  NAD, comfortable  Head: NC,AT. Eyes: No scleral icterus. Neck:  Neck supple. No JVD present. Throat: moist mucous membranes. Chest: Effort normal & normal respiratory excursion . Neurological: alert, conversant and oriented . Skin: Skin is not cold. No obvious systemic rash noted. Not diaphoretic. No erythema. Psychiatric:  Grossly normal mood and affect. Behavior appears normal. Extremities:  no clubbing or cyanosis. Abdomen: non distended    Lungs:breath sounds normal. No stridor. distress, wheezes or  Rales. Limited air movement  Heart:distant heart sound normal rate, regular rhythm, normal S1, S2, no murmurs, rubs, clicks or gallops , PMI non displaced. Edema: Edema is 3+ to the knee.   No results found for: CHOL, CHOLX, CHLST, CHOLV, HDL, LDL, LDLC, DLDLP, TGLX, TRIGL, TRIGP, CHHD, CHHDX  Lab Results   Component Value Date/Time    Sodium 135 (L) 05/22/2018 01:05 AM    Potassium 4.4 05/22/2018 01:05 AM    Chloride 98 05/22/2018 01:05 AM    CO2 29 05/22/2018 01:05 AM    Anion gap 8 05/22/2018 01:05 AM    Glucose 150 (H) 05/22/2018 01:05 AM    BUN 51 (H) 05/22/2018 01:05 AM    Creatinine 2.08 (H) 05/22/2018 01:05 AM    BUN/Creatinine ratio 25 (H) 05/22/2018 01:05 AM    GFR est AA 28 (L) 05/22/2018 01:05 AM    GFR est non-AA 23 (L) 05/22/2018 01:05 AM    Calcium 8.4 (L) 05/22/2018 01:05 AM      Wt Readings from Last 3 Encounters:   01/10/19 150 lb (68 kg)   08/01/18 134 lb 12.8 oz (61.1 kg)   07/23/18 135 lb (61.2 kg)      BP Readings from Last 3 Encounters:   01/10/19 110/60   08/01/18 132/62   07/27/18 140/90      Current Outpatient Medications   Medication Sig    glipiZIDE (GLUCOTROL) 10 mg tablet Take 10 mg by mouth two (2) times a day.  lisinopril (PRINIVIL, ZESTRIL) 5 mg tablet Take 5 mg by mouth daily.  OXYGEN-AIR DELIVERY SYSTEMS 2 L/min by Nasal route continuous.  insulin glargine (LANTUS U-100 INSULIN) 100 unit/mL injection 4 Units by SubCUTAneous route two (2) times a day.  levothyroxine (SYNTHROID) 100 mcg tablet Take 100 mcg by mouth six (6) days a week. Monday through Saturday    levothyroxine (SYNTHROID) 100 mcg tablet Take 200 mcg by mouth every Sunday.  vit A/vit C/vit E/zinc/copper (PRESERVISION AREDS PO) Take 1 Tab by mouth daily.  carvedilol (COREG) 6.25 mg tablet Take  by mouth two (2) times daily (with meals).  bumetanide (BUMEX) 2 mg tablet Take 1 mg by mouth daily.  aspirin delayed-release 81 mg tablet Take  by mouth daily.  (No Medication Selected) 100 Units by SubCUTAneous route Before breakfast, lunch, and dinner. sliding scale  blood sugar 200 to 249, give 2 units  bllod sugar 250 to 299, give 3 units  blood sugar 300 to 349, give 4 units  blood sugar is greater than 349, call md     No current facility-administered medications for this visit. Impression see above.       Written by Beverley Danielson, as dictated by Karel Harrington MD.

## 2019-01-10 NOTE — PATIENT INSTRUCTIONS
Please start taking your lisinopril at night. Please start taking a whole tab of Bumex (2mg). Please get your blood work done today.  You will be scheduled for an ECHO followed by an office visit with Dr. Donnell De La Cruz.

## 2019-01-17 PROBLEM — I50.9 ACUTE EXACERBATION OF CHF (CONGESTIVE HEART FAILURE) (HCC): Status: ACTIVE | Noted: 2019-01-01

## 2019-01-17 PROBLEM — R26.2 INABILITY TO WALK: Status: ACTIVE | Noted: 2019-01-01

## 2019-01-17 PROBLEM — R09.02 HYPOXIA: Status: ACTIVE | Noted: 2019-01-01

## 2019-01-17 PROBLEM — N17.9 ACUTE-ON-CHRONIC KIDNEY INJURY (HCC): Status: ACTIVE | Noted: 2019-01-01

## 2019-01-17 PROBLEM — N18.9 ACUTE-ON-CHRONIC KIDNEY INJURY (HCC): Status: ACTIVE | Noted: 2019-01-01

## 2019-01-17 PROBLEM — R06.02 SOB (SHORTNESS OF BREATH): Status: ACTIVE | Noted: 2019-01-01

## 2019-01-17 NOTE — ED TRIAGE NOTES
Pt arrives from EMS with complaints of generalized weakness. Pt sister tried calling her and became worried when she didn't answer. Sister went to check on her and found her unable to get to the phone due to weakness and SOB. Denies pain at this time and states, \" I feel weak and SOB even with my oxygen\" Pt wears 2 lpm baseline

## 2019-01-17 NOTE — ED PROVIDER NOTES
81 y.o. female with past medical history significant for diabetes, COPD, thyroid disease, HTN, MORGAN, cor pulmonale, elevated brain natriuretic peptide level, and arhtiris who presents from home with chief complaint of SOB. The pt states that she has been experiencing worsening SOB, leg swelling, and \"waves of dizziness\" over the past few weeks. Pt's sister states that she is concerned that the pt has been experiencing increased difficulty ambulating with her Rolator and was unable to walk to answer the door at her home today. Pt reports that she fell this morning but denies any pain or injury. Pt uses 2L O2 at baseline. She saw Dr. Ismael Leo 9 days ago for the same sx who changed her medications. Pt states that she is compliant with her medications. Pt denies fever or chills. There are no other acute medical concerns at this time. Chart Review: Pt saw Dr. María Bernal 1/10/19 for SOB, dizziness, and feet swelling. She was instructed to start taking her Lisinopril at night and a whole tab of Bumex (2 mg). She was sent for blood work and scheduled for an ECHO with f/u by Dr. María Bernal.  
 
Social hx: former tobacco smoker (quit 19 years ago); no EtOH use PCP: Jesús Holloway MD 
 
Note written by Sushant Araiza, as dictated by Cornell Montgomery MD 5:00 PM 
 
 
The history is provided by the patient. No  was used. Past Medical History:  
Diagnosis Date  MORGAN (acute kidney injury) ([address] 3/25/2018  Arthritis  Chronic obstructive pulmonary disease ([address] 7/23/2017  Cor pulmonale ([address] 8/29/2017  Diabetes ([address]  Elevated brain natriuretic peptide (BNP) level 7/23/2017  Essential hypertension 7/23/2017  Shortness of breath  Thyroid disease Past Surgical History:  
Procedure Laterality Date  HX CATARACT REMOVAL Bilateral   
 
   
Family History:  
Problem Relation Age of Onset  Diabetes Brother Social History Socioeconomic History  Marital status: SINGLE Spouse name: Not on file  Number of children: Not on file  Years of education: Not on file  Highest education level: Not on file Social Needs  Financial resource strain: Not on file  Food insecurity - worry: Not on file  Food insecurity - inability: Not on file  Transportation needs - medical: Not on file  Transportation needs - non-medical: Not on file Occupational History  Not on file Tobacco Use  Smoking status: Former Smoker Last attempt to quit: 2000 Years since quittin.0  Smokeless tobacco: Never Used Substance and Sexual Activity  Alcohol use: No  
 Drug use: No  
 Sexual activity: Not on file Other Topics Concern  Not on file Social History Narrative  Not on file ALLERGIES: Ace inhibitors and Other medication Review of Systems Constitutional: Negative for appetite change, chills and fever. HENT: Negative for rhinorrhea, sore throat and trouble swallowing. Eyes: Negative for photophobia. Respiratory: Positive for shortness of breath. Negative for cough. Cardiovascular: Positive for leg swelling. Negative for chest pain and palpitations. Gastrointestinal: Negative for abdominal pain, nausea and vomiting. Genitourinary: Negative for dysuria, frequency and hematuria. Musculoskeletal: Negative for arthralgias. Neurological: Positive for dizziness and weakness (generalized). Negative for syncope. Psychiatric/Behavioral: Negative for behavioral problems. The patient is not nervous/anxious. All other systems reviewed and are negative. Vitals:  
 19 1623 19 1626 19 1705 BP:  112/73 Pulse:  (!) 49 Resp:  18 Temp:  98 °F (36.7 °C) SpO2:  91% 90% Weight: 68 kg (150 lb) Height: 5' 1\" (1.549 m) Physical Exam  
Constitutional: She appears well-developed and well-nourished. HENT:  
Head: Normocephalic and atraumatic. Mouth/Throat: Oropharynx is clear and moist.  
Eyes: EOM are normal. Pupils are equal, round, and reactive to light. Neck: Normal range of motion. Neck supple. Cardiovascular: Normal rate, regular rhythm, normal heart sounds and intact distal pulses. Exam reveals no gallop and no friction rub. No murmur heard. Pulmonary/Chest: Effort normal. No respiratory distress. She has no wheezes. She has no rales. Abdominal: Soft. There is no tenderness. There is no rebound. Musculoskeletal: Normal range of motion. She exhibits edema (2/4 pitting edema). She exhibits no tenderness. Neurological: She is alert. No cranial nerve deficit. Motor; symmetric Skin: No erythema. Psychiatric: She has a normal mood and affect. Her behavior is normal.  
Nursing note and vitals reviewed. Note written by Sushant Garcia, as dictated by Karl Kirby MD 5:00 PM  
 
MDM Procedures CONSULT NOTE: 
10:09 PM Karl Kirby MD communicated with Dr. Jyothi Rich, Consult for Hospitalist via [facility]. Discussed available diagnostic tests and clinical findings. He will admit the pt. 
 
10:21 PM 
Patient is being admitted to the hospital.  The results of their tests and reasons for their admission have been discussed with them and/or available family. They convey agreement and understanding for the need to be admitted and for their admission diagnosis. Consultation will be made now with the inpatient physician for hospitalization.

## 2019-01-18 NOTE — H&P
[address] HISTORY AND PHYSICAL [facility]ott 
MR#: [id number] : 1935 ACCOUNT #: [de-identified] ADMIT DATE: 2019 CHIEF COMPLAINT:  Shortness of breath. HISTORY OF PRESENT ILLNESS:  An 81-year-old white female with past medical history of chronic kidney disease, arthritis, COPD, cor pulmonale, CHF, hypertension, type 2 diabetes mellitus, hypothyroidism, gait abnormality presented to the emergency department from home via EMS with chief complaint of shortness of breath. No generalized weakness. The patient is a limited historian, majority of history was obtained from review of ED and electronic medical record. Per the collective reports, the patient's sister tried to call the patient, became worried when she had not received an answer. She reportedly went to check on the patient and found that the patient was unable to answer the phone. Patient reports that she fell. She denies any loss of consciousness, or head and neck trauma. She reportedly had had dizziness and weakness that was worsened compared to her baseline. Baseline, she uses a roller walker. She reportedly now is unable to walk due to generalized weakness and debility. She notes that she lives alone. Shortness of breath, noted as constant, has worsened compared to baseline. Baseline, she uses 2 liters of oxygen via nasal cannula. She is not reporting any chest pain, abdominal pain, nausea, vomiting, diarrhea, melena, dysuria, hematuria, calf pain, fever, chills, rash, syncope, loss of consciousness, new onset numbness, paresthesia, slurred speech, facial droop, headache, neck pain or back pain. On arrival in the emergency department, initial recorded vital signs, blood pressure 112/73, heart rate of 49, respiratory rate 18, O2 saturation 91% on room air. The patient was placed on 2 liters O2 nasal cannula.   Workup included chest x-ray, PA and lateral, showed cardiomegaly, mild interstitial edema and small pleural effusions. Labs showed elevated proBNP of 18,553. She had elevated BUN of 75, creatinine 2.25  (compared to last creatinine 1.89 on 01/10/2019). Per the ED, the patient was given Lasix 40 mg IV x1 dose. The patient is on Bumex at home. She is now seen for admission to the hospitalist service for continued evaluation and treatment. Still complains of shortness of breath, this remains unchanged. PAST MEDICAL HISTORY: 
1. Chronic kidney disease. 2.  Arthritis. 3.  COPD. 4.  Cor pulmonale. 5.  CHF -- diastolic dysfunction. Last 2D echocardiogram 05/18/2018 showed normal left ventricular systolic ejection fraction of 55% to 60%. 6.  Hypertension. 7.  Hypothyroidism. 8.  Type 2 diabetes mellitus. 9.  Gait abnormality. PAST SURGICAL HISTORY:  Status post bilateral cataract extraction, intraocular lens implant. MEDICATIONS:  Complete medication list reviewed and noted on chart records. Taking Last Dose Start Date End Date Provider  
 aspirin delayed-release 81 mg tablet  1/17/2019  --  --  Provider, Historical  
 Take 81 mg by mouth daily. bumetanide (BUMEX) 2 mg tablet  1/17/2019  01/10/19  -- Erick Chu MD  
 Take 1 Tab by mouth daily. carvedilol (COREG) 6.25 mg tablet  1/17/2019  --  --  Provider, Historical  
 Take 6.25 mg by mouth two (2) times daily (with meals). glipiZIDE (GLUCOTROL) 10 mg tablet  1/17/2019 01/03/19  --  Provider, Historical  
 Take 10 mg by mouth two (2) times a day. insulin glargine (LANTUS U-100 INSULIN) 100 unit/mL injection  1/17/2019  --  --  Provider, Historical  
 2 Units by SubCUTAneous route daily. levothyroxine (SYNTHROID) 100 mcg tablet  1/17/2019  --  --  Provider, Historical  
 Take 100 mcg by mouth six (6) days a week. Monday through Saturday  
 levothyroxine (SYNTHROID) 100 mcg tablet    --  --  Provider, Historical  
 Take 200 mcg by mouth every Sunday. lisinopril (PRINIVIL, ZESTRIL) 5 mg tablet  1/17/2019  01/10/19  --  Jodi Broderick MD  
 Take 5 mg by mouth two (2) times a day. vit A/vit C/vit E/zinc/copper (PRESERVISION AREDS PO)  1/17/2019  --  --  Cassandra Gomez  
 Take 1 Tab by mouth daily. ALLERGIES:  ACE INHIBITORS. SOCIAL HISTORY:  Former smoker of cigarettes, fully quit in 2000. No reports of alcohol. FAMILY HISTORY:  Diabetes, brother. REVIEW OF SYSTEMS:  Pertinent positives were as noted in HPI. All other systems are reviewed and negative. PHYSICAL EXAMINATION: 
VITAL SIGNS:  Temperature 98.0 degrees Fahrenheit, blood pressure 113/49, heart rate 56, respiratory rate of 20, O2 saturation is 91% on 2 liters  via nasal cannula. Recorded weight 150 pounds (68 kg). Reported height of 5 feet 1 inches tall. GENERAL:  Elderly female in no acute respiratory distress while at rest. 
PSYCHIATRIC:  The patient is awake, alert and fully oriented x3. NEUROLOGIC:  GCS of 15. Moves extremities x4 with generalized weakness. Sensation grossly intact without slurred speech or facial droop. HEENT:  Normocephalic, atraumatic. PERRLA. EOMs intact. Sclerae anicteric. Conjunctivae clear. Nares are patent. Oropharynx is clear. Tongue is midline, not edematous. Oral mucosa:  Dry with thick brownish mucous secretion. NECK:  Supple, without lymphadenopathy, JVD, carotid bruits or thyromegaly. No stridor. LYMPHATIC:  Negative for cervical, supraclavicular adenopathy. RESPIRATORY:  Lungs scattered rhonchi bilaterally. CARDIOVASCULAR:  Heart bradycardic, regular rhythm. No murmurs, rubs or gallops. GASTROINTESTINAL:  Abdomen is soft, nontender, nondistended, normoactive bowel sounds. No rebound, guarding, rigidity. No auscultated abdominal bruits or pulsatile mass. BACK:  No CVA tenderness, no step-off deformity. MUSCULOSKELETAL:  No acute palpable bony deformity. Negative for calf tenderness. VASCULAR:  2+ radial, 1+ dorsalis pedis pulses. EXTREMITIES:  No cyanosis or clubbing. Nonpitting edema bilateral lower extremities with chronic venous stasis discoloration, erythema of both legs. SKIN:  Warm and dry with old superficial abrasions and fissures on the hands with callous formation and hyperemia of both hands without any acute palpable bony deformity. LABORATORY DATA:  Reviewed as follows:  Sodium 144, potassium 4.9, chloride 111, CO2 of 23, BUN of 75, creatinine 2.25, glucose 70, anion gap 10, calcium 7.7, magnesium 2.6, GFR 21. Total bilirubin is 0.5, total protein 5.8, albumin is 2.5, ALT 58, AST 33, alkaline phosphatase 134. Troponin I of less than 0.05. ProBNP of 18,553. WBC of 4.4, hemoglobin 14.3, hematocrit 47.6, platelets 85, neutrophils 83%. CT of the head without contrast, no acute findings. Chest x-ray, PA and lateral, is also reviewed and noted in HPI. 2D echocardiogram 05/18/2018 showed left ventricular ejection fraction 55% -60%. IMPRESSION AND PLAN: 
1. Acute on chronic congestive heart failure -- likely diastolic dysfunction. Repeat 2D echocardiogram in a.m. Consult with cardiologist.  Jayda Neff with diuresis; however, will have to monitor renal function closely due to her acute on chronic kidney injury. Place on strict I's and O's and daily weights. 2.  Acute on chronic hypoxic respiratory failure. Order a stat ABG, place on oxygen therapy. Continue oxygen therapy and pulse telemetry monitoring. 3.  Generalized weakness/debility. Place on fall precautions. I will also order x-rays of both hands. The patient is now complaining of pain after reported fall at home. 4.  Consult with physical and occupational therapy. Considered for case management consultation for discharge planning as patient lives alone and not able to care for herself. 5.  Acute pulmonary edema with bilateral pleural effusions. Plan as noted above. 6.  Type 2 diabetes mellitus. Place on Humalog insulin correction coverage, schedule Accu-Chek and check hemoglobin A1c level. 7.  Bilateral lower extremity edema. Keep legs elevated at rest.  Continue strict I's and O's. 
8.  Thrombocytopenia. Repeat platelet count. Cautious with antiplatelets anticoagulant therapy. 9.  Inability to walk -- due to generalized weakness. Plan as noted above. Consult with physical therapy. 10.  Dizziness. Place on fall precautions. 11.  Status post fall. Order CT of the head without contrast to rule out any acute process. 12.  Bradycardia. Place hold parameters for beta blockers. 13.  Hypertension. Monitor blood pressure closely as blood pressure is currently in the low range. 14.  Acute superimposed on chronic kidney disease. Repeat renal panel. I await the results of the UA. 15.  Deep venous thrombosis prophylaxis. Sequential compression devices lower extremities. DEZ Shaikh MD 
 
  
[id number] 
D: 01/18/2019 00:39 T: 01/18/2019 01:36 JOB #: [id number]

## 2019-01-18 NOTE — PROGRESS NOTES
TRANSFER - IN REPORT: 
Verbal report received from Cheryle(name) on Delphine Burkitt  being received from [facility](unit) for change in patient condition(hypothermic) Report consisted of patients Situation, Background, Assessment and  
Recommendations(SBAR). Information from the following report(s) SBAR, Kardex, Intake/Output, MAR, Accordion, Recent Results, Med Rec Status and Cardiac Rhythm sinus lior was reviewed with the receiving nurse. Opportunity for questions and clarification was provided. Assessment completed upon patients arrival to unit and care assumed. Primary Nurse Madie Ferguson and LAWRENCE Raygoza performed a dual skin assessment on this patient Impairment noted- see wound doc flow sheet - excoriation to groin area Bandar score is 17 
 
0312: Pt admitted to ICU, care assumed. Pt eyes open spontaneously, oriented x4, purposeful and spontaneous movement x4, pupils brisk, no numbness/tingling present. Pt in sinus lior, extremities cool to the touch, pulses palpable, BLE 2+ edema. 2L NC sats 93%, lung sounds coarse, pt received bumex in ED per RN. Excoriation in groin area, hands and feet cracked/dry, lotion applied, wound care consult entered, will continue monitoring 0315: Orders for temp sensing lopez received by Dr Crystal New, immediate 500mL yellow output 2404: Pt emergency contact and sister, Collette Bold, phoned to be updated on patient status 0400: Reassessment completed, no changes noted. Bedside and Verbal shift change report given to Estephanie Gorman (oncoming nurse) by Gamaliel Jesus (offgoing nurse). Report included the following information SBAR, Kardex, Intake/Output, MAR, Accordion, Recent Results, Med Rec Status, Cardiac Rhythm nsr and Alarm Parameters .

## 2019-01-18 NOTE — TELEPHONE ENCOUNTER
Verified patient with two types of identifiers. Patient's sister called to state she went to drop off some groceries with patient and she is \"not responding appropriately. \" Sister states she is breathing but \"she's not right. \" Encouraged sister to hang up with me and call 911 immediately. Sister was unsure of what to tell 911 and who would pick her up and where they would take her. Explained to tell 911 exactly what she told me, that patient is breathing and conscious but is altered. Also explained and ambulance would  the patient and take her to the nearest ER. Sister verbalized understanding. MD notified.

## 2019-01-18 NOTE — NURSE NAVIGATOR
Chart reviewed by Heart Failure Nurse Navigator. Heart Failure database completed. EF:  55/60% (5/2018) ACEi/ARB/ARNi: lisinopril 5 mg daily BB: coreg 6.25 mg twice daily Aldosterone Antagonist: not currently indicated CRT not currently indicated NYHA Functional Class documentation requested. Jim Tao Heart Failure Teach Back in Patient Education. Heart Failure Avoiding Triggers on Discharge Instructions. Cardiologist: not yet consulted Post discharge follow up phone call to be made within 48-72 hours of discharge. Potential HF Bundle participant

## 2019-01-18 NOTE — PROGRESS NOTES
Hospitalist Progress Note Audrey Jefferson MD 
Answering service: [phone] OR [phone] from in house phone Date of Service:  2019 NAME:  Drake Sheth :  1935 MRN:  [id number] Admission Summary: An 81-year-old white female with past medical history of chronic kidney disease, arthritis, COPD, cor pulmonale, CHF, hypertension, type 2 diabetes mellitus, hypothyroidism, gait abnormality presented to the emergency department from home via EMS with chief complaint of shortness of breath. No generalized weakness. The patient is a limited historian, majority of history was obtained from review of ED and electronic medical record. Per the collective reports, the patient's sister tried to call the patient, became worried when she had not received an answer. She reportedly went to check on the patient and found that the patient was unable to answer the phone. Patient reports that she fell. Interval history / Subjective:  
Pt do not talk much MM dry cheat CTA she uses o2 at home 2 L NC Seen by cardiology this morning Assessment & Plan: 1. Chronic congestive heart failure NYHA 3-4 :  
- does not look volume overloaded her MM dry - per sister not eating / drinking well last few days 
- her BP low hold BP/ MED and Diuretics hold all meds 
- gentle hydration for now 
- higher BNP is sometime non specific in renal failure 2. Chronic hypoxic respiratory failure :  
- unclear etiology uses baseline home o2  
-  Continue oxygen therapy and pulse telemetry monitoring. 
- CXR - mild interstitial edema and small pleural effusions. 3.  Generalized weakness/debility.   
- Place on fall precautions. Reported fall at home get CK 
- Consult with physical and occupational therapy. -  patient lives alone and not able to care for herself. 4.  Type 2 diabetes mellitus. - Place on Humalog insulin correction coverage, schedule Accu-Chek and check hemoglobin A1c level. On lantus hold for tonight 7. Bilateral lower extremity edema. Keep legs elevated at rest.  Continue strict I's and O's. 
 
8.  Thrombocytopenia. Repeat platelet count. Hold any A/C 
 
9. Dizziness. Place on fall precautions. May be due to hypotension 
-  CT of the head without contrast to rule out any acute process. 10.  Bradycardia. Place hold parameters for beta blockers. Was on coreg 11. Acute superimposed on chronic kidney disease.  
-  Repeat renal panel. Nephrology consult , gentle hydration 12. Hypothermia from? 
- check cortisol and TSH 
- cont levothyroxine - provide karen maher Code status: Full DVT prophylaxis: SCD Care Plan discussed with: Patient/Family and Nurse Disposition: TBD Hospital Problems  Date Reviewed: 1/17/2019 Codes Class Noted POA  
 SOB (shortness of breath) ICD-10-CM: R06.02 
ICD-9-CM: 786.05  1/17/2019 Unknown Hypoxia ICD-10-CM: R09.02 
ICD-9-CM: 799.02  1/17/2019 Unknown Inability to walk ICD-10-CM: R26.2 ICD-9-CM: 719.7  1/17/2019 Unknown * (Principal) Acute exacerbation of CHF (congestive heart failure) (HCC) ICD-10-CM: I50.9 ICD-9-CM: 428.0  1/17/2019 Unknown Acute-on-chronic kidney injury ([facility] ICD-10-CM: N17.9, N18.9 ICD-9-CM: 584.9, 585.9  1/17/2019 Unknown Review of Systems:  
Review of systems not obtained due to patient factors. Vital Signs:  
 Last 24hrs VS reviewed since prior progress note. Most recent are: 
Visit Vitals BP (!) 85/45 Pulse 65 Temp (!) 94.6 °F (34.8 °C) Resp 14 Ht 5' 1\" (1.549 m) Wt 74 kg (163 lb 3.2 oz) SpO2 91% BMI 30.84 kg/m² Intake/Output Summary (Last 24 hours) at 1/18/2019 1228 Last data filed at 1/18/2019 1100 Gross per 24 hour Intake  Output 1075 ml Net -1075 ml Physical Examination: Constitutional:  No acute distress, cooperative, pleasant   
ENT:  Oral mucous moist, oropharynx benign. Neck supple, Resp:  CTA bilaterally. No wheezing/rhonchi/rales. No accessory muscle use CV:  Regular rhythm, normal rate, no murmurs, gallops, rubs GI:  Soft, non distended, non tender. normoactive bowel sounds, no hepatosplenomegaly Musculoskeletal:  No edema, warm, 2+ pulses throughout Neurologic:  Moves all extremities. AAOx3, CN II-XII reviewed Psych:  Good insight, Not anxious nor agitated. Data Review:  
 I personally reviewed  Image and tbd Labs:  
 
Recent Labs  
  01/18/19 0218 01/17/19 
1722 WBC 4.9 4.4 HGB 14.0 14.3 HCT 46.1 47.6* PLT 81* 85* Recent Labs  
  01/18/19 0218 01/17/19 
1722 * 144  
K 4.7 4.9 * 111* CO2 24 23 BUN 78* 75* CREA 2.21* 2.25* GLU 69 70 CA 7.5* 7.7* MG  --  2.6* Recent Labs  
  01/17/19 
1722 SGOT 33 ALT 58 * TBILI 0.5 TP 5.8* ALB 2.5*  
GLOB 3.3 No results for input(s): INR, PTP, APTT in the last 72 hours. No lab exists for component: INREXT No results for input(s): FE, TIBC, PSAT, FERR in the last 72 hours. No results found for: FOL, RBCF No results for input(s): PH, PCO2, PO2 in the last 72 hours. Recent Labs  
  01/17/19 
1722 TROIQ <0.05 No results found for: CHOL, CHOLX, CHLST, CHOLV, HDL, LDL, LDLC, DLDLP, TGLX, TRIGL, TRIGP, CHHD, CHHDX Lab Results Component Value Date/Time Glucose (POC) 81 01/18/2019 06:38 AM  
 Glucose (POC) 98 01/18/2019 02:31 AM  
 Glucose (POC) 75 01/18/2019 02:05 AM  
 Glucose (POC) 286 (H) 05/22/2018 11:50 AM  
 Glucose (POC) 176 (H) 05/22/2018 06:31 AM  
 
Lab Results Component Value Date/Time  Color YELLOW/STRAW 05/17/2018 03:43 PM  
 Appearance CLEAR 05/17/2018 03:43 PM  
 Specific gravity 1.013 05/17/2018 03:43 PM  
 pH (UA) 6.5 05/17/2018 03:43 PM  
 Protein 100 (A) 05/17/2018 03:43 PM  
 Glucose >1,000 (A) 05/17/2018 03:43 PM  
 Ketone NEGATIVE  05/17/2018 03:43 PM  
 Bilirubin NEGATIVE  05/17/2018 03:43 PM  
 Urobilinogen 0.2 05/17/2018 03:43 PM  
 Nitrites NEGATIVE  05/17/2018 03:43 PM  
 Leukocyte Esterase NEGATIVE  05/17/2018 03:43 PM  
 Epithelial cells FEW 05/17/2018 03:43 PM  
 Bacteria NEGATIVE  05/17/2018 03:43 PM  
 WBC 0-4 05/17/2018 03:43 PM  
 RBC 5-10 05/17/2018 03:43 PM  
 
 
 
Medications Reviewed:  
 
Current Facility-Administered Medications Medication Dose Route Frequency  aspirin delayed-release tablet 81 mg  81 mg Oral DAILY  levothyroxine (SYNTHROID) tablet 100 mcg  100 mcg Oral Once per day on Mon Tue Wed Thu Fri Sat  [START ON 1/20/2019] levothyroxine (SYNTHROID) tablet 200 mcg  200 mcg Oral every Sunday  glucose chewable tablet 16 g  4 Tab Oral PRN  
 dextrose (D50W) injection syrg 12.5-25 g  25-50 mL IntraVENous PRN  
 glucagon (GLUCAGEN) injection 1 mg  1 mg IntraMUSCular PRN  
 insulin lispro (HUMALOG) injection   SubCUTAneous AC&HS  sodium chloride (NS) flush 5-40 mL  5-40 mL IntraVENous Q8H  
 sodium chloride (NS) flush 5-40 mL  5-40 mL IntraVENous PRN  
 dextrose 5 % - 0.45% NaCl infusion  50 mL/hr IntraVENous CONTINUOUS  
 
______________________________________________________________________ EXPECTED LENGTH OF STAY: 4d 2h 
ACTUAL LENGTH OF STAY:          1 Howie Nettles MD

## 2019-01-18 NOTE — PROGRESS NOTES
Reason for Admission:   Worsening shortness of breath, leg swelling, and \"waves of dizziness\". S/P GLF this am, sister reports increased difficulty ambulating with rollator, unable to answer door today. 5/17/18-5/22/18 [address] [place] [address] Admission Discharged to [facility] 6/28/18  Discharged from [facility] to Home with NADIA DAMON [facility] **At discharge 6/28/18 recommended snf, sister Karlene SARAH/ 
            
RRAT Score:     35/0/1   High Risk/Level III Resources/supports as identified by patient/family:   Sister is only support for patient, sister appears to be in much better health. Jesusita Nguyen [phone]   [phone] does grocery shopping. Medications delivered by [address] [place] [address] - meds in bottles not in blister packs. Top Challenges facing patient (as identified by patient/family and CM): Finances/Medication cost?      [facility]/Sister unsure about cost of meds Transportation? Sister, Jesusita Nguyen will transport with portable oxygen tanks/[facility] Support system or lack thereof? Limited support system Living arrangements? Lives in one story condo, steps in front and back, lives ALONE Self-care/ADLs/Cognition? Has been independent with bathing and cooking, sister buys groceries. Alert and oriented. Current Advanced Directive/Advance Care Plan:  **No advanced care plan, asked sister and she verified. ** GOAL - Advanced Care Plan to be initiated. Plan for utilizing home health:    Has used [facility] in the past. 
                   
Likelihood of readmission:  High 
              
Transition of Care Plan:               
Piter Cuba is a 81year old female to [address] [place] [address] ED after sister unable to get her to the phone, EMS to [address] [place] [address] ED with complaints of shortness of breath, leg swelling, and dizziness.   ED workup completed, kidney functions tests elevated, pro BNP elevated, cxr - Cardiomegaly, mild interstitial edema and small pleural effusions. Consulted hospitalist for admission. Verified face sheet and demographics. Ye [place]  [phone] Cell [phone] Care Management Interventions PCP Verified by CM: Yes(Dr. Johnson/[address]) Last Visit to PCP: 08/01/18 Palliative Care Criteria Met (RRAT>21 & CHF Dx)?: Yes(RRAT 23, CHF - will recommend Palliative Care meet with patient and sister) Palliative Consult Recommended?: Yes Transition of Care Consult (CM Consult): Discharge Planning(RRAT 23/0/1  Multiple Co-Morbidities, CHF, COPD, continous oxygen in the home) MyChart Signup: No 
Discharge Durable Medical Equipment: No 
Health Maintenance Reviewed: Yes Physical Therapy Consult: No 
Occupational Therapy Consult: No 
Speech Therapy Consult: No 
Current Support Network: Lives Alone(Lives alone in [facility], one level, steps in front and back) Plan discussed with Pt/Family/Caregiver: Yes(Met with patient and sister in ED/hallway, bed became available and  placed in ED/13) Anticipate Ms. Vee Grimm needing therapy, discussed with sister options including SARAH and having Palliative evaluate. She is open to suggestions, Ms. Vee Grimm is poor historian. She is unable to give any financial information related to her income. Care Management will continue to follow up for discharge planning. Juan Wilson, RN, BSN, [facility] 
ED Care Management [phone]

## 2019-01-18 NOTE — ED NOTES
Incontinence care provided on patient. Rash cream applied to her bottom as week as perineal area. Appears to be an incontinence rash. Ranjana Boyce also started on patient.

## 2019-01-18 NOTE — CONSULTS
NEPHROLOGY CONSULT NOTE Patient: Romy Whitley MRN: [id number]  PCP: Claudia Ravi MD  
:     1935  Age:   81 y.o. Sex:  female Referring physician: Andrzej Jones MD 
Reason for consultation: 81 y.o. female with Acute exacerbation of CHF (congestive heart failure) ([facility] Hypoxia Inability to walk Acute-on-chronic kidney injury ([facility] SOB (shortness of breath) complicated by MORGAN Admission Date: 2019  4:20 PM  LOS: 1 day ASSESSMENT and PLAN :  
MORGAN on CKD: 
- this appears to be volume depletion from poor po intake + diuresis - Renal U/S to eval for obstruction 
- agree with IVF 
- hold diuretics - will change to NS after her bolus is done 
- keep MAP > 65 
- daily labs and strict I/Os CKD IV: 
- baseline Cr 1.8 to 2 
- from DM and HTN Hypovolemia: 
- cont with IVF as above Hypotension: 
- hopefully all from hypovolemia 
- r/o infection - pressors and IVF to keep MAP > 65 Hypernatremia: 
- from dehydration and lack of free water intake 
- hydrate with NS 
 
HFpEF: 
- agree that pt does not appear to be in CHF 
- hold diuretics and continue with volume repletion Hx of HTN: 
- hold BP meds Hypothyroidism: 
- on synthroid DM2: 
- on insulin Active Problems / Assessment AAActive  :  
Principal Problem: 
  Acute exacerbation of CHF (congestive heart failure) ([facility] (2019) Active Problems: 
  SOB (shortness of breath) (2019) Hypoxia (2019) Inability to walk (2019) Acute-on-chronic kidney injury ([facility] (2019) Subjective: HPI: Romy Whitley is a 81 y.o.  female who has been admitted to the hospital for a variety of complaints including fatigue, extreme weakness and dyspnea. She has a hx of CHF w/ preserved EF in May of 2018, CKD IV w/ a baseline Cr of 1.8 to 2, not followed by nephrology, hx of HTN, DM2.   She was initially diuresed, but became hypotensive requiring pressors. Her UOP has declined as well. Her Cr was 2.25 on admission and is stable at 2.2 today. She has been weak at home w/ poor po intake. No n/v/d reported. Currently, she is awake and alert and on NC O2. No cp reported. Past Medical Hx:  
Past Medical History:  
Diagnosis Date  MORGAN (acute kidney injury) ([facility] 3/25/2018  Arthritis  Chronic [facility] disease [facility] 7/23/2017  Cor pulmonale ([facility] 8/29/2017  Diabetes ([facility]  Elevated brain natriuretic peptide (BNP) level 7/23/2017  Essential hypertension 7/23/2017  Shortness of breath  Thyroid disease Past Surgical Hx: 
  
Past Surgical History:  
Procedure Laterality Date  HX CATARACT REMOVAL Bilateral   
 
Medications: 
Prior to Admission medications Medication Sig Start Date End Date Taking? Authorizing Provider  
glipiZIDE (GLUCOTROL) 10 mg tablet Take 10 mg by mouth two (2) times a day. 1/3/19  Yes Provider, Historical  
bumetanide (BUMEX) 2 mg tablet Take 1 Tab by mouth daily. 1/10/19  Yes Yumiko Dang MD  
lisinopril (PRINIVIL, ZESTRIL) 5 mg tablet Take 5 mg by mouth two (2) times a day. 1/10/19  Yes Yumiko Dang MD  
insulin glargine (LANTUS U-100 INSULIN) 100 unit/mL injection 2 Units by SubCUTAneous route daily. Yes Provider, Historical  
levothyroxine (SYNTHROID) 100 mcg tablet Take 100 mcg by mouth six (6) days a week. Monday through Saturday   Yes Provider, Historical  
levothyroxine (SYNTHROID) 100 mcg tablet Take 200 mcg by mouth every Sunday. Yes Provider, Historical  
vit A/vit C/vit E/zinc/copper (PRESERVISION AREDS PO) Take 1 Tab by mouth daily. Yes Provider, Historical  
carvedilol (COREG) 6.25 mg tablet Take 6.25 mg by mouth two (2) times daily (with meals). Yes Provider, Historical  
aspirin delayed-release 81 mg tablet Take 81 mg by mouth daily. Yes Provider, Historical  
 
 
Allergies Allergen Reactions  Ace Inhibitors Other (comments) Elevated creatinine  Other Medication Rash Auromycin Chlorocetin Social Hx:  reports that she quit smoking about 19 years ago. she has never used smokeless tobacco. She reports that she does not drink alcohol or use drugs. Family History Problem Relation Age of Onset  Diabetes Brother Review of Systems: A twelve point review of system was performed today. Pertinent positives and negatives are mentioned in the HPI. The reminder of the ROS is negative and noncontributory. Objective:  
Vitals:   
Vitals:  
 01/18/19 1200 01/18/19 1300 01/18/19 1400 01/18/19 1500 BP: (!) 89/60 109/53 (!) 77/40 (!) 81/40 Pulse: 69 74 66 66 Resp: 15 15 13 13 Temp: 95.6 °F (35.3 °C) SpO2: 93% (!) 89% 90% 90% Weight:      
Height:      
 
I&O's:  01/17 0701 - 01/18 0700 In: -  
Out: [address] Visit Vitals BP (!) 81/40 Pulse 66 Temp 95.6 °F (35.3 °C) Resp 13 Ht 5' 1\" (1.549 m) Wt 74 kg (163 lb 3.2 oz) SpO2 90% BMI 30.84 kg/m² Physical Exam: 
General:Alert, No distress, frail HEENT: EOMI, dry MM Neck:Supple,no mass palpable Lungs : decreased bibasilar breath sounds CVS: RRR, S1 S2 normal, No rub,  no LE edema Abdomen: Soft, Non tender, No hepatosplenomegaly, bowel sounds present Extremities: No cyanosis, No clubbing Skin: poor skin turgor, tenting noted on exam 
Lymph nodes: No palpable nodes MS: No joint swelling, erythema, warmth Neurologic: non focal, AAO x 3 Psych: normal affect : lopez in place Laboratory Results: 
 
Lab Results Component Value Date BUN 78 (H) 01/18/2019  (H) 01/18/2019  
 K 4.7 01/18/2019  (H) 01/18/2019 CO2 24 01/18/2019 Lab Results Component Value Date BUN 78 (H) 01/18/2019 BUN 75 (H) 01/17/2019 BUN 43 (H) 01/10/2019 BUN 51 (H) 05/22/2018 BUN 44 (H) 05/21/2018 K 4.7 01/18/2019  
 K 4.9 01/17/2019  
 K 5.2 01/10/2019  
 K 4.4 05/22/2018 K 4.2 05/21/2018 Lab Results Component Value Date WBC 4.9 01/18/2019 RBC 4.80 01/18/2019 HGB 14.0 01/18/2019 HCT 46.1 01/18/2019 MCV 96.0 01/18/2019 MCH 29.2 01/18/2019 RDW 18.3 (H) 01/18/2019 PLT 81 (L) 01/18/2019 Lab Results Component Value Date PHOS 3.6 05/18/2018 Urine dipstick:  
Lab Results Component Value Date/Time Color YELLOW/STRAW 01/18/2019 12:44 PM  
 Appearance CLOUDY (A) 01/18/2019 12:44 PM  
 Specific gravity 1.015 01/18/2019 12:44 PM  
 pH (UA) 5.0 01/18/2019 12:44 PM  
 Protein 100 (A) 01/18/2019 12:44 PM  
 Glucose NEGATIVE  01/18/2019 12:44 PM  
 Ketone NEGATIVE  01/18/2019 12:44 PM  
 Bilirubin NEGATIVE  01/18/2019 12:44 PM  
 Urobilinogen 0.2 01/18/2019 12:44 PM  
 Nitrites NEGATIVE  01/18/2019 12:44 PM  
 Leukocyte Esterase TRACE (A) 01/18/2019 12:44 PM  
 Epithelial cells FEW 01/18/2019 12:44 PM  
 Bacteria 1+ (A) 01/18/2019 12:44 PM  
 WBC 0-4 01/18/2019 12:44 PM  
 RBC 10-20 01/18/2019 12:44 PM  
 
 
I have reviewed the following: All pertinent labs, microbiology data, radiology imaging for my assessment Thank you for allowing us to participate in the care of this patient. We will follow patient. Please dont hesitate to call with any questions Roseline Brown MD 
1/18/2019 [address] [place] [address]

## 2019-01-18 NOTE — ED NOTES
Bedside and Verbal shift change report [facility] [address] [facility] [facility] by Lali Adams RN (offgoing nurse). Report included the following information SBAR, ED Summary, Intake/Output, MAR and Recent Results. Lali Adams RN to speak with pt and pt's sister about discharge status, stating Dr. Jenifer Gutierrez will be with them shortly to discharge. Pt's sister appears frustrated at bedside, stating she cannot take her home tonight and that the pt needs to be placed in a nursing home tonight. Lali Adams RN spoke with Mohamud with care management who will be in ED shortly to speak with pt and family about options. Lali Adams RN updated pt and family on plan of care.

## 2019-01-18 NOTE — WOUND CARE
WOCN Note:  
 
New consult placed for assessment of hands and groin. Chart reviewed. Admitted DX:  Acute exacerbation of CHF (congestive heart failure) ([facility] Hypoxia Inability to walk Acute-on-chronic kidney injury ([facility] SOB (shortness of breath) Past Medical History:  chronic kidney disease, arthritis, COPD, cor pulmonale, CHF, hypertension, type 2 diabetes mellitus, hypothyroidism, gait abnormality Assessment:  
Patient is alert, communicative and requires assist with repositioning. Bed: sport Patient wearing briefs for incontinence Patient reports no pain. Patient repositioned on left side with pillow. Heels offloaded on prevalon boots. POA Left heel, Pressure Injury DTI:  0.8 X 0.8 x 0 cm non blanching purple. POA Right heel, Pressure Injury DTI 1.5 x 1.5 x 0 cm diffuse non blanching purple. POA Left buttock, Pressure Injury DTI:  2 x 2 x 0 cm; non blanching purple POA Right buttock, Pressure Injury DTI:  3 x 0.5 0 cm; non blanching purple Bilateral groin, red moist rash consistent with Candidiais Bilateral hands, multiple areas of linear dry abrasions from unknown source. Recommendations:   
Bilateral hands:  Twice daily apply Vitamin A&D ointment. Bilateral heel, sacrum and buttocks:  Twice daily apply Venelex. Bilateral groin:  Twice daily apply Secura antifungal cream. 
 
Skin Care & Pressure Prevention: 
Minimize layers of linen/pads under patient to optimize support surface. Turn/reposition approximately every 2 hours and offload heels. Manage incontinence / promote continence Specialty bed: sport. Use only flat sheet and one incontinence pad. Please call Dipika Winter when patient is transferred out of ICU and obtain a Envision on [employer]. Discussed above plan with patient and RN. Transition of Care: Plan to follow as needed while admitted to hospital. 
 
HARRIET Metz RN [facility], [facility]. Wound Care Office [phone] Pager [phone]

## 2019-01-18 NOTE — PROGRESS NOTES
In addition to my earlier note and in my dictation She has cor pulmonale and her type of cardiac dysfx from the RV is not fixable Goal try balance her volume with supportive care Long term will need nursing home or assisted living and consider palliative care

## 2019-01-18 NOTE — ROUTINE PROCESS
TRANSFER - OUT REPORT: 
 
Verbal report given to Janette(name) on Matilde Lockett  being transferred to [facility](unit) for routine progression of care Report consisted of patients Situation, Background, Assessment and  
Recommendations(SBAR). Information from the following report(s) SBAR and ED Summary was reviewed with the receiving nurse. Lines:  
Peripheral IV 01/17/19 Right Forearm (Active) Site Assessment Clean, dry, & intact 1/17/2019  9:21 PM  
Phlebitis Assessment 0 1/17/2019  9:21 PM  
Infiltration Assessment 4 1/17/2019  9:21 PM  
Dressing Status Clean, dry, & intact 1/17/2019  9:21 PM  
Hub Color/Line Status Pink 1/17/2019  9:21 PM  
  
 
Opportunity for questions and clarification was provided. Patient transported with: 
 Registered Nurse

## 2019-01-18 NOTE — PROGRESS NOTES
Spiritual Care Assessment/Progress Note [address] 
 
 
NAME: Drake Sheth      MRN: [id number] AGE: 81 y.o. SEX: female Latter-day Affiliation: Lorenzo Smith  
Language: [place] 1/18/2019     Total Time (in minutes): 21 Spiritual Assessment begun in [facility] 7S2 INTENSIVE CARE through conversation with: 
  
    [x]Patient        [x] Family    [] Friend(s) Reason for Consult: Palliative Care, Initial/Spiritual Assessment Spiritual beliefs: (Please include comment if needed) [x] Identifies with a karel tradition: Spiritism    
   [] Supported by a karel community:        
   [] Claims no spiritual orientation:       
   [] Seeking spiritual identity:            
   [] Adheres to an individual form of spirituality:       
   [] Not able to assess:                   
 
    
Identified resources for coping:  
   [x] Prayer                           
   [] Music                  [] Guided Imagery 
   [] Family/friends                 [] Pet visits [] Devotional reading                         [] Unknown 
   [] Other Interventions offered during this visit: (See comments for more details) Patient Interventions: Initial/Spiritual assessment, patient floor, Prayer (assurance of) Family/Friend(s): Affirmation of emotions/emotional suffering, Catharsis/review of pertinent events in supportive environment, Initial Assessment, Prayer (assurance of), Latter-day beliefs/image of God discussed Plan of Care: 
 
 [x] Support spiritual and/or cultural needs  
 [] Support AMD and/or advance care planning process    
 [] Support grieving process 
 [] Coordinate Rites and/or Rituals  
 [] Coordination with community clergy [] No spiritual needs identified at this time 
 [] Detailed Plan of Care below (See Comments)  [] Make referral to Music Therapy 
[] Make referral to Pet Therapy    
[] Make referral to Addiction services [] Make referral to [facility] 
[] Make referral to Spiritual Care Partner 
[] No future visits requested       
[] Follow up visits as needed Comments: Visited with Ms. Farheen Lopez and her sister Thang Archer for initial spiritual assessment. Consulted with pt's nurse prior to my visit. Interacted briefly with Ms. Farheen Lopez; most of conversation was with pt's sister, Thang Archer. Thang Archer shared that pt is unable to attend [employer], so she watches [employer] on television. Thang Archer believes that pt will appreciate  support during her hospitalization. Offered spoken words of assurance and prayer with patient. Offered listening presence as Thang Archer shared reasons for patients hospitalization and the decline in function that pt has experienced. Pt used to enjoy going out to lunch with friends, but hasn't been able to do that for 6 months now. Other past interests include bowling. 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 Thang Archer expressed concerns about pt living alone, and she feels that pt is now realizing that this is something she will not be able to do in the future. Pt's brother is coming to visit from [place] this weekend. Chaplains will continue to follow during this hospitalization for spiritual support. Palliative has been consulted, but it may be Monday before palliative team meets with pt/family. Ilene Glass, Palliative

## 2019-01-18 NOTE — CONSULTS
Palliative medicine Consult received, care discussed w/ palliative  Ilene who saw pt. Will see together on Monday.

## 2019-01-18 NOTE — PROGRESS NOTES
0730: Bedside shift change report given to Ashwin Santos RN (oncoming nurse) by Lynda Forrester RN (offgoing nurse). Report included the following information SBAR, Kardex, ED Summary, Procedure Summary, Intake/Output, MAR, Accordion and Recent Results. 0800: patient passed STAND, tray given. 1300: HYPOGLYCEMIC EPISODE DOCUMENTATION Patient with hypoglycemic episode(s) at 1247(time) on 1/18/19(date). BG value(s) pre-treatment 51 Was patient symptomatic? [] yes, [x] no Patient was treated with the following rescue medications/treatments: [x] D50 [] Glucose tablets 
              [] Glucagon 
              [] 4oz juice 
              [] 6oz reg soda 
              [] 8oz low fat milk BG value post-treatment: 170 Once BG treated and value greater than 80mg/dl, pt was provided with the following: 
[] snack 
[] meal 
Name of MD notified:Dr. Oren Woodall The following orders were received: Maintainance fluid ordered 1313: Dr. Oren Woodall at bedside, spoke with patients POA, her sister Arben Lindsay, DNR signed at bedside. 1400: Patient Hypotensive 78/41. Dr. Ro Perdomo paged and updated. Orders received. Nephro consult called to Dr. Alexander Estevez. 1515: Dopamine gtt started. Dr. Alexander Estevez at bedside. 1555: Consulted pharm about started Dopamine gtt in PIV, per policy drug needs a central line when infusing greater than 5mcg/kg. Drip was started at 6mcg/kg with orders to titrate up for MAP >65. Dr. Ro Perdomo paged and updated. He will be up shortly 1600: Dr. Ro Perdomo at bedside, MAP now 73, ok to titrate down with Dopamine gtt and if needed ok to start Amanuel gtt. 1800: Resp paged, patients O2 86-90 on 6L NC, patients seems to be a mouth breather, Appreciated resp input on switching  resp devices. Patient placed on vent mask at 12L 50%.

## 2019-01-18 NOTE — PROGRESS NOTES
Consult received and appreciated, however order entered per protocol, which is a nursing order. Discussed case with nsg who reports nsg dysphagia screen completed and WNL and patient ate breakfast without difficulty. SLP requires a physician's order to complete swallowing evaluation. If formal evaluation is desired, please re-consult with MD order. Thanks. Radha Hankins M.CD.  CCC-SLP

## 2019-01-18 NOTE — PROCEDURES
[address] 
*** FINAL REPORT *** Name: Jonathan Moody 
MRN: [id number] : 20 Dec 1935 [facility] Order #: [id number] [facility] Visit #: [id number] Date: 2019 TYPE OF TEST: Peripheral Venous Testing REASON FOR TEST Edema Right Leg:- 
Deep venous thrombosis:           No 
Superficial venous thrombosis:    No 
Deep venous insufficiency:        Not examined Superficial venous insufficiency: Not examined Left Leg:- 
Deep venous thrombosis:           No 
Superficial venous thrombosis:    No 
Deep venous insufficiency:        Not examined Superficial venous insufficiency: Not examined INTERPRETATION/FINDINGS 
PROCEDURE:  Color duplex ultrasound imaging of lower extremity veins. FINDINGS: 
     Right: The common femoral, deep femoral, femoral, popliteal, 
posterior tibial, peroneal, and great saphenous are patent and without 
 evidence of thrombus;  each is fully compressible and there is no 
narrowing of the flow channel on color Doppler imaging. Phasic flow 
is observed in the common femoral vein. Left:   The common femoral, deep femoral, femoral, popliteal, 
posterior tibial, peroneal, and great saphenous are patent and without 
 evidence of thrombus;  each is fully compressible and there is no 
narrowing of the flow channel on color Doppler imaging. Phasic flow 
is observed in the common femoral vein. IMPRESSION:  No evidence of right or left lower extremity vein 
thrombosis. ADDITIONAL COMMENTS I have personally reviewed the data relevant to the interpretation of 
this 
study. TECHNOLOGIST: Zahra Ferguson.  Deb Causey 
Signed: 2019 10:28 AM 
 
PHYSICIAN: Zuly Guy MD 
Signed: 2019 02:49 PM

## 2019-01-18 NOTE — PROGRESS NOTES
Advance Care Planning Note Name: Matilde Lockett YOB: 1935 MRN: [id number] Admission Date: 1/17/2019  4:20 PM 
 
Date of discussion: 1/18/2019 Active Diagnoses: 
 
Hospital Problems  Date Reviewed: 1/17/2019 Codes Class Noted POA  
 SOB (shortness of breath) ICD-10-CM: R06.02 
ICD-9-CM: 786.05  1/17/2019 Unknown Hypoxia ICD-10-CM: R09.02 
ICD-9-CM: 799.02  1/17/2019 Unknown Inability to walk ICD-10-CM: R26.2 ICD-9-CM: 719.7  1/17/2019 Unknown * (Principal) Acute exacerbation of CHF (congestive heart failure) (HCC) ICD-10-CM: I50.9 ICD-9-CM: 428.0  1/17/2019 Unknown Acute-on-chronic kidney injury ([facility] [address] ICD-10-CM: N17.9, N18.9 ICD-9-CM: 584.9, 585.9  1/17/2019 Unknown These active diagnoses are of sufficient risk that focused discussion on advance care planning is indicated in order to allow the patient to thoughtfully consider personal goals of care, and if situations arise that prevent the ability to personally give input, to ensure appropriate representation of their personal desires for different levels and aggressiveness of care. Discussion:  
 
Persons present and participating in discussion: Matilde LockettAdonay MD, Discussion: I explained pt her current medical condition and chance of deterioration. Pt currently acutely ill and unable to clearly understand the meaning of CPR and ventilator support. d/w pt sister  And she informed me that she expressed her wish not to be resuscitated will order DNR Time Spent:  
 
Total time spent face-to-face in education and discussion: 20  minutes. Adonay Wren MD 
1/18/2019 12:50 PM

## 2019-01-18 NOTE — PROGRESS NOTES
0540 Received patient from ED with rectal temp of 89.8, called Dr. Gabriela Jo ordered transfer to ICU with bear hugger. Placed 3 blankets on patient and cut temperature in room up to 80 degrees. Stat EKG done and called for stat blood gas. Will continue to monitor. 0210 Checked blood sugar result 75 gave apple juice and apple sauce up to 98. 
 
0245 gave report to [facility] on ICU, transferred to ICU 13.

## 2019-01-18 NOTE — PROGRESS NOTES
Full note to be dictated Fabiola Turk is a 81 y.o. female With  
Recent edema in office so more diuretic but now reduced po intake so admit with AMS and is dry This is not an acute exacerbation of her CHF but rather she seems tenting, hypotensive Exam notable for  
bp in 80s x 3 Swollen tongue Tenting to skin Minimal pedal edema , much less than baseline Data notable for High BUN compared to baseline Creat only up a bit, fits with hypovolemia, and Na is elevated at 146 Stop diuretic Give stat fluids Monitor bp and mental status Following closely

## 2019-01-18 NOTE — PROGRESS NOTES
The following herbal, alternative, and/or nutritional/dietary supplement product(s) has been discontinued  per [facility] approved policy:   
Preservision AREDS Please reorder upon discharge if appropriate.

## 2019-01-19 PROBLEM — R40.1 OBTUNDATION: Status: ACTIVE | Noted: 2019-01-01

## 2019-01-19 NOTE — PROGRESS NOTES
Hospitalist Progress Note Jani Jeffrey MD 
Answering service: [phone] OR [phone] from in house phone Date of Service:  2019 NAME:  Piter [place] :  1935 MRN:  [id number] Admission Summary: An 52-year-old white female with past medical history of chronic kidney disease, arthritis, COPD, cor pulmonale, CHF, hypertension, type 2 diabetes mellitus, hypothyroidism, gait abnormality presented to the emergency department from home via EMS with chief complaint of shortness of breath. No generalized weakness. The patient is a limited historian, majority of history was obtained from review of ED and electronic medical record. Per the collective reports, the patient's sister tried to call the patient, became worried when she had not received an answer. She reportedly went to check on the patient and found that the patient was unable to answer the phone. Patient reports that she fell. Interval history / Subjective:  
Patient is seen and examined at bedside. She is very lethargic and sob. Denied any pain. Discussed with sister  Judy Rivera who agreed to discuss hospice options Assessment & Plan: 1. Chronic congestive heart failure NYHA 3-4 :  
- does not look volume overloaded her MM dry - per sister not eating / drinking well last few days 
- her BP low. hold BP meds and Diuretics 
- gentle hydration for now 
- higher BNP is sometime non specific in renal failure  
-cardiology tried dopamine drip yesterday but today's notes from Dr. Couch Side states : \" On dopamine would d/c and pursue palliative care\" 2. Chronic hypoxic respiratory failure :  
- unclear etiology uses baseline home o2 . Possibly due to CHF 
-  Continue oxygen therapy and pulse telemetry monitoring. 
- CXR - mild interstitial edema and small pleural effusions. 3.  Generalized weakness/debility. - Place on fall precautions. Reported fall at home get CK 
- Consult with physical and occupational therapy. -  patient lives alone and not able to care for herself. 4.  Type 2 diabetes mellitus. - Place on Humalog insulin correction coverage, schedule Accu-Chek and check hemoglobin A1c level. On lantus hold 7. Bilateral lower extremity edema. Keep legs elevated at rest.  Continue strict I's and O's. 
 
8.  Thrombocytopenia.  will monitor. No signs of bleeding. Hold any A/C 
 
9. Dizziness. Place on fall precautions. May be due to hypotension 
-  CT of the head without contrast to rule out any acute process. 10.  Bradycardia. Place hold parameters for beta blockers. Was on coreg 11. Acute superimposed on chronic kidney disease. Hypernatremia 
- nephrology on board - Renal us: Small kidneys with normal echogenicity without hydronephrosis. - IVF per nephro 
- will monitor renal function 12. Hypothermia  resolved 
- could be due to sepsis 
- TSH low - levothyroxine adjusted - provide karen maher 13. Hypocalcemia- replaced Palliative care consulted 1/19: Discussed in length with sister Sagrario Florentino regarding poor prognosis and she is in agreement. She does not wish to do anything aggressive measures and agreed to talk to hospice. She would prefer if the patient can be inpatient hospice. Hospice consult placed. Downgraded to remote tele Code status: DNR 
DVT prophylaxis: SCD Care Plan discussed with: Patient/Family and Nurse Disposition: TBD Hospital Problems  Date Reviewed: 1/17/2019 Codes Class Noted POA  
 SOB (shortness of breath) ICD-10-CM: R06.02 
ICD-9-CM: 786.05  1/17/2019 Unknown Hypoxia ICD-10-CM: R09.02 
ICD-9-CM: 799.02  1/17/2019 Unknown Inability to walk ICD-10-CM: R26.2 ICD-9-CM: 719.7  1/17/2019 Unknown * (Principal) Acute exacerbation of CHF (congestive heart failure) (HCC) ICD-10-CM: I50.9 ICD-9-CM: 428.0  1/17/2019 Unknown Acute-on-chronic kidney injury ([facility] [address] ICD-10-CM: N17.9, N18.9 ICD-9-CM: 584.9, 585.9  1/17/2019 Unknown Review of Systems:  
Review of systems not obtained due to patient factors. Vital Signs:  
 Last 24hrs VS reviewed since prior progress note. Most recent are: 
Visit Vitals /44 Pulse 87 Temp 99.2 °F (37.3 °C) Resp 15 Ht 5' 1\" (1.549 m) Wt 73.3 kg (161 lb 9.6 oz) SpO2 (!) 89% BMI 30.53 kg/m² Intake/Output Summary (Last 24 hours) at 1/19/2019 0941 Last data filed at 1/19/2019 0700 Gross per 24 hour Intake 1283.81 ml Output 930 ml Net 353.81 ml Physical Examination:  
 
 
     
Constitutional:  chronically ill looking ENT:  Oral mucous moist, oropharynx benign. Neck supple, Resp: Decreased breath sounds at bases CV:  Regular rhythm, normal rate, no murmurs, gallops, rubs GI:  Soft, non distended, non tender. normoactive bowel sounds, no hepatosplenomegaly Musculoskeletal:  No edema, warm, 2+ pulses throughout Neurologic:  Moves all extremities. AAOx3, CN II-XII reviewed Psych:  Good insight, Not anxious nor agitated. Data Review:  
 I personally reviewed  Image and tbd Labs:  
 
Recent Labs  
  01/18/19 
0218 01/17/19 
1722 WBC 4.9 4.4 HGB 14.0 14.3 HCT 46.1 47.6* PLT 81* 85* Recent Labs  
  01/19/19 
0541 01/18/19 
0218 01/17/19 
1722 * 146* 144  
K 4.7 4.7 4.9 * 113* 111* CO2 23 24 23 BUN 68* 78* 75* CREA 2.58* 2.21* 2.25* * 69 70  
CA 6.9* 7.5* 7.7* MG 2.3  --  2.6* PHOS 4.8*  --   --   
 
Recent Labs  
  01/17/19 
1722 SGOT 33 ALT 58 * TBILI 0.5 TP 5.8* ALB 2.5*  
GLOB 3.3 No results for input(s): INR, PTP, APTT in the last 72 hours. No lab exists for component: INREXT, INREXT No results for input(s): FE, TIBC, PSAT, FERR in the last 72 hours.   
No results found for: FOL, RBCF  
 No results for input(s): PH, PCO2, PO2 in the last 72 hours. Recent Labs  
  01/18/19 
1424 01/17/19 
1722 CPK 63  --   
TROIQ  --  <0.05 No results found for: CHOL, CHOLX, CHLST, CHOLV, HDL, LDL, LDLC, DLDLP, TGLX, TRIGL, TRIGP, CHHD, CHHDX Lab Results Component Value Date/Time Glucose (POC) 119 (H) 01/19/2019 05:48 AM  
 Glucose (POC) 98 01/19/2019 12:44 AM  
 Glucose (POC) 150 (H) 01/18/2019 11:45 PM  
 Glucose (POC) 49 (LL) 01/18/2019 11:22 PM  
 Glucose (POC) 49 (LL) 01/18/2019 11:19 PM  
 
Lab Results Component Value Date/Time Color YELLOW/STRAW 01/18/2019 12:44 PM  
 Appearance CLOUDY (A) 01/18/2019 12:44 PM  
 Specific gravity 1.015 01/18/2019 12:44 PM  
 pH (UA) 5.0 01/18/2019 12:44 PM  
 Protein 100 (A) 01/18/2019 12:44 PM  
 Glucose NEGATIVE  01/18/2019 12:44 PM  
 Ketone NEGATIVE  01/18/2019 12:44 PM  
 Bilirubin NEGATIVE  01/18/2019 12:44 PM  
 Urobilinogen 0.2 01/18/2019 12:44 PM  
 Nitrites NEGATIVE  01/18/2019 12:44 PM  
 Leukocyte Esterase TRACE (A) 01/18/2019 12:44 PM  
 Epithelial cells FEW 01/18/2019 12:44 PM  
 Bacteria 1+ (A) 01/18/2019 12:44 PM  
 WBC 0-4 01/18/2019 12:44 PM  
 RBC 10-20 01/18/2019 12:44 PM  
 
 
 
Medications Reviewed:  
 
Current Facility-Administered Medications Medication Dose Route Frequency  [START ON 1/21/2019] levothyroxine (SYNTHROID) tablet 100 mcg  100 mcg Oral Once per day on Mon Tue Wed Thu Fri Sat  [START ON 1/20/2019] levothyroxine (SYNTHROID) tablet 150 mcg  150 mcg Oral every Sunday  aspirin delayed-release tablet 81 mg  81 mg Oral DAILY  glucose chewable tablet 16 g  4 Tab Oral PRN  
 dextrose (D50W) injection syrg 12.5-25 g  25-50 mL IntraVENous PRN  
 glucagon (GLUCAGEN) injection 1 mg  1 mg IntraMUSCular PRN  
 insulin lispro (HUMALOG) injection   SubCUTAneous AC&HS  sodium chloride (NS) flush 5-40 mL  5-40 mL IntraVENous Q8H  
 sodium chloride (NS) flush 5-40 mL  5-40 mL IntraVENous PRN  
  vits A and D-white pet-lanolin (A&D) ointment   Topical BID  miconazole (SECURA) 2 % extra thick cream   Topical BID  
 balsam peru-castor oil (VENELEX) ointment   Topical BID  DOPamine (INTROPIN) 800 mg in dextrose 5% 250 mL infusion  0-20 mcg/kg/min IntraVENous TITRATE  
 0.9% sodium chloride infusion  75 mL/hr IntraVENous CONTINUOUS  
 
______________________________________________________________________ EXPECTED LENGTH OF STAY: 4d 2h 
ACTUAL LENGTH OF STAY:          2 Val Forman MD

## 2019-01-19 NOTE — PROGRESS NOTES
Nephrology Progress Note [address]  
 
www. [facility]                  Phone - [phone] Patient: Zo Helton YOB: 1935     Admit Date: 1/17/2019 Date- 1/19/2019 CC: Follow up for arf Subjective: Interval History:  
-  Cr. Worse 
bp stable Na high No hypoxia ROS:- Can't access due to patient's current condition Assessment:  
· arf due to volume depletion - no hydronephrosis on renal usg. · Hypernatremia · Hypovolemia, hypotension · ckd 4- bl.cr. 2.0 from dm and htn · H/o CHF · hypocalcemia Plan:  
· Change ivf to . 45 nacl · Iv calcium gluconate · Follow bmp · Hold diuretics Physical Exam:  
GEN: NAD NECK- Supple, no thyromegaly RESP: Clear b/l, no wheezing, CVS: RRR,S1,S2 SKIN: No Rash, ABDO: soft , non tender, No hepatosplenomegaly NEURO: Can't access due to patient's current condition Care Plan discussed with: nurse Objective:  
Patient Vitals for the past 24 hrs: 
 Temp Pulse Resp BP SpO2  
01/19/19 1015  87 16 114/58 92 % 01/19/19 1000  87 14 114/58 (!) 89 % 01/19/19 0945  89 17 111/60 (!) 88 % 01/19/19 0930  87 16 124/58 90 % 01/19/19 0915  90 20 124/76 (!) 89 % 01/19/19 0900  89 17 121/53 (!) 87 % 01/19/19 0845  86 13 110/44 (!) 88 % 01/19/19 0830  89 17 115/52 (!) 88 % 01/19/19 0815  88 17 124/53 (!) 88 % 01/19/19 0800  89 18 111/51 91 % 01/19/19 0730  87 15 106/44 (!) 89 % 01/19/19 0700  88 18 115/53 91 % 01/19/19 0630    134/74 91 % 01/19/19 0600  84 14 109/51 93 % 01/19/19 0530  86 17 121/56 92 % 01/19/19 0500  86 17 119/56 92 % 01/19/19 0430  84 14 113/53 93 % 01/19/19 0400 99.2 °F (37.3 °C) 84 17 117/53 93 % 01/19/19 0330  85 17 117/57 92 % 01/19/19 0300  85 28 112/54 92 % 01/19/19 0230  84 17 110/50 92 % 01/19/19 0200  82 16 107/49 91 % 01/19/19 0130  84 23 116/57 (!) 89 % 01/19/19 0100  84 16 110/56 92 % 01/19/19 0030  83 14 107/53 91 % 01/19/19 0015  86 16 120/54 92 % 01/19/19 0000 98.8 °F (37.1 °C) 83 16 110/51 92 % 01/18/19 2345  86 16 119/52 91 % 01/18/19 2330  82 14 117/52 91 % 01/18/19 2315  82 16 114/50 92 % 01/18/19 2300  82 16 111/47 92 % 01/18/19 2245  81 16 110/47 92 % 01/18/19 2230  80 15 109/46 92 % 01/18/19 2215  81 14 108/44 91 % 01/18/19 2200  82 16 111/48 92 % 01/18/19 2145  80 15 107/49 91 % 01/18/19 2130  82 17 105/49 (!) 89 % 01/18/19 2115  80 15 108/45 90 % 01/18/19 2100  80 16 110/47 90 % 01/18/19 2045  81 16 112/51 (!) 89 % 01/18/19 2030  80 16 114/50 90 % 01/18/19 2015  79 16 110/47 91 % 01/18/19 2000 98.3 °F (36.8 °C) 79 17 113/50 91 % 01/18/19 1900  80 15 108/59 90 % 01/18/19 1800  83 16 106/50 (!) 87 % 01/18/19 1700  80 12 107/50 91 % 01/18/19 1600 96.5 °F (35.8 °C) 87 15 124/54 (!) 89 % 01/18/19 1500  66 13 (!) 81/40 90 % 01/18/19 1400  66 13 (!) 77/40 90 % 01/18/19 1300  74 15 109/53 (!) 89 % 01/18/19 1200 95.6 °F (35.3 °C) 69 15 (!) 89/60 93 % Last 3 Recorded Weights in this Encounter 01/17/19 1623 01/18/19 0658 01/19/19 0500 Weight: 68 kg (150 lb) 74 kg (163 lb 3.2 oz) 73.3 kg (161 lb 9.6 oz) 01/17 1901 - 01/19 0700 In: 1283.8 [I.V.:1283.8] Out: 1635 [[id number]] Chart reviewed. Pertinent Notes reviewed. Medication list  reviewed Current Facility-Administered Medications Medication  [START ON 1/21/2019] levothyroxine (SYNTHROID) tablet 100 mcg  [START ON 1/20/2019] levothyroxine (SYNTHROID) tablet 150 mcg  calcium gluconate 2 g in 0.9% sodium chloride 100 mL IVPB  
 0.45% sodium chloride infusion  aspirin delayed-release tablet 81 mg  
 glucose chewable tablet 16 g  
 dextrose (D50W) injection syrg 12.5-25 g  
 glucagon (GLUCAGEN) injection 1 mg  
 insulin lispro (HUMALOG) injection  sodium chloride (NS) flush 5-40 mL  sodium chloride (NS) flush 5-40 mL  vits A and D-white pet-lanolin (A&D) ointment  miconazole (SECURA) 2 % extra thick cream  
 balsam peru-castor oil (VENELEX) ointment  DOPamine (INTROPIN) 800 mg in dextrose 5% 250 mL infusion Data Review : 
Recent Labs  
  01/19/19 
0541 01/18/19 
0218 01/17/19 
1722 WBC  --  4.9 4.4 HGB  --  14.0 14.3 PLT  --  81* 85* ANEU  --  4.0 3.7 * 146* 144  
K 4.7 4.7 4.9 * 69 70 BUN 68* 78* 75* CREA 2.58* 2.21* 2.25* ALT  --   --  58 SGOT  --   --  33  
TBILI  --   --  0.5 AP  --   --  134* CA 6.9* 7.5* 7.7* MG 2.3  --  2.6* PHOS 4.8*  --   -- No results found for: CULT No results found for: SDES No results for input(s): FE, TIBC, PSAT, FERR in the last 72 hours. No results found for: PHYLLIS, CREAU 
 
RENAL USG FINDINGS: Renal sonogram shows mild diffuse renal cortical thinning with normal 
echogenicity. There is a right cortical nonshadowing 1.5 cm hyperechoic focus 
most compatible with benign angiomyolipoma. There is no apparent cyst. 
  
Renal lengths: 
Right  9.1 cm Left  9.3 cm 
  There is no hydronephrosis or caliectasis. The proximal ureters are not dilated. There is no perirenal fluid collection. 
  
The bladder is empty. Aorta and common iliac artery origins are largely obscured 
by bowel gas. IVC is unremarkable.  
  
IMPRESSION IMPRESSION: Small kidneys with normal echogenicity without hydronephrosis. 
  
 
Trace David MD 
[facility] 
 www. [facility] [employer] [address] [place], [address] Phone - [phone] Fax - [phone]

## 2019-01-19 NOTE — PROGRESS NOTES
TRANSFER - IN REPORT: 
 
Verbal report received from Chetan Chung RN(name) on Tracy Deshpadne  being received from ICU (unit) for routine progression of care Report consisted of patients Situation, Background, Assessment and  
Recommendations(SBAR). Information from the following report(s) SBAR, Kardex, Intake/Output, MAR and Recent Results was reviewed with the receiving nurse. Opportunity for questions and clarification was provided. Assessment completed upon patients arrival to unit and care assumed.

## 2019-01-19 NOTE — PROGRESS NOTES
Problem: Falls - Risk of 
Goal: *Absence of Falls Document Leana Zelaya Fall Risk and appropriate interventions in the flowsheet. Outcome: Progressing Towards Goal 
Fall Risk Interventions: 
Mobility Interventions: Bed/chair exit alarm, Communicate number of staff needed for ambulation/transfer Mentation Interventions: Bed/chair exit alarm, Door open when patient unattended, Room close to nurse's station Elimination Interventions: Call light in reach, Toileting schedule/hourly rounds

## 2019-01-19 NOTE — PROGRESS NOTES
Cardiology Progress Note 1/19/2019 11:52 AM 
 
Admit Date: 1/17/2019 Admit Diagnosis: Acute exacerbation of CHF (congestive heart failure) ([facility] [address] Hypoxia; Inability to walk;Acute-on-chronic kidney injury (HCC);SOB (shortness of breath) Assessment:  
 
Principal Problem: 
  Acute exacerbation of CHF (congestive heart failure) ([facility] [address] (1/17/2019) Active Problems: 
  SOB (shortness of breath) (1/17/2019) Hypoxia (1/17/2019) Inability to walk (1/17/2019) Acute-on-chronic kidney injury ([facility] [address] (1/17/2019) Obtundation (1/19/2019) Plan: On dopamine would d/c and pursue palliative care, could move to floor med. Angie Sethi MD 
[phone]  Cell Subjective:  
 
Drake Sheth does not respond. She reports symptoms are unchanged since yesterday. ROS: negative except as noted above. Objective:  
 
 
Visit Vitals /58 Pulse 87 Temp 99.2 °F (37.3 °C) Resp 16 Ht 5' 1\" (1.549 m) Wt 73.3 kg (161 lb 9.6 oz) SpO2 92% BMI 30.53 kg/m² Current Facility-Administered Medications Medication Dose Route Frequency  [START ON 1/21/2019] levothyroxine (SYNTHROID) tablet 100 mcg  100 mcg Oral Once per day on Mon Tue Wed Thu Fri Sat  [START ON 1/20/2019] levothyroxine (SYNTHROID) tablet 150 mcg  150 mcg Oral every Sunday  calcium gluconate 2 g in 0.9% sodium chloride 100 mL IVPB  2 g IntraVENous ONCE  
 0.45% sodium chloride infusion  75 mL/hr IntraVENous CONTINUOUS  
 aspirin delayed-release tablet 81 mg  81 mg Oral DAILY  glucose chewable tablet 16 g  4 Tab Oral PRN  
 dextrose (D50W) injection syrg 12.5-25 g  25-50 mL IntraVENous PRN  
 glucagon (GLUCAGEN) injection 1 mg  1 mg IntraMUSCular PRN  
 insulin lispro (HUMALOG) injection   SubCUTAneous AC&HS  sodium chloride (NS) flush 5-40 mL  5-40 mL IntraVENous Q8H  
 sodium chloride (NS) flush 5-40 mL  5-40 mL IntraVENous PRN  
  vits A and D-white pet-lanolin (A&D) ointment   Topical BID  miconazole (SECURA) 2 % extra thick cream   Topical BID  
 balsam peru-castor oil (VENELEX) ointment   Topical BID  DOPamine (INTROPIN) 800 mg in dextrose 5% 250 mL infusion  0-20 mcg/kg/min IntraVENous TITRATE Physical Exam: 
Visit Vitals /58 Pulse 87 Temp 99.2 °F (37.3 °C) Resp 16 Ht 5' 1\" (1.549 m) Wt 73.3 kg (161 lb 9.6 oz) SpO2 92% BMI 30.53 kg/m² General Appearance:  Well developed, well nourished, obtunded individual in no acute distress. Ears/Nose/Mouth/Throat:   Hearing not assessed. Neck: Supple. Chest:   Lungs clear to auscultation bilaterally. Cardiovascular:  Regular rate and rhythm, S1, S2 normal, no murmur. Abdomen:   Soft, non-tender, bowel sounds are active. Extremities: No edema bilaterally. Skin: Warm and dry. Telemetry: normal sinus rhythm Data Review:  
 
Labs:   
Recent Results (from the past 24 hour(s)) URINALYSIS W/MICROSCOPIC Collection Time: 01/18/19 12:44 PM  
Result Value Ref Range Color YELLOW/STRAW Appearance CLOUDY (A) CLEAR Specific gravity 1.015 1.003 - 1.030    
 pH (UA) 5.0 5.0 - 8.0 Protein 100 (A) NEG mg/dL Glucose NEGATIVE  NEG mg/dL Ketone NEGATIVE  NEG mg/dL Bilirubin NEGATIVE  NEG Blood LARGE (A) NEG Urobilinogen 0.2 0.2 - 1.0 EU/dL Nitrites NEGATIVE  NEG Leukocyte Esterase TRACE (A) NEG    
 WBC 0-4 0 - 4 /hpf  
 RBC 10-20 0 - 5 /hpf Epithelial cells FEW FEW /lpf Bacteria 1+ (A) NEG /hpf URINE CULTURE HOLD SAMPLE Collection Time: 01/18/19 12:44 PM  
Result Value Ref Range Urine culture hold URINE ON HOLD IN MICROBIOLOGY DEPT FOR 3 DAYS. IF UNPRESERVED URINE IS SUBMITTED, IT CANNOT BE USED FOR ADDITIONAL TESTING AFTER 24 HRS, RECOLLECTION WILL BE REQUIRED. GLUCOSE, POC Collection Time: 01/18/19 12:47 PM  
Result Value Ref Range Glucose (POC) 51 (L) 65 - 100 mg/dL Performed by Donato Loo, POC Collection Time: 01/18/19  1:07 PM  
Result Value Ref Range Glucose (POC) 170 (H) 65 - 100 mg/dL Performed by Halie Grimaldo Collection Time: 01/18/19  2:24 PM  
Result Value Ref Range Cortisol, random 29.6 ug/dL CK Collection Time: 01/18/19  2:24 PM  
Result Value Ref Range CK 63 26 - 192 U/L  
GLUCOSE, POC Collection Time: 01/18/19  4:31 PM  
Result Value Ref Range Glucose (POC) 109 (H) 65 - 100 mg/dL Performed by Donato Loo, POC Collection Time: 01/18/19 11:19 PM  
Result Value Ref Range Glucose (POC) 49 (LL) 65 - 100 mg/dL Performed by RAÚL MANI   
GLUCOSE, POC Collection Time: 01/18/19 11:22 PM  
Result Value Ref Range Glucose (POC) 49 (LL) 65 - 100 mg/dL Performed by RAÚL MANI   
GLUCOSE, POC Collection Time: 01/18/19 11:45 PM  
Result Value Ref Range Glucose (POC) 150 (H) 65 - 100 mg/dL Performed by RAÚL MANI   
GLUCOSE, POC Collection Time: 01/19/19 12:44 AM  
Result Value Ref Range Glucose (POC) 98 65 - 100 mg/dL Performed by Cameron TRONCOSO, BASIC Collection Time: 01/19/19  5:41 AM  
Result Value Ref Range Sodium 147 (H) 136 - 145 mmol/L Potassium 4.7 3.5 - 5.1 mmol/L Chloride 114 (H) 97 - 108 mmol/L  
 CO2 23 21 - 32 mmol/L Anion gap 10 5 - 15 mmol/L Glucose 131 (H) 65 - 100 mg/dL BUN 68 (H) 6 - 20 MG/DL Creatinine 2.58 (H) 0.55 - 1.02 MG/DL  
 BUN/Creatinine ratio 26 (H) 12 - 20 GFR est AA 22 (L) >60 ml/min/1.73m2 GFR est non-AA 18 (L) >60 ml/min/1.73m2 Calcium 6.9 (L) 8.5 - 10.1 MG/DL MAGNESIUM Collection Time: 01/19/19  5:41 AM  
Result Value Ref Range Magnesium 2.3 1.6 - 2.4 mg/dL PHOSPHORUS Collection Time: 01/19/19  5:41 AM  
Result Value Ref Range  Phosphorus 4.8 (H) 2.6 - 4.7 MG/DL  
TSH 3RD GENERATION  
 Collection Time: 01/19/19  5:41 AM  
Result Value Ref Range TSH 0.17 (L) 0.36 - 3.74 uIU/mL GLUCOSE, POC Collection Time: 01/19/19  5:48 AM  
Result Value Ref Range Glucose (POC) 119 (H) 65 - 100 mg/dL Performed by COLLEEN Tapia MD

## 2019-01-19 NOTE — PROGRESS NOTES
PCCM 
Chart reviewed CHF exacerbation Per cards ok to go out ICU For palliative eval 
Available as needed ICU issues- call for questions

## 2019-01-20 NOTE — PROGRESS NOTES
Problem: Pressure Injury - Risk of 
Goal: *Prevention of pressure injury Document Bandar Scale and appropriate interventions in the flowsheet. Outcome: Progressing Towards Goal 
Pressure Injury Interventions: 
Sensory Interventions: Discuss PT/OT consult with provider, Float heels, Keep linens dry and wrinkle-free, turn team, q2, Moisture Interventions: Absorbent underpads, Apply protective barrier, creams and emollients, Check for incontinence Q2 hours and as needed, Maintain skin hydration (lotion/cream), Minimize layers Activity Interventions: Pressure redistribution bed/mattress(bed type) Mobility Interventions: HOB 30 degrees or less, Pressure redistribution bed/mattress (bed type), PT/OT evaluation Nutrition Interventions: Document food/fluid/supplement intake Friction and Shear Interventions: HOB 30 degrees or less, Lift sheet, Minimize layers

## 2019-01-20 NOTE — PROGRESS NOTES
Cm received consult for hospice. Cm contacted staff nurse and the patient/family would like a referral to [facility]. A referral was sent and cm contacted Laura the on call liaison to inform her of the consult. Cm also reviewed Hospitalist progress note from today and the family would like a meeting with Palliative Care/ Hospice tomorrow. Cm informed hospice within the referral that the family would like a scheduled meeting tomorrow. No other discharge needs identified at this time.

## 2019-01-20 NOTE — PROGRESS NOTES
Bedside shift change report given to Fortino Alpers, RN (oncoming nurse) by Caroline Stallworth RN (offgoing nurse). Report included the following information SBAR, Kardex, Intake/Output and MAR.  
1150: Rounded on pt, she was in bed in what looked like a very uncomfortable position--HOB elevated 40 deg and \"slumped over\" to the right side with head on the railing. Asked if she wanted to be repositioned and she clearly stated \"No, I do not\"

## 2019-01-20 NOTE — PROGRESS NOTES
Hospitalist Progress Note Particnirmala Carcamo MD 
Answering service: [phone] OR [phone] from in house phone Date of Service:  2019 NAME:  Simone Shipman :  1935 MRN:  [id number] Admission Summary: An 50-year-old white female with past medical history of chronic kidney disease, arthritis, COPD, cor pulmonale, CHF, hypertension, type 2 diabetes mellitus, hypothyroidism, gait abnormality presented to the emergency department from home via EMS with chief complaint of shortness of breath. No generalized weakness. The patient is a limited historian, majority of history was obtained from review of ED and electronic medical record. Per the collective reports, the patient's sister tried to call the patient, became worried when she had not received an answer. She reportedly went to check on the patient and found that the patient was unable to answer the phone. Patient reports that she fell. Interval history / Subjective:  
Patient is seen and examined at bedside. She is very lethargic. She was agitated and pulling lines and rails. Discussed with nursing staff, she was agitated all night. Assessment & Plan: 1. Chronic congestive heart failure NYHA 3-4 :  
- does not look volume overloaded her MM dry - per sister not eating / drinking well last few days 
- her BP low. hold BP meds and Diuretics 
- gentle hydration for now 
- higher BNP is sometime non specific in renal failure  
-cardiology tried dopamine drip yesterday but today's notes from Dr. Ha Labs states : \" On dopamine would d/c and pursue palliative care\" 2. Chronic hypoxic respiratory failure :  
- unclear etiology uses baseline home o2 . Possibly due to CHF 
-  Continue oxygen therapy and pulse telemetry monitoring. 
- CXR - mild interstitial edema and small pleural effusions. - currently placed on ventimask 3. Generalized weakness/debility. - Place on fall precautions. Reported fall at home get CK 
-  patient lives alone and not able to care for herself. 4.  Type 2 diabetes mellitus. - Place on Humalog insulin correction coverage, schedule Accu-Chek and check hemoglobin A1c level. lantus on hold 7. Bilateral lower extremity edema. Keep legs elevated at rest.  Continue strict I's and O's. 
 
8.  Thrombocytopenia.  will monitor. No signs of bleeding. Hold any A/C 
 
9. Dizziness. Place on fall precautions. May be due to hypotension 
-  CT of the head without contrast to rule out any acute process. 10.  Bradycardia. Place hold parameters for beta blockers. Was on coreg 11. Acute superimposed on chronic kidney disease. Hypernatremia 
- nephrology on board - Renal us: Small kidneys with normal echogenicity without hydronephrosis. - IVF per nephro 
- will monitor renal function 12. Hypothermia  resolved 
- could be due to sepsis 
- TSH low - levothyroxine adjusted - provide karen maher Palliative care consulted 1/19: Discussed in length with sister Abi Guevara regarding poor prognosis and she is in agreement. She does not wish to do anything aggressive measures and agreed to talk to hospice. She would prefer if the patient can be inpatient hospice. Hospice consult placed. Downgraded to remote tele 1/20: hoping to have palliative care  and hospice discussions tomorrow with sister Abi Guevara. Code status: DNR 
DVT prophylaxis: SCD Care Plan discussed with: Patient/Family and Nurse Disposition: TBD Hospital Problems  Date Reviewed: 1/19/2019 Codes Class Noted POA Obtundation ICD-10-CM: R40.1 ICD-9-CM: 780.09  1/19/2019 Unknown  
   
 SOB (shortness of breath) ICD-10-CM: R06.02 
ICD-9-CM: 786.05  1/17/2019 Unknown Hypoxia ICD-10-CM: R09.02 
ICD-9-CM: 799.02  1/17/2019 Unknown Inability to walk ICD-10-CM: R26.2 ICD-9-CM: 719.7  1/17/2019 Unknown * (Principal) Acute exacerbation of CHF (congestive heart failure) ([facility]) ICD-10-CM: I50.9 ICD-9-CM: 428.0  1/17/2019 Unknown Acute-on-chronic kidney injury ([facility] [address] ICD-10-CM: N17.9, N18.9 ICD-9-CM: 584.9, 585.9  1/17/2019 Unknown Review of Systems:  
Review of systems not obtained due to patient factors. Vital Signs:  
 Last 24hrs VS reviewed since prior progress note. Most recent are: 
Visit Vitals /60 (BP 1 Location: Right arm) Pulse 76 Temp 96.5 °F (35.8 °C) Resp 18 Ht 5' 1\" (1.549 m) Wt 70.4 kg (155 lb 4.8 oz) SpO2 99% BMI 29.34 kg/m² Intake/Output Summary (Last 24 hours) at 1/20/2019 1035 Last data filed at 1/19/2019 1200 Gross per 24 hour Intake 11.2 ml Output 50 ml Net -38.8 ml Physical Examination:  
 
 
     
Constitutional:  chronically ill looking ENT:  Oral mucous moist, oropharynx benign. Neck supple Resp: Decreased breath sounds at bases CV:  Regular rhythm, normal rate GI:  Soft, non distended, non tender. normoactive bowel sounds Musculoskeletal:  No edema, warm, 2+ pulses throughout Neurologic:  Moves all extremities. Grossly intact. lethargic Data Review:  
 I personally reviewed  Image and tbd Labs:  
 
Recent Labs  
  01/18/19 
0218 01/17/19 
1722 WBC 4.9 4.4 HGB 14.0 14.3 HCT 46.1 47.6* PLT 81* 85* Recent Labs  
  01/19/19 
0541 01/18/19 
0218 01/17/19 
1722 * 146* 144  
K 4.7 4.7 4.9 * 113* 111* CO2 23 24 23 BUN 68* 78* 75* CREA 2.58* 2.21* 2.25* * 69 70  
CA 6.9* 7.5* 7.7* MG 2.3  --  2.6* PHOS 4.8*  --   --   
 
Recent Labs  
  01/19/19 
0541 01/17/19 
1722 SGOT  --  33 ALT  --  58  
AP  --  134* TBILI  --  0.5 TP  --  5.8* ALB 3.1* 2.5*  
GLOB  --  3.3 No results for input(s): INR, PTP, APTT in the last 72 hours. No lab exists for component: INREXT, INREXT No results for input(s): FE, TIBC, PSAT, FERR in the last 72 hours. No results found for: FOL, RBCF No results for input(s): PH, PCO2, PO2 in the last 72 hours. Recent Labs  
  01/18/19 
1424 01/17/19 
1722 CPK 63  --   
TROIQ  --  <0.05 No results found for: CHOL, CHOLX, CHLST, CHOLV, HDL, LDL, LDLC, DLDLP, TGLX, TRIGL, TRIGP, CHHD, CHHDX Lab Results Component Value Date/Time Glucose (POC) 88 01/20/2019 10:31 AM  
 Glucose (POC) 85 01/20/2019 06:32 AM  
 Glucose (POC) 153 (H) 01/19/2019 10:16 PM  
 Glucose (POC) 65 01/19/2019 09:54 PM  
 Glucose (POC) 127 (H) 01/19/2019 04:45 PM  
 
Lab Results Component Value Date/Time Color YELLOW/STRAW 01/18/2019 12:44 PM  
 Appearance CLOUDY (A) 01/18/2019 12:44 PM  
 Specific gravity 1.015 01/18/2019 12:44 PM  
 pH (UA) 5.0 01/18/2019 12:44 PM  
 Protein 100 (A) 01/18/2019 12:44 PM  
 Glucose NEGATIVE  01/18/2019 12:44 PM  
 Ketone NEGATIVE  01/18/2019 12:44 PM  
 Bilirubin NEGATIVE  01/18/2019 12:44 PM  
 Urobilinogen 0.2 01/18/2019 12:44 PM  
 Nitrites NEGATIVE  01/18/2019 12:44 PM  
 Leukocyte Esterase TRACE (A) 01/18/2019 12:44 PM  
 Epithelial cells FEW 01/18/2019 12:44 PM  
 Bacteria 1+ (A) 01/18/2019 12:44 PM  
 WBC 0-4 01/18/2019 12:44 PM  
 RBC 10-20 01/18/2019 12:44 PM  
 
 
 
Medications Reviewed:  
 
Current Facility-Administered Medications Medication Dose Route Frequency  LORazepam (ATIVAN) injection 1 mg  1 mg IntraVENous Q4H PRN  
 [START ON 1/21/2019] levothyroxine (SYNTHROID) tablet 100 mcg  100 mcg Oral Once per day on Mon Tue Wed Thu Fri Sat  levothyroxine (SYNTHROID) tablet 150 mcg  150 mcg Oral every Sunday  dextrose 5 % - 0.45% NaCl infusion  50 mL/hr IntraVENous CONTINUOUS  
 aspirin delayed-release tablet 81 mg  81 mg Oral DAILY  glucose chewable tablet 16 g  4 Tab Oral PRN  
 dextrose (D50W) injection syrg 12.5-25 g  25-50 mL IntraVENous PRN  
 glucagon (GLUCAGEN) injection 1 mg  1 mg IntraMUSCular PRN  
 insulin lispro (HUMALOG) injection   SubCUTAneous AC&HS  
  sodium chloride (NS) flush 5-40 mL  5-40 mL IntraVENous Q8H  
 sodium chloride (NS) flush 5-40 mL  5-40 mL IntraVENous PRN  
 vits A and D-white pet-lanolin (A&D) ointment   Topical BID  miconazole (SECURA) 2 % extra thick cream   Topical BID  
 balsam peru-castor oil (VENELEX) ointment   Topical BID  
 
______________________________________________________________________ EXPECTED LENGTH OF STAY: 4d 2h 
ACTUAL LENGTH OF STAY:          3 Lakeisha Koo MD

## 2019-01-21 NOTE — CDMP QUERY
CMS considers documentation to be conflicting in nature when one condition is diagnosed as two different conditions by the same or different physicians. In this instance, the patient presented with weakness, SOB despite using home oxygen. CXR:  Cardiomegaly, mild interstitial edema and small pleural effusions. .. On the progress note, Dr. Bharath Neely documents that the patient has Cor pulmonale with right ventricular failure and noted by Dr. Tyrel Zelaya has having acute exacerbation of CHF  but the same condition is documented as Chronic congestive heart failure NYHA 3-4 by Dr. Jennifer Burks on his/her progress notes. Please clarify the patient's condition if possible: 
 
=>Acute on chronic diastolic heart failure w/cor pulmonale (POA) in the setting of Lasix IV (ED), Dopamine gtt and Cardiology consulted =>Chronic diastolic heart failure (POA) in the setting Lasix IV (ED), Dopamine gtt and Cardiology consulted 
=>Other Explanation (please specify) =>Unable to Determine The medical record reflects the following:  
 
Risk Factors:  Cor pulmonale, CHF, Bumex & Coreg-PTA Clinical Indicators:  Pt presented with increased SOB, general weakness. Cardiomegaly, mild interstitial edema and small pleural effusions. Pro BNP 60450 Treatment: Lasix (40) IV (ED), Bumex po, Dopamine gtt, Cardiology consulted Please clarify and document your clinical opinion in the progress notes and discharge summary including the definitive and/or presumptive diagnosis, (suspected or probable), related to the above clinical findings. Please include clinical findings supporting your diagnosis. Thank you, Terence Fontaine RN, MSN, [facility], [address] 
[phone]

## 2019-01-21 NOTE — CONSULTS
Palliative medicine Will see if there are palliative needs after hospice meets w/ family today at 2pm.

## 2019-01-21 NOTE — PROGRESS NOTES
Nephrology Progress Note Charmaine Seek Date of Admission : 1/17/2019 CC:  Follow up for MORGAN Assessment and Plan MORGAN on CKD: 
- this appears to be volume depletion from poor po intake + diuresis 
- no obstruction on CT 
- cont current care 
  
CKD IV: 
- baseline Cr 1.8 to 2 
- from DM and HTN 
  
Hypotension: 
- resolved 
  
Hypernatremia: 
- on D5 1/2 NS now 
  HFpEF: 
- stable 
  
Hx of HTN 
  
Hypothyroidism: 
- on synthroid 
  
DM2: 
- on insulin Plans for hospice eval.  Will sign off. Please call us back if anything changes Interval History: 
Seen and examined. Plans noted for hospice. No labs since 1/19. On NRB this AM.   Remains altered on exam. 
 
Current Medications: all current  Medications have been eviewed in [facility] Review of Systems: Review of systems not obtained due to patient factors. Objective: 
Vitals:   
Vitals:  
 01/20/19 2259 01/21/19 0300 01/21/19 0327 01/21/19 0825 BP:  117/62  123/61 Pulse:  73  75 Resp:  16  18 Temp:  98 °F (36.7 °C)  98.2 °F (36.8 °C) SpO2: 99% 96%  97% Weight:   75.5 kg (166 lb 6.4 oz) Height:      
 
Intake and Output: 
No intake/output data recorded. 01/19 1901 - 01/21 0700 In: -  
Out: 800 [Urine:800] Physical Examination:Pt intubated     No 
General:  confused, lethargic Neck:  Supple, no mass Resp:  Lungs CTA B/L, no wheezing , normal respiratory effort CV:  RRR,  no murmur or rub, 1-2+ LE edema GI:  Soft, NT, + Bowel sounds, no hepatosplenomegaly Neurologic:  lethargic Psych:             Unable to assess Skin:  No Rash 
 
[]    High complexity decision making was performed 
[]    Patient is at high-risk of decompensation with multiple organ involvement Lab Data Personally Reviewed: I have reviewed all the pertinent labs, microbiology data and radiology studies during assessment. Recent Labs  
  01/19/19 
0541 * K 4.7 * CO2 23 * BUN 68* CREA 2.58* CA 6.9*  
MG 2.3 PHOS 4.8* ALB 3.1* No results for input(s): WBC, HGB, HCT, PLT, HGBEXT, HCTEXT, PLTEXT in the last 72 hours. No results found for: SDES Lab Results Component Value Date/Time Culture result: NO GROWTH 2 DAYS 01/18/2019 12:44 PM  
 
Recent Results (from the past 24 hour(s)) GLUCOSE, POC Collection Time: 01/20/19 10:31 AM  
Result Value Ref Range Glucose (POC) 88 65 - 100 mg/dL Performed by Dea Armendariz[address], POC Collection Time: 01/20/19  3:51 PM  
Result Value Ref Range Glucose (POC) 99 65 - 100 mg/dL Performed by Brooke Lozano GLUCOSE, POC Collection Time: 01/20/19  9:37 PM  
Result Value Ref Range Glucose (POC) 105 (H) 65 - 100 mg/dL Performed by Steffany Espinosa GLUCOSE, POC Collection Time: 01/21/19  6:27 AM  
Result Value Ref Range Glucose (POC) 101 (H) 65 - 100 mg/dL Performed by Steffany Espinosa Total time spent with patient:  xxx   min. Care Plan discussed with: 
Patient Family RN Consulting Physician [facility],      
 
I have reviewed the flowsheets. Chart and Pertinent Notes have been reviewed. No change in PMH ,family and social history from Consult note. Anuradha Copeland MD 
[address] [place] [address], [address] [facility] Phone - [phone] Fax - [phone] 
[email or web address]

## 2019-01-21 NOTE — PROGRESS NOTES
Bedside shift change report given to [address] (oncoming nurse) by Saturnino Navas (offgoing nurse). Report included the following information SBAR, Kardex, MAR and Recent Results.

## 2019-01-21 NOTE — PROGRESS NOTES
Pt is refusing bed exit alarm. Risk of falls and related injuries up to and including death explained to patient. She verbalized understanding and continues to refuse the bed exit alarm. Alarm turned off.

## 2019-01-21 NOTE — PROGRESS NOTES
Bedside shift change report given to Noel Theodore (oncoming nurse) by Chandler Morris (offgoing nurse). Report included the following information SBAR, Kardex and MAR.

## 2019-01-21 NOTE — PROGRESS NOTES
Removed lopez, weaned patient off of venti-mask, patient O2 sats at 95% on 6 liters nasal cannula, appears comfortable. 1930 Patient still with strong O2 sats, 95% 6 liters nasal cannula. Ate all of her dinner with assistance.

## 2019-01-21 NOTE — HOSPICE
[facility] 
[phone] Nursing Note Patient Name: Deneen Heath YOB: 1935 Age: 81 y.o. [facility] RN Note:  Hospice consult noted. Chart reviewed. Hospice Consult noted. Contact made to sister Amy Almendarez [phone]. Noemi Shepherd requested informational meeting today, 1/21 at 2:00pm.  [id number] will review patient's status as appropriate. Will meet with family  at bedside. Thank you for the opportunity to care for this patient and family. Please contact Willian Solis at [phone] with any questions or concerns.

## 2019-01-21 NOTE — CDMP QUERY
There is documentation of by the wound care nurse on 01/18 of the following: POA Left heel, Pressure Injury DTI:  0.8 X 0.8 x 0 cm non blanching purple. POA Right heel, Pressure Injury DTI 1.5 x 1.5 x 0 cm diffuse non blanching purple. POA Left buttock, Pressure Injury DTI:  2 x 2 x 0 cm; non blanching purple POA Right buttock, Pressure Injury DTI:  3 x 0.5 0 cm; non blanching purple Based on the need for increased specificity in documentation please include the following components in your documentation regarding: SKIN ULCERS: 
 
Specify the Type, Site, Stage and if POA or Hospital Acquired. Type: Pressure/decubitus, stasis, diabetic, neuropathic Site: ankle, buttock, elbow, heel, hip, lower back, upper back Stage: I, II, III, IV (unstageable, suspect deep tissue injury) Stage II:  Present on admission (POA) or hospital acquired Thank you, Maroi Nelson, RN, MSN, [facility], [address] [facility] [place] 
[phone]

## 2019-01-21 NOTE — PROGRESS NOTES
Problem: Falls - Risk of 
Goal: *Absence of Falls Document Cy Palm Fall Risk and appropriate interventions in the flowsheet. Outcome: Progressing Towards Goal 
Fall Risk Interventions: 
Mobility Interventions: Bed/chair exit alarm Mentation Interventions: Bed/chair exit alarm, Door open when patient unattended, Room close to nurse's station Medication Interventions: Patient to call before getting OOB, Teach patient to arise slowly Elimination Interventions: Bed/chair exit alarm, Call light in reach

## 2019-01-21 NOTE — PROGRESS NOTES
Hospitalist Progress Note Claudetta Hawthorne, MD 
Answering service: [phone] OR [phone] from in house phone Date of Service:  2019 NAME:  Clarinda Duverney :  1935 MRN:  [id number] Admission Summary: An 45-year-old white female with past medical history of chronic kidney disease, arthritis, COPD, cor pulmonale, CHF, hypertension, type 2 diabetes mellitus, hypothyroidism, gait abnormality presented to the emergency department from home via EMS with chief complaint of shortness of breath. No generalized weakness. The patient is a limited historian, majority of history was obtained from review of ED and electronic medical record. Per the collective reports, the patient's sister tried to call the patient, became worried when she had not received an answer. She reportedly went to check on the patient and found that the patient was unable to answer the phone. Patient reports that she fell. Interval history / Subjective:  
Patient is seen and examined at bedside. She is lethargic but much more awake than yesterday. Pulling off her oxygen. She denied any pain. Assessment & Plan: 1. Chronic congestive heart failure NYHA 3-4 :  
Acute on chronic diastolic heart failure w/cor pulmonale (POA) in the setting of Lasix IV (ED), Dopamine gtt and Cardiology consulted 
- does not look volume overloaded her MM dry - per sister not eating / drinking well last few days 
- her BP low. hold BP meds and Diuretics 
- gentle hydration for now 
- higher BNP is sometime non specific in renal failure  
-cardiology tried dopamine drip  but  notes from Dr. Mukesh Pro states : \" On dopamine would d/c and pursue palliative care\" 2. Chronic hypoxic respiratory failure :  
- unclear etiology uses baseline home o2 . Possibly due to CHF 
-  Continue oxygen therapy and pulse telemetry monitoring. - CXR - mild interstitial edema and small pleural effusions. - currently placed on ventimask 3. Generalized weakness/debility.   
- Place on fall precautions. Reported fall at home get CK 
-  patient lives alone and not able to care for herself. 4.  Type 2 diabetes mellitus. - Place on Humalog insulin correction coverage, schedule Accu-Chek and check hemoglobin A1c level. lantus on hold 7. Bilateral lower extremity edema. Keep legs elevated at rest.  Continue strict I's and O's. 
 
8.  Thrombocytopenia.  will monitor. No signs of bleeding. Hold any A/C 
 
9. Dizziness. Place on fall precautions. May be due to hypotension 
-  CT of the head without contrast to rule out any acute process. 10.  Bradycardia. Place hold parameters for beta blockers. Was on coreg 11. Acute superimposed on chronic kidney disease. Hypernatremia 
- nephrology on board - Renal us: Small kidneys with normal echogenicity without hydronephrosis. - IVF per nephro 
- will monitor renal function 12. Hypothermia  resolved 
- could be due to sepsis 
- TSH low - levothyroxine adjusted - provide karen maher Palliative care consulted 1/19: Discussed in length with sister Joann Romero regarding poor prognosis and she is in agreement. She does not wish to do anything aggressive measures and agreed to talk to hospice. She would prefer if the patient can be inpatient hospice. Hospice consult placed. Downgraded to remote tele 1/20: hoping to have palliative care  and hospice discussions tomorrow with sister Joann Romero 
 
1/21: hospice meeting at 2pm today Addendum: since patient improved in her mentation today, sister has decide to opt for rehab than hospice. Code status: DNR 
DVT prophylaxis: SCD Care Plan discussed with: Patient/Family and Nurse Disposition: TBD. Hospital Problems  Date Reviewed: 1/19/2019 Codes Class Noted POA Obtundation ICD-10-CM: R40.1 ICD-9-CM: 780.09  1/19/2019 Unknown  
   
 SOB (shortness of breath) ICD-10-CM: R06.02 
ICD-9-CM: 786.05  1/17/2019 Unknown Hypoxia ICD-10-CM: R09.02 
ICD-9-CM: 799.02  1/17/2019 Unknown Inability to walk ICD-10-CM: R26.2 ICD-9-CM: 719.7  1/17/2019 Unknown * (Principal) Acute exacerbation of CHF (congestive heart failure) (HCC) ICD-10-CM: I50.9 ICD-9-CM: 428.0  1/17/2019 Unknown Acute-on-chronic kidney injury ([facility] [address] ICD-10-CM: N17.9, N18.9 ICD-9-CM: 584.9, 585.9  1/17/2019 Unknown Review of Systems:  
Review of systems not obtained due to patient factors. Vital Signs:  
 Last 24hrs VS reviewed since prior progress note. Most recent are: 
Visit Vitals /61 (BP 1 Location: Left arm) Pulse 75 Temp 98.2 °F (36.8 °C) Resp 18 Ht 5' 1\" (1.549 m) Wt 75.5 kg (166 lb 6.4 oz) SpO2 97% BMI 31.44 kg/m² Intake/Output Summary (Last 24 hours) at 1/21/2019 1410 Last data filed at 1/21/2019 0630 Gross per 24 hour Intake  Output 350 ml Net -350 ml Physical Examination:  
 
 
     
Constitutional:  chronically ill looking ENT:  Oral mucous moist, oropharynx benign. Neck supple Resp: Decreased breath sounds at bases CV:  Regular rhythm, normal rate GI:  Soft, non distended, non tender. normoactive bowel sounds Musculoskeletal:  warm, 2+ pulses throughout Neurologic:  Moves all extremities. Grossly intact. lethargic Data Review:  
 I personally reviewed  Image and tbd Labs:  
 
No results for input(s): WBC, HGB, HCT, PLT, HGBEXT, HCTEXT, PLTEXT, HGBEXT, HCTEXT, PLTEXT in the last 72 hours. Recent Labs  
  01/19/19 
0541 * K 4.7 * CO2 23 BUN 68* CREA 2.58* * CA 6.9*  
MG 2.3 PHOS 4.8* Recent Labs  
  01/19/19 
0541 ALB 3.1* No results for input(s): INR, PTP, APTT in the last 72 hours.  
 
No lab exists for component: INREXT, INREXT  
 No results for input(s): FE, TIBC, PSAT, FERR in the last 72 hours. No results found for: FOL, RBCF No results for input(s): PH, PCO2, PO2 in the last 72 hours. Recent Labs  
  01/18/19 
1424 CPK 63 No results found for: CHOL, CHOLX, CHLST, CHOLV, HDL, LDL, LDLC, DLDLP, TGLX, TRIGL, TRIGP, CHHD, CHHDX Lab Results Component Value Date/Time Glucose (POC) 159 (H) 01/21/2019 11:12 AM  
 Glucose (POC) 101 (H) 01/21/2019 06:27 AM  
 Glucose (POC) 105 (H) 01/20/2019 09:37 PM  
 Glucose (POC) 99 01/20/2019 03:51 PM  
 Glucose (POC) 88 01/20/2019 10:31 AM  
 
Lab Results Component Value Date/Time Color YELLOW/STRAW 01/18/2019 12:44 PM  
 Appearance CLOUDY (A) 01/18/2019 12:44 PM  
 Specific gravity 1.015 01/18/2019 12:44 PM  
 pH (UA) 5.0 01/18/2019 12:44 PM  
 Protein 100 (A) 01/18/2019 12:44 PM  
 Glucose NEGATIVE  01/18/2019 12:44 PM  
 Ketone NEGATIVE  01/18/2019 12:44 PM  
 Bilirubin NEGATIVE  01/18/2019 12:44 PM  
 Urobilinogen 0.2 01/18/2019 12:44 PM  
 Nitrites NEGATIVE  01/18/2019 12:44 PM  
 Leukocyte Esterase TRACE (A) 01/18/2019 12:44 PM  
 Epithelial cells FEW 01/18/2019 12:44 PM  
 Bacteria 1+ (A) 01/18/2019 12:44 PM  
 WBC 0-4 01/18/2019 12:44 PM  
 RBC 10-20 01/18/2019 12:44 PM  
 
 
 
Medications Reviewed:  
 
Current Facility-Administered Medications Medication Dose Route Frequency  LORazepam (ATIVAN) injection 1 mg  1 mg IntraVENous Q4H PRN  
 levothyroxine (SYNTHROID) tablet 100 mcg  100 mcg Oral Once per day on Mon Tue Wed Thu Fri Sat  levothyroxine (SYNTHROID) tablet 150 mcg  150 mcg Oral every Sunday  dextrose 5 % - 0.45% NaCl infusion  50 mL/hr IntraVENous CONTINUOUS  
 aspirin delayed-release tablet 81 mg  81 mg Oral DAILY  glucose chewable tablet 16 g  4 Tab Oral PRN  
 dextrose (D50W) injection syrg 12.5-25 g  25-50 mL IntraVENous PRN  
 glucagon (GLUCAGEN) injection 1 mg  1 mg IntraMUSCular PRN  
 insulin lispro (HUMALOG) injection   SubCUTAneous AC&HS  
  sodium chloride (NS) flush 5-40 mL  5-40 mL IntraVENous Q8H  
 sodium chloride (NS) flush 5-40 mL  5-40 mL IntraVENous PRN  
 vits A and D-white pet-lanolin (A&D) ointment   Topical BID  miconazole (SECURA) 2 % extra thick cream   Topical BID  
 balsam peru-castor oil (VENELEX) ointment   Topical BID  
 
______________________________________________________________________ EXPECTED LENGTH OF STAY: 4d 2h 
ACTUAL LENGTH OF STAY:          4 Suzy Whitten MD

## 2019-01-21 NOTE — ACP (ADVANCE CARE PLANNING)
Advanced care planning Pt does not have an AMD. Not , no children. NOK are her sister Abi Guevara and brother Solange Robles. She trusts them both to make decisions on her behalf. Goals of care are still being discussed. Primary Decision Maker ([address]): Susi Moreno \"Tara\" and brother Solange Robles Relationship to patient: siblings Phone number:[phone] [] Named in a scanned document  
[x] Legal Next of Kin 
[] Guardian

## 2019-01-21 NOTE — PROGRESS NOTES
Visited pt Ghulam Marc) and sister Titi Stein) with Palliative Dr. Zhang Gutierrez. [place] was more interactive than when I first met her on Friday. Offered presence and support as pt's health concerns and her care were discussed. Discussed uncertainties related to the future. Pt is experiencing loss of function - she lived independently prior to her hospitalization and seems to be beginning to process how life may be limited (time and quality) moving forward. She spoke of the importance she places on being able to go out to eat with friends. Pt appears to have good support in her sister Christian Amado. She indicates that she doesn't have a medical power of /AMD, but she shared that she trusts her sister Christian Amado and her brother (who lives in [place]) regarding her healthcare decisions. They are her NOK, as pt is not  and doesn't have any children. Sister Christian Amado is trying to support pt in medical decisions as well as logistics with her care, as she feels that pt will not be able to live alone or return home if/when she leaves the hospital. Christian Amado met with hospice today for information session. [facility] karel is important to her. Offered a spoken blessing and assurance of continued prayers and support as our visit ended today. Will continue to follow for emotional and spiritual support. Ilene Denny, Palliative

## 2019-01-21 NOTE — PROGRESS NOTES
Bedside shift change report given to [address] [place] [address] Avenue (oncoming nurse) by Dennis Han (offgoing nurse). Report included the following information SBAR, Kardex, Intake/Output, MAR and Recent Results.

## 2019-01-21 NOTE — PROGRESS NOTES
CM reviewed chart and noted noted transfer to [facility]. Family is meeting with [facility] today at 2:00pm.  CM will continue to follow.  
BAKARI Chávez, ACM

## 2019-01-21 NOTE — HOSPICE
[facility] [facility] 
[phone] Nursing Note Patient Name: Katelynn Saleem YOB: 1935 Age: 81 y.o. [employer] RN Note:  Hospice consult noted. Chart reviewed. Met with sister Parth Quiroga. All agreed to meet with this writer today in the family area on 2N. Hospice philosophy, plan of care, and tentative GIP vs. home admission to hospice was discussed. Disposition pending. Information packet and contact information given. Family is in agreement with hospice services. However, Patient does not currently meet General Inpatient Hospice Admission criteria at this time. Patient does not currently appear imminent (24-72 hrs), does not have unmanaged symptoms that could not be treated in the home setting. Appears that symptom management is sufficient with current hospital plan of care, at this time. [id number] [address] will review patient's chart daily to assess for Inpatient criteria. Siddhartha Ludwig was encouraged to contact  Hospice for additional questions or concerns.  Hospice will review patient's status as appropriate. Marian GALVIN updated. Thank you for the opportunity to care for this patient and family. Please contact [facility] at [phone] with any questions or concerns.

## 2019-01-21 NOTE — CONSULTS
Palliative Medicine Consult [place]: [phone] ([id number]) Patient Name: Clarinda Duverney YOB: 1935 Date of Initial Consult: 1/21/19 Reason for Consult: Care decisions Requesting Provider: Marsha Swan Primary Care Physician: Zulma Chun MD 
 
 SUMMARY:  
Clarinda Duverney \"Phillip" is a 81 y.o. with a past history of CHF, COPD, CKD, DM, HTN  who was admitted on 1/17/2019 from home via EMS w/ SOB. Lives alone and her sister was having a hard time reaching her. With AMS as well. Hypotensive in the ED, required IVF and placed on dopamine. Follows w/ [facility] cardiology, recently saw Dr Ron Tamez and diuretics adjusted. Given poor prognosis, cardiology and primary team recommending hospice to sister Greg Silva. Code status DNR. Pt has made some improvements over the weekend. 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 NOK are sister Greg Silva (lives in [place]) and brother Magan Brown (in [place]). No AMD. Had been living alone, independent w/ all ADLs, driving, social w/ friends going out weekly for breakfast. 
 
 
 PALLIATIVE DIAGNOSES:  
1. Shortness of breath 2. Fatigue, debility 3. Lethargy 4. Confusion and somnolence, improved over weekend 5. Goals of care PLAN:  
1. Meet pt and sister Greg Silva along w/  Ilene. According to staff and family, pt improved since over the weekend- much more alert and is actually talking today. She has some confusion about events this stay, but pretty much oriented. 2. Pt does not have an AMD, but trusts her NOK- siblings Greg Silva and Magan Brown anyhow. 3. Prior to admission pt doing all ADLs and IADLs, lived alone. When I ask her what brings her quality of life- states that she misses going out every Sunday w/ her friends to breakfast.  
4. Over weekend discussion was for hospice, even IP hospice- and sister met w/ them earlier this afternoon, does not meet IP criteria. 5. Catch pt up to date, as this is the first day she is oriented enough to talk. 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 Has cor pulmonale w/ R ventricular failure and cardiology wants to manage conservatively, and are supportive of hospice. Discuss her COPD, her CHF, worsening renal function. I am concerned that even if she stabilizes some and can leave- she is very high risk of decline and readmission, worsening function. 6. Talk about hospice even now- she would qualify for home hospice. Talk about it's philiosophy and if pt was at a point in her life where her goals are in line w/ it. She is not sure- has not thought about it before. 7. The other option would be for rehab, to try and get pt stronger. However concern that she doesn't the energy to work w/ therapy. Is an option though if she cont to make gains. Afterwards we could talk about palliative clinic or home program, but very honest that I do not think pt can safely live at home anymore or drive. Would eventually need to transition to hospice. She would need someone to be at home w/ her 24h a day or go to a long term nursing home. Does not have medicaid, so would need to be out of pocket. 8. Losing independence and not living at home alone anymore does not sound like good quality of life to her. 9. Right now, since has improved some, goals are to cont current measures to see how much she can stabilize. Would talk more about rehab and then hospice in future, although where it occurs is TBD.  If she declines, would talk more about hospice and changing to complete comfort measures here in the hospital.  
10. If pt declines, attending and I have talked w/ pt and family about shifting to comfort at that time rather than escalating care-  because it would be an indication that pt very unlikely to make a meaningful recovery. Would call sister and talk to her at time of decline. 11. Initial consult note routed to primary continuity provider 12. Communicated plan of care with: Palliative IDT; Dr Lucy Tao; Nash Mccarthy RN. Need to check w/ care management about medicaid- not on facesheet, but sister thought she had. GOALS OF CARE / TREATMENT PREFERENCES:  
 
GOALS OF CARE: 
Patient/Health Care Proxy Stated Goals: Other (comment)(recovery from acute issues) TREATMENT PREFERENCES:  
Code Status: DNR- will need DDNR Advance Care Planning: 
[x] The [facility] Interdisciplinary Team has updated the ACP Navigator with [address] and Patient Capacity Primary Decision Maker ([address]): Shaina Miles \"Tara\" and brother Denisse Relationship to patient: siblings Phone number:[phone] [] Named in a scanned document  
[x] Legal Next of Kin 
[] Guardian Secondary Decision Maker (First Alternate Health Care Agent):  
Relationship to patient: 
Phone number: 
[] Named in a scanned document  
[] Legal Next of Kin 
[] Guardian Medical Interventions: Limited additional interventions Other Instructions: Other: As far as possible, the palliative care team has discussed with patient / health care proxy about goals of care / treatment preferences for patient. HISTORY:  
 
History obtained from: Pt, family, staff, chart CHIEF COMPLAINT: SOB, fatigue HPI/SUBJECTIVE: The patient is:  
[x] Verbal and participatory [] Non-participatory due to:  
 
Pt on venti mask, but is alert and oriented. Feels weak and a bit SOB. No pain. Ate a bit of lunch. Clinical Pain Assessment (nonverbal scale for severity on nonverbal patients):  
Clinical Pain Assessment Severity: 0 Activity (Movement): Lying quietly, normal position Duration: for how long has pt been experiencing pain (e.g., 2 days, 1 month, years) Frequency: how often pain is an issue (e.g., several times per day, once every few days, constant) FUNCTIONAL ASSESSMENT:  
 
Palliative Performance Scale (PPS): PPS: 30 
 
 
 PSYCHOSOCIAL/SPIRITUAL SCREENING:  
 
Palliative IDT has assessed this patient for cultural preferences / practices and a referral made as appropriate to needs (Cultural Services, Patient Advocacy, Ethics, etc.) Any spiritual / Faith concerns: 
[] Yes /  [x] No 
 
Caregiver Burnout: 
[] Yes /  [x] No /  [] No Caregiver Present Anticipatory grief assessment:  
[x] Normal  / [] Maladaptive ESAS Anxiety: Anxiety: 2 ESAS Depression: Depression: 0 REVIEW OF SYSTEMS:  
 
Positive and pertinent negative findings in ROS are noted above in HPI. The following systems were [x] reviewed / [] unable to be reviewed as noted in HPI Other findings are noted below. Systems: constitutional, ears/nose/mouth/throat, respiratory, gastrointestinal, genitourinary, musculoskeletal, integumentary, neurologic, psychiatric, endocrine. Positive findings noted below. Modified ESAS Completed by: provider Fatigue: 6 Drowsiness: 0 Depression: 0 Pain: 0 Anxiety: 2 Nausea: 0 Anorexia: 3 Dyspnea: 3 Stool Occurrence(s): 1 PHYSICAL EXAM:  
 
From RN flowsheet: 
Wt Readings from Last 3 Encounters:  
01/21/19 166 lb 6.4 oz (75.5 kg) 01/10/19 150 lb (68 kg) 08/01/18 134 lb 12.8 oz (61.1 kg) Blood pressure 106/55, pulse 69, temperature 97.9 °F (36.6 °C), resp. rate 16, height 5' 1\" (1.549 m), weight 166 lb 6.4 oz (75.5 kg), SpO2 94 %. Pain Scale 1: Numeric (0 - 10) Pain Intensity 1: 0 Constitutional: awake, alert, oriented although does not remember issues that have occurred in the hospital 
Eyes: pupils equal, anicteric ENMT: no nasal discharge, dry mucous membranes Respiratory: breathing slightly labored w/ talking Musculoskeletal: no deformity, no tenderness to palpation Skin: warm, dry Neurologic: following commands, moving all extremities Psychiatric: full affect, no hallucinations HISTORY:  
 
Principal Problem: 
  Acute exacerbation of CHF (congestive heart failure) ([facility] [address] (2019) Active Problems: 
  SOB (shortness of breath) (2019) Hypoxia (2019) Inability to walk (2019) Acute-on-chronic kidney injury ([address] (2019) Obtundation (2019) Past Medical History:  
Diagnosis Date  MORGAN (acute kidney injury) ([facility] [address] 3/25/2018  Arthritis  Chronic obstructive pulmonary disease ([facility] [address] 2017  Cor pulmonale ([facility] [address] 2017  Diabetes ([facility] [address]  Elevated brain natriuretic peptide (BNP) level 2017  Essential hypertension 2017  Shortness of breath  Thyroid disease Past Surgical History:  
Procedure Laterality Date  HX CATARACT REMOVAL Bilateral   
  
Family History Problem Relation Age of Onset  Diabetes Brother History reviewed, no pertinent family history. Social History Tobacco Use  Smoking status: Former Smoker Last attempt to quit: 2000 Years since quittin.0  Smokeless tobacco: Never Used Substance Use Topics  Alcohol use: No  
 
Allergies Allergen Reactions  Ace Inhibitors Other (comments) Elevated creatinine  Other Medication Rash Auromycin Chlorocetin Current Facility-Administered Medications Medication Dose Route Frequency  sodium chloride (OCEAN) 0.65 % nasal squeeze bottle 2 Spray  2 Spray Both Nostrils Q4H  
 LORazepam (ATIVAN) injection 1 mg  1 mg IntraVENous Q4H PRN  
 levothyroxine (SYNTHROID) tablet 100 mcg  100 mcg Oral Once per day on Mon Tue Wed Thu Fri Sat  levothyroxine (SYNTHROID) tablet 150 mcg  150 mcg Oral every   dextrose 5 % - 0.45% NaCl infusion  50 mL/hr IntraVENous CONTINUOUS  
 aspirin delayed-release tablet 81 mg  81 mg Oral DAILY  glucose chewable tablet 16 g  4 Tab Oral PRN  
  dextrose (D50W) injection syrg 12.5-25 g  25-50 mL IntraVENous PRN  
 glucagon (GLUCAGEN) injection 1 mg  1 mg IntraMUSCular PRN  
 insulin lispro (HUMALOG) injection   SubCUTAneous AC&HS  sodium chloride (NS) flush 5-40 mL  5-40 mL IntraVENous Q8H  
 sodium chloride (NS) flush 5-40 mL  5-40 mL IntraVENous PRN  
 vits A and D-white pet-lanolin (A&D) ointment   Topical BID  miconazole (SECURA) 2 % extra thick cream   Topical BID  
 balsam peru-castor oil (VENELEX) ointment   Topical BID  
 
 
 
 LAB AND IMAGING FINDINGS:  
 
Lab Results Component Value Date/Time WBC 4.9 01/18/2019 02:18 AM  
 HGB 14.0 01/18/2019 02:18 AM  
 PLATELET 81 (L) 11/45/9040 02:18 AM  
 
Lab Results Component Value Date/Time Sodium 147 (H) 01/19/2019 05:41 AM  
 Potassium 4.7 01/19/2019 05:41 AM  
 Chloride 114 (H) 01/19/2019 05:41 AM  
 CO2 23 01/19/2019 05:41 AM  
 BUN 68 (H) 01/19/2019 05:41 AM  
 Creatinine 2.58 (H) 01/19/2019 05:41 AM  
 Calcium 6.9 (L) 01/19/2019 05:41 AM  
 Magnesium 2.3 01/19/2019 05:41 AM  
 Phosphorus 4.8 (H) 01/19/2019 05:41 AM  
  
Lab Results Component Value Date/Time AST (SGOT) 33 01/17/2019 05:22 PM  
 Alk. phosphatase 134 (H) 01/17/2019 05:22 PM  
 Protein, total 5.8 (L) 01/17/2019 05:22 PM  
 Albumin 3.1 (L) 01/19/2019 05:41 AM  
 Globulin 3.3 01/17/2019 05:22 PM  
 
No results found for: INR, PTMR, PTP, PT1, PT2, APTT No results found for: IRON, FE, TIBC, IBCT, PSAT, FERR No results found for: PH, PCO2, PO2 No components found for: [place] Lab Results Component Value Date/Time CK 63 01/18/2019 02:24 PM  
  
 
 
   
 
Total time: 70 min Counseling / coordination time, spent as noted above: 50 min  
> 50% counseling / coordination?: yes Prolonged service was provided for  []30 min   []75 min in face to face time in the presence of the patient, spent as noted above. Time Start:  
Time End:  
Note: this can only be billed with 37705 (initial) or 91867 (follow up). If multiple start / stop times, list each separately.

## 2019-01-22 NOTE — HOSPICE
[employer] [facility] Help to Those in Need 
[phone] Patient Name: Delphine Burkitt YOB: 1935 Age: 81 y.o. Charlie Lowe RN Note:  Chart reviewed. Plan of care discussed with patients nurse. In to meet with patient's sister Collette Bold. Evaluated patient's eligibility for inpatient hospice care. Patient has declined this afternoon, and this nurse could not arouse her. Sister states she talked with her earlier regarding ss#. Patient appears comfortable and does not meet criteria for GIP. Called Dr. Ned Oliveira who would like PC to see her again on Wednesday. Email sent to Dr. Terry Sinclair. Thank you for the opportunity to be of service to this patient.

## 2019-01-22 NOTE — PROGRESS NOTES
Bedside shift change report given to Benson Choi (oncoming nurse) by Bill Rao (offgoing nurse). Report included the following information SBAR, Kardex and MAR.

## 2019-01-22 NOTE — PROGRESS NOTES
Nephrology Progress Note Drake Sheth Date of Admission : 1/17/2019 CC:  Follow up for MORGAN Assessment and Plan MORGAN on CKD: 
- this appears to be volume depletion from poor po intake + diuresis 
- repeat labs today 
- will make further recs based on labs 
  
CKD IV: 
- baseline Cr 1.8 to 2 
- from DM and HTN 
  
Hypotension: 
- resolved 
  
Hypernatremia: 
- on D5 1/2 NS now 
- repeat Na today 
  
HFpEF: 
- stable 
  
Hx of HTN 
  
Hypothyroidism: 
- on synthroid 
  
DM2: 
- on insulin Interval History: 
Seen and examined. No plans for hospice at this time. Family wishes to continue care for now. No labs for several days. She seems more awake and alert today. On NC O2. No cp or sob reported. Current Medications: all current  Medications have been eviewed in [facility] Review of Systems: Review of systems not obtained due to patient factors. Objective: 
Vitals:   
Vitals:  
 01/21/19 2686 01/21/19 2114 01/22/19 0246 01/22/19 1894 BP:  91/49 100/60 113/60 Pulse:  (!) 59 91 73 Resp:  12 16 14 Temp:  97.5 °F (36.4 °C) 96.9 °F (36.1 °C) 97.3 °F (36.3 °C) SpO2: 94% 90% 90% 92% Weight:   75.1 kg (165 lb 9.6 oz) Height:      
 
Intake and Output: 
No intake/output data recorded. 01/20 1901 - 01/22 0700 In: -  
Out: 301 [[id number]] Physical Examination:Pt intubated     No 
General:  confused, lethargic Neck:  Supple, no mass Resp:  Lungs CTA B/L, no wheezing , normal respiratory effort CV:  RRR,  no murmur or rub, 1-2+ LE edema GI:  Soft, NT, + Bowel sounds, no hepatosplenomegaly Neurologic:  lethargic Psych:             Unable to assess Skin:  No Rash 
 
[]    High complexity decision making was performed 
[]    Patient is at high-risk of decompensation with multiple organ involvement Lab Data Personally Reviewed: I have reviewed all the pertinent labs, microbiology data and radiology studies during assessment. No results for input(s): NA, K, CL, CO2, GLU, BUN, CREA, CA, MG, PHOS, ALB, TBIL, SGOT, ALT, INR in the last 72 hours. No lab exists for component: INREXT, INREXT No results for input(s): WBC, HGB, HCT, PLT, HGBEXT, HCTEXT, PLTEXT, HGBEXT, HCTEXT, PLTEXT in the last 72 hours. No results found for: SDES Lab Results Component Value Date/Time Culture result: NO GROWTH 2 DAYS 01/18/2019 12:44 PM  
 
Recent Results (from the past 24 hour(s)) GLUCOSE, POC Collection Time: 01/21/19 11:12 AM  
Result Value Ref Range Glucose (POC) 159 (H) 65 - 100 mg/dL Performed by Davina Batista GLUCOSE, POC Collection Time: 01/21/19  3:58 PM  
Result Value Ref Range Glucose (POC) 140 (H) 65 - 100 mg/dL Performed by Davina Batista GLUCOSE, POC Collection Time: 01/21/19  9:15 PM  
Result Value Ref Range Glucose (POC) 152 (H) 65 - 100 mg/dL Performed by George Alas GLUCOSE, POC Collection Time: 01/22/19  6:25 AM  
Result Value Ref Range Glucose (POC) 142 (H) 65 - 100 mg/dL Performed by Zeke Peabody GLUCOSE, POC Collection Time: 01/22/19 10:48 AM  
Result Value Ref Range Glucose (POC) 159 (H) 65 - 100 mg/dL Performed by Danica Herring Total time spent with patient:  xxx   min. Care Plan discussed with: 
Patient Family RN Consulting Physician [address] [place] [address]     
 
I have reviewed the flowsheets. Chart and Pertinent Notes have been reviewed. No change in PMH ,family and social history from Consult note. Howard Jessica MD 
[address] [place] [address], [address] [facility] Phone - [phone] Fax - [phone] 
[email or web address]

## 2019-01-22 NOTE — PROGRESS NOTES
Physical Therapy 1/22/2019 Orders received and acknowledged. Chart reviewed and noted as of this morning pt has orders for comfort measures only. Will sign off and complete orders. Please re-consult if POC changes or a specific need arises.  
 
Thank Geeta Currie, PT, DPT

## 2019-01-22 NOTE — WOUND CARE
WOCN Note:  
  
Follow up assessment. Daniel Armendariz RN and Student nurses at the bedside for incontinence care. Chart reviewed. Admitted DX:  Acute exacerbation of CHF (congestive heart failure) ([facility] [address] Hypoxia Inability to walk Acute-on-chronic kidney injury ([facility] [address] SOB (shortness of breath) Past Medical History:  chronic kidney disease, arthritis, COPD, cor pulmonale, CHF, hypertension, type 2 diabetes mellitus, hypothyroidism, gait abnormality 
  
Assessment:  
Patient is alert, communicative and requires assist with repositioning. Bed: Runge Patient wearing briefs for incontinence. Patient reports no pain. Heels offloaded on prevalon boots. 
  
POA Left heel, Pressure Injury DTI:  0.8 X 0.8 x 0 cm non blanching maroon. POA Right heel, Pressure Injury DTI: 0.5  x 0.5 x 0 cm diffuse non blanching maroon. POA Left buttock, Pressure Injury DTI:  2 x 1 x 0 cm; non blanching purple POA Right buttock, Pressure Injury DTI:  3 x 0.5 0 cm; non blanching purple Bilateral groin, red moist rash consistent with Candidiais Bilateral hands, multiple areas of linear dry abrasions from unknown source. 
  
Recommendations:   
Bilateral hands:  Twice daily apply Vitamin A&D ointment. Bilateral heel, sacrum and buttocks:  Twice daily apply Venelex. Bilateral groin:  Twice daily apply Secura antifungal cream. 
  
Skin Care & Pressure Prevention: 
Minimize layers of linen/pads under patient to optimize support surface. Turn/reposition approximately every 2 hours and offload heels. Manage incontinence / promote continence Specialty bed: [place]. Use only flat sheet and one incontinence pad. Please call Ronn Virgen if patient is transferred and order an Envision on [facility] bed. 
  
Discussed above plan with patient and RN. 
  
Transition of Care: Plan to follow as needed while admitted to hospital. 
  
AILYN McmahanN RN [facility], [facility]. Wound Care Office [phone] Pager [phone]

## 2019-01-22 NOTE — PROGRESS NOTES
Visited pt's room (pt now in private room) to see if any family members were present at bedside. Pt's sister Abi Guevara had arrived. Offered support to Abi Guevara as she reflected on pt's condition. Pt was resting at the time of this visit. Abi Guevara shared that she had just spoken to a doctor and received an update. No new worries or concerns expressed. Abi Guevara has spoken with pt's brother and he may visit tomorrow. Normalized Tara's emotions as she seeks to support patient. Shared with Abi Guevara that I had been able to visit pt earlier and that I would continue to visit (as able). Assurance of prayers offered. Ilene Sutton, Palliative

## 2019-01-22 NOTE — PROGRESS NOTES
Labs reviewed Cr up, BP borderline low Na at 147 Patient appears to be comfort measures now Will sign off. Please call use back with any questions

## 2019-01-22 NOTE — PROGRESS NOTES
Cards Pt in and out of wakefulness and awareness Cant get any ROS Sister at bedside Patient Vitals for the past 12 hrs: 
 Temp Pulse Resp BP SpO2  
01/22/19 1629 96.7 °F (35.9 °C) (!) 52 14 (!) 84/42 (!) 87 % 01/22/19 1349 97.5 °F (36.4 °C) (!) 49 14 (!) 83/45 (!) 88 % 01/22/19 1243 (!) 94.6 °F (34.8 °C) (!) 55 14 91/57 94 % 01/22/19 1141 97.5 °F (36.4 °C) (!) 48 14 (!) 80/53 93 % 01/22/19 0805 97.3 °F (36.3 °C) 73 14 113/60 92 % Elderly, ill appearing,mild tachypnea, mild resp distress, diminished breath sounds RRR  smrg 
1-2+ pedal edema Creat now at 3.15 likely hospice and will get further uremia Since I have known them some time, have come back to assist , spoke to sister on goals of care, she thinks pt unable to come home and wants comfort Has cor pulmonale -not able to fix RV fx and leads to diastolic CHF acute on chronic, now nearing end stage MORGAN-CKD with hypovolemia on admit, on diuretics for severe leg edema and SOB as OP Heading to hospice liely

## 2019-01-22 NOTE — PROGRESS NOTES
Hospitalist Progress Note Shania Eagle MD 
Answering service: [phone] OR [phone] from in house phone Date of Service:  2019 NAME:  Lex Mora :  1935 MRN:  [id number] Admission Summary: An 27-year-old white female with past medical history of chronic kidney disease, arthritis, COPD, cor pulmonale, CHF, hypertension, type 2 diabetes mellitus, hypothyroidism, gait abnormality presented to the emergency department from home via EMS with chief complaint of shortness of breath. No generalized weakness. The patient is a limited historian, majority of history was obtained from review of ED and electronic medical record. Per the collective reports, the patient's sister tried to call the patient, became worried when she had not received an answer. She reportedly went to check on the patient and found that the patient was unable to answer the phone. Patient reports that she fell. Interval history / Subjective:  
Patient is seen and examined at bedside. She is lethargic and debilitated, discussed case with Lashawn Newman, decision is comfort measure only after this discussion. Pt not alble to participate in discussions as to confusional . ,   
 
Assessment & Plan: 1. Chronic congestive heart failure NYHA 3-4 :  
Acute on chronic diastolic heart failure w/cor pulmonale (POA) in the setting of Lasix IV (ED), Dopamine gtt and Cardiology consulted 
- does not look volume overloaded her MM dry - per sister not eating / drinking well last few days 
- her BP low. hold BP meds and Diuretics 
- gentle hydration for now 
- higher BNP is sometime non specific in renal failure  
-cardiology tried dopamine drip  but  notes from Dr. Jenni Olmos states : \" On dopamine would d/c and pursue palliative care\" 2. Chronic hypoxic respiratory failure :  
- unclear etiology uses baseline home o2 . Possibly due to CHF -  Continue oxygen therapy and pulse telemetry monitoring. 
- CXR - mild interstitial edema and small pleural effusions. - currently placed on ventimask 3. Generalized weakness/debility.   
- Place on fall precautions. Reported fall at home get CK 
-  patient lives alone and not able to care for herself. 4.  Type 2 diabetes mellitus. - Place on Humalog insulin correction coverage, schedule Accu-Chek and check hemoglobin A1c level. lantus on hold 7. Bilateral lower extremity edema. Keep legs elevated at rest.  Continue strict I's and O's. 
 
8.  Thrombocytopenia.  will monitor. No signs of bleeding. Hold any A/C 
 
9. Dizziness. Place on fall precautions. May be due to hypotension 
-  CT of the head without contrast to rule out any acute process. 10.  Bradycardia. Place hold parameters for beta blockers. Was on coreg 11. Acute superimposed on chronic kidney disease. Hypernatremia 
- nephrology on board - Renal us: Small kidneys with normal echogenicity without hydronephrosis. - IVF per nephro 
- will monitor renal function 12. Hypothermia  resolved 
- could be due to sepsis 
- TSH low - levothyroxine adjusted - provide karen maher Palliative care consulted 1/19: Discussed in length with sister Hollisne Soltasha regarding poor prognosis and she is in agreement. She does not wish to do anything aggressive measures and agreed to talk to hospice. She would prefer if the patient can be inpatient hospice. Hospice consult placed. 1/22: discussed again with sister Delmi Rivera as to care plans (plans of care) and decision is to go with comfort measures only 1/22/2019 forward. Downgraded to remote tele 1/20: hoping to have palliative care  and hospice discussions tomorrow with sister Christian Amado 
 
1/21: hospice meeting at 2pm today Addendum: since patient improved in her mentation today, sister has decide to opt for rehab than hospice.   
 
Code status: DNR 
DVT prophylaxis: SCD 
 
 Care Plan discussed with: Patient/Family and Nurse Disposition: TBD. Skin :  
 
Patient is alert, communicative and requires assist with repositioning.   
Bed: Branch Patient wearing briefs for incontinence. Patient reports no pain. Heels offloaded on prevalon boots. 
  
POA Left heel, Pressure Injury DTI:  0.8 X 0.8 x 0 cm non blanching maroon. POA Right heel, Pressure Injury DTI: 0.5  x 0.5 x 0 cm diffuse non blanching maroon. POA Left buttock, Pressure Injury DTI:  2 x 1 x 0 cm; non blanching purple POA Right buttock, Pressure Injury DTI:  3 x 0.5 0 cm; non blanching purple Bilateral groin, red moist rash consistent with Candidiais Bilateral hands, multiple areas of linear dry abrasions from unknown source. 
  
Recommendations:   
Bilateral hands:  Twice daily apply Vitamin A&D ointment. Bilateral heel, sacrum and buttocks:  Twice daily apply Venelex. Bilateral groin:  Twice daily apply Secura antifungal cream. 
  
Skin Care & Pressure Prevention: 
Minimize layers of linen/pads under patient to optimize support surface.   
Turn/reposition approximately every 2 hours and offload heels. Manage incontinence / promote continence Specialty bed: [place]. Use only flat sheet and one incontinence pad. Please call [facility]  DZ patient is transferred and order an Envision on [facility] bed. 
  
Discussed above plan with patient and RN. Hospital Problems  Date Reviewed: 1/19/2019 Codes Class Noted POA Obtundation ICD-10-CM: R40.1 ICD-9-CM: 780.09  1/19/2019 Unknown  
   
 SOB (shortness of breath) ICD-10-CM: R06.02 
ICD-9-CM: 786.05  1/17/2019 Unknown Hypoxia ICD-10-CM: R09.02 
ICD-9-CM: 799.02  1/17/2019 Unknown Inability to walk ICD-10-CM: R26.2 ICD-9-CM: 719.7  1/17/2019 Unknown * (Principal) Acute exacerbation of CHF (congestive heart failure) (HCC) ICD-10-CM: I50.9 ICD-9-CM: 428.0  1/17/2019 Unknown Acute-on-chronic kidney injury ([facility] [address] ICD-10-CM: N17.9, N18.9 ICD-9-CM: 584.9, 585.9  1/17/2019 Unknown Review of Systems:  
Review of systems not obtained due to patient factors. Vital Signs:  
 Last 24hrs VS reviewed since prior progress note. Most recent are: 
Visit Vitals /60 (BP 1 Location: Left arm, BP Patient Position: At rest) Pulse 73 Temp 97.3 °F (36.3 °C) Resp 14 Ht 5' 1\" (1.549 m) Wt 75.1 kg (165 lb 9.6 oz) SpO2 92% BMI 31.29 kg/m² No intake or output data in the 24 hours ending 01/22/19 0949 Physical Examination:  
 
 
     
Constitutional:  chronically ill looking ENT:  Oral mucous moist, oropharynx benign. Neck supple Resp: Decreased breath sounds at bases CV:  Regular rhythm, normal rate GI:  Soft, non distended, non tender. normoactive bowel sounds Musculoskeletal:  warm, 2+ pulses throughout Neurologic:  Moves all extremities. Grossly intact. lethargic Data Review:  
 I personally reviewed  Image and tbd Labs:  
 
No results for input(s): WBC, HGB, HCT, PLT, HGBEXT, HCTEXT, PLTEXT, HGBEXT, HCTEXT, PLTEXT in the last 72 hours. No results for input(s): NA, K, CL, CO2, BUN, CREA, GLU, CA, MG, PHOS, URICA in the last 72 hours. No results for input(s): SGOT, GPT, ALT, AP, TBIL, TBILI, TP, ALB, GLOB, GGT, AML, LPSE in the last 72 hours. No lab exists for component: AMYP, HLPSE No results for input(s): INR, PTP, APTT in the last 72 hours. No lab exists for component: INREXT, INREXT No results for input(s): FE, TIBC, PSAT, FERR in the last 72 hours. No results found for: FOL, RBCF No results for input(s): PH, PCO2, PO2 in the last 72 hours. No results for input(s): CPK, CKNDX, TROIQ in the last 72 hours. No lab exists for component: CPKMB No results found for: CHOL, CHOLX, CHLST, CHOLV, HDL, LDL, LDLC, DLDLP, TGLX, TRIGL, TRIGP, CHHD, CHHDX Lab Results Component Value Date/Time Glucose (POC) 142 (H) 01/22/2019 06:25 AM  
 Glucose (POC) 152 (H) 01/21/2019 09:15 PM  
 Glucose (POC) 140 (H) 01/21/2019 03:58 PM  
 Glucose (POC) 159 (H) 01/21/2019 11:12 AM  
 Glucose (POC) 101 (H) 01/21/2019 06:27 AM  
 
Lab Results Component Value Date/Time Color YELLOW/STRAW 01/18/2019 12:44 PM  
 Appearance CLOUDY (A) 01/18/2019 12:44 PM  
 Specific gravity 1.015 01/18/2019 12:44 PM  
 pH (UA) 5.0 01/18/2019 12:44 PM  
 Protein 100 (A) 01/18/2019 12:44 PM  
 Glucose NEGATIVE  01/18/2019 12:44 PM  
 Ketone NEGATIVE  01/18/2019 12:44 PM  
 Bilirubin NEGATIVE  01/18/2019 12:44 PM  
 Urobilinogen 0.2 01/18/2019 12:44 PM  
 Nitrites NEGATIVE  01/18/2019 12:44 PM  
 Leukocyte Esterase TRACE (A) 01/18/2019 12:44 PM  
 Epithelial cells FEW 01/18/2019 12:44 PM  
 Bacteria 1+ (A) 01/18/2019 12:44 PM  
 WBC 0-4 01/18/2019 12:44 PM  
 RBC 10-20 01/18/2019 12:44 PM  
 
 
 
Medications Reviewed:  
 
Current Facility-Administered Medications Medication Dose Route Frequency  sodium chloride (OCEAN) 0.65 % nasal squeeze bottle 2 Spray  2 Spray Both Nostrils Q4H  
 LORazepam (ATIVAN) injection 1 mg  1 mg IntraVENous Q4H PRN  
 levothyroxine (SYNTHROID) tablet 100 mcg  100 mcg Oral Once per day on Mon Tue Wed Thu Fri Sat  levothyroxine (SYNTHROID) tablet 150 mcg  150 mcg Oral every Sunday  dextrose 5 % - 0.45% NaCl infusion  50 mL/hr IntraVENous CONTINUOUS  
 aspirin delayed-release tablet 81 mg  81 mg Oral DAILY  glucose chewable tablet 16 g  4 Tab Oral PRN  
 dextrose (D50W) injection syrg 12.5-25 g  25-50 mL IntraVENous PRN  
 glucagon (GLUCAGEN) injection 1 mg  1 mg IntraMUSCular PRN  
 insulin lispro (HUMALOG) injection   SubCUTAneous AC&HS  sodium chloride (NS) flush 5-40 mL  5-40 mL IntraVENous Q8H  
 sodium chloride (NS) flush 5-40 mL  5-40 mL IntraVENous PRN  
 vits A and D-white pet-lanolin (A&D) ointment   Topical BID  miconazole (SECURA) 2 % extra thick cream   Topical BID  
  balsam peru-castor oil (VENELEX) ointment   Topical BID  
 
______________________________________________________________________ EXPECTED LENGTH OF STAY: 4d 2h 
ACTUAL LENGTH OF STAY:          5 Ponce Horton MD

## 2019-01-22 NOTE — PROGRESS NOTES
Follow up visit with Jailene Saleh after pt's chart was reviewed and case discussed with Dr. Jose Roberto Guido. Spoke with nursing student who was present at bedside. Jailene Saleh was resting, but responded when we called her name. Explored with Jailene Saleh how she is feeling today, and she indicated that she was feeling tired. Explored her needs - no additional concerns expressed at this time. She accepted my offer of prayer, which was offered at bedside. Patti Parker of ongoing  support. Pt's sister Thang Archer was not present at the time of my visit. Will continue to follow for pt and family support as able/needed. Ilene Glass, Palliative

## 2019-01-22 NOTE — PROGRESS NOTES
Occupational Therapy 
  
Orders received and acknowledged. Chart reviewed and noted as of this morning pt has orders for comfort measures only. Will sign off and complete orders. Please re-consult if POC changes or a specific need arises.   Dmitry Cadet OTR/L

## 2019-01-23 NOTE — PROGRESS NOTES
Bedside shift change report given to Hortencia Wallace (oncoming nurse) by Gema Baer (offgoing nurse). Report included the following information SBAR, Kardex and MAR.

## 2019-01-23 NOTE — PROGRESS NOTES
01/23/19 1459 Vital Signs Temp 96.4 °F (35.8 °C) Temp Source Axillary Pulse (Heart Rate) (!) 48 Heart Rate Source Monitor Resp Rate 18  
O2 Sat (%) (!) 88 % Level of Consciousness Alert BP 92/46 MAP (Calculated) (!) 61 BP 1 Method Automatic  
BP 1 Location Left arm BP Patient Position At rest  
MEWS Score 3 Patient is comfort care only. Family at bedside today, will eventually transition to hospice. MD aware.

## 2019-01-23 NOTE — PROGRESS NOTES
Palliative Medicine Consult [place]: [phone] ([phone]) Patient Name: Joey Wheeler YOB: 1935 Date of Initial Consult: 1/21/19 Reason for Consult: Care decisions Requesting Provider: Kathleen Rivas Primary Care Physician: Alfonso Schultz MD 
 
 SUMMARY:  
Joey Wheeler \"Phillip" is a 81 y.o. with a past history of CHF, COPD, CKD, DM, HTN  who was admitted on 1/17/2019 from home via EMS w/ SOB. Lives alone and her sister was having a hard time reaching her. With AMS as well. Hypotensive in the ED, required IVF and placed on dopamine. Follows w/ [facility] cardiology, recently saw Dr Oj Webber and diuretics adjusted. Given poor prognosis, cardiology and primary team recommending hospice to sister Meme Hunt. Code status DNR. Pt has made some improvements over the weekend. 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 NOK are sister Meme Hunt (lives in [facility]) and brother Denisse (in [place]). No AMD. Had been living alone, independent w/ all ADLs, driving, social w/ friends going out weekly for breakfast. 
 
 
 PALLIATIVE DIAGNOSES:  
1. Shortness of breath 2. Fatigue, debility 3. Lethargy 4. Confusion and somnolence, improved over weekend 5. Goals of care PLAN:  
1. When I met w/ pt and sister Meme Hunt on 1/21, pt had become significantly more alert and oriented than over weekend and plan was to see if she could benefit from rehab. However yesterday, pt w/ incr confusion, SOB, and worsening renal function (uop decr and Cr 3.15). Family talked more w/ primary team and decision made to move to comfort measures only. 2. Today visit w/ pt along w/ Gordy Rogers RN. Still able to answer questions, but confused, no longer taking po, incr SOB. Has not yet required prn comfort meds, but bedside RN to continue close assessment. 3. Discussed pt care w/ hospice medical director Dr Mitchell Timmons.  As pt w/out significant sx requiring medication adjustments, does not meet IP hospice criteria at this time- however if continues to decline in the way she has over the past couple days, may meet IP criteria under a comfort bed. 4. Discussed care w/ hospice team  
 
 
 GOALS OF CARE / TREATMENT PREFERENCES:  
 
GOALS OF CARE: 
Patient/Health Care Proxy Stated Goals: Comfort TREATMENT PREFERENCES:  
Code Status: DNR- will need DDNR Advance Care Planning: 
[x] The [facility] Interdisciplinary Team has updated the ACP Navigator with [address] and Patient Capacity Primary Decision Maker ([address]): Jeffry Omar \"Tara\" and brother Grayson Alas Relationship to patient: siblings Phone number:[phone] [] Named in a scanned document  
[x] Legal Next of Kin 
[] Guardian Secondary Decision Maker (First Alternate Health Care Agent):  
Relationship to patient: 
Phone number: 
[] Named in a scanned document  
[] Legal Next of Kin 
[] Guardian Medical Interventions: Comfort measures Other Instructions: Other: As far as possible, the palliative care team has discussed with patient / health care proxy about goals of care / treatment preferences for patient. HISTORY:  
 
 
CHIEF COMPLAINT: SOB, fatigue , confusion HPI/SUBJECTIVE: The patient is:  
[x] Verbal and participatory [] Non-participatory due to:  
 
Pt on NC O2, incr confusion although can still converse w/ me. Not taking in po. Clinical Pain Assessment (nonverbal scale for severity on nonverbal patients):  
Clinical Pain Assessment Severity: 0 Activity (Movement): Lying quietly, normal position Duration: for how long has pt been experiencing pain (e.g., 2 days, 1 month, years) Frequency: how often pain is an issue (e.g., several times per day, once every few days, constant) FUNCTIONAL ASSESSMENT:  
 
Palliative Performance Scale (PPS): PPS: 20 
 
 
 PSYCHOSOCIAL/SPIRITUAL SCREENING:  
 
 Palliative IDT has assessed this patient for cultural preferences / practices and a referral made as appropriate to needs (Cultural Services, Patient Advocacy, Ethics, etc.) Any spiritual / Congregational concerns: 
[] Yes /  [x] No 
 
Caregiver Burnout: 
[] Yes /  [x] No /  [] No Caregiver Present Anticipatory grief assessment:  
[x] Normal  / [] Maladaptive ESAS Anxiety: Anxiety: 2 ESAS Depression: Depression: 0 REVIEW OF SYSTEMS:  
 
Positive and pertinent negative findings in ROS are noted above in HPI. The following systems were [x] reviewed / [] unable to be reviewed as noted in HPI Other findings are noted below. Systems: constitutional, ears/nose/mouth/throat, respiratory, gastrointestinal, genitourinary, musculoskeletal, integumentary, neurologic, psychiatric, endocrine. Positive findings noted below. Modified ESAS Completed by: provider Fatigue: 8 Drowsiness: 5 Depression: 0 Pain: 0 Anxiety: 2 Nausea: 0 Anorexia: 3 Dyspnea: 3 Stool Occurrence(s): 1 PHYSICAL EXAM:  
 
From RN flowsheet: 
Wt Readings from Last 3 Encounters:  
01/23/19 157 lb (71.2 kg) 01/10/19 150 lb (68 kg) 08/01/18 134 lb 12.8 oz (61.1 kg) Blood pressure 95/45, pulse 64, temperature 96.3 °F (35.7 °C), resp. rate 18, height 5' 1\" (1.549 m), weight 157 lb (71.2 kg), SpO2 92 %. Pain Scale 1: Numeric (0 - 10) Pain Intensity 1: 0 Constitutional: awake, alert and oriented to person and family but cannot complete sentences and complete thoughts like she did a couple days ago Eyes: pupils equal, anicteric ENMT: no nasal discharge, dry mucous membranes, bluish lips Respiratory: breathing slightly labored, coarse breath sounds Musculoskeletal: no deformity, no tenderness to palpation Skin: warm, dry Neurologic:  moving all extremities, some confusion Psychiatric: full affect, no hallucinations HISTORY:  
 
Principal Problem: Acute exacerbation of CHF (congestive heart failure) ([facility] (2019) Active Problems: 
  SOB (shortness of breath) (2019) Hypoxia (2019) Inability to walk (2019) Acute-on-chronic kidney injury ([facility] [address] (2019) Obtundation (2019) Past Medical History:  
Diagnosis Date  MORGAN (acute kidney injury) ([facility] 3/25/2018  Arthritis  Chronic obstructive pulmonary disease ([facility] 2017  Cor pulmonale ([facility] 2017  Diabetes ([facility]  Elevated brain natriuretic peptide (BNP) level 2017  Essential hypertension 2017  Shortness of breath  Thyroid disease Past Surgical History:  
Procedure Laterality Date  HX CATARACT REMOVAL Bilateral   
  
Family History Problem Relation Age of Onset  Diabetes Brother History reviewed, no pertinent family history. Social History Tobacco Use  Smoking status: Former Smoker Last attempt to quit: 2000 Years since quittin.0  Smokeless tobacco: Never Used Substance Use Topics  Alcohol use: No  
 
Allergies Allergen Reactions  Ace Inhibitors Other (comments) Elevated creatinine  Other Medication Rash Auromycin Chlorocetin Current Facility-Administered Medications Medication Dose Route Frequency  morphine injection 2 mg  2 mg IntraVENous Q1H PRN  
 albuterol (PROVENTIL VENTOLIN) nebulizer solution 2.5 mg  2.5 mg Nebulization Q2H PRN  
 morphine 10 mg/ml injection 10 mg  10 mg Inhalation Q1H PRN  
 acetaminophen (TYLENOL) tablet 650 mg  650 mg Oral Q4H PRN Or  
 acetaminophen (TYLENOL) solution 650 mg  650 mg Oral Q4H PRN  Or  
 acetaminophen (TYLENOL) suppository 650 mg  650 mg Rectal Q4H PRN  
 morphine (ROXANOL) 100 mg/5 mL (20 mg/mL) concentrated solution 10 mg  10 mg Oral Q1H PRN  
 sodium chloride (OCEAN) 0.65 % nasal squeeze bottle 2 Spray  2 Spray Both Nostrils Q4H  
  LORazepam (ATIVAN) injection 1 mg  1 mg IntraVENous Q4H PRN  
 sodium chloride (NS) flush 5-40 mL  5-40 mL IntraVENous Q8H  
 sodium chloride (NS) flush 5-40 mL  5-40 mL IntraVENous PRN  
 vits A and D-white pet-lanolin (A&D) ointment   Topical BID  miconazole (SECURA) 2 % extra thick cream   Topical BID  
 balsam peru-castor oil (VENELEX) ointment   Topical BID  
 
 
 
 LAB AND IMAGING FINDINGS:  
 
Lab Results Component Value Date/Time WBC 4.9 01/18/2019 02:18 AM  
 HGB 14.0 01/18/2019 02:18 AM  
 PLATELET 81 (L) 42/09/5836 02:18 AM  
 
Lab Results Component Value Date/Time Sodium 147 (H) 01/22/2019 11:59 AM  
 Potassium 4.6 01/22/2019 11:59 AM  
 Chloride 117 (H) 01/22/2019 11:59 AM  
 CO2 21 01/22/2019 11:59 AM  
 BUN 87 (H) 01/22/2019 11:59 AM  
 Creatinine 3.15 (H) 01/22/2019 11:59 AM  
 Calcium 7.8 (L) 01/22/2019 11:59 AM  
 Magnesium 2.3 01/19/2019 05:41 AM  
 Phosphorus 5.2 (H) 01/22/2019 11:59 AM  
  
Lab Results Component Value Date/Time AST (SGOT) 33 01/17/2019 05:22 PM  
 Alk. phosphatase 134 (H) 01/17/2019 05:22 PM  
 Protein, total 5.8 (L) 01/17/2019 05:22 PM  
 Albumin 2.2 (L) 01/22/2019 11:59 AM  
 Globulin 3.3 01/17/2019 05:22 PM  
 
No results found for: INR, PTMR, PTP, PT1, PT2, APTT No results found for: IRON, FE, TIBC, IBCT, PSAT, FERR No results found for: PH, PCO2, PO2 No components found for: [place] Lab Results Component Value Date/Time CK 63 01/18/2019 02:24 PM  
  
 
 
   
 
Total time: 35 min Counseling / coordination time, spent as noted above: 30 min  
> 50% counseling / coordination?: yes Prolonged service was provided for  []30 min   []75 min in face to face time in the presence of the patient, spent as noted above. Time Start:  
Time End:  
Note: this can only be billed with 84942 (initial) or 09041 (follow up). If multiple start / stop times, list each separately.

## 2019-01-23 NOTE — PROGRESS NOTES
Hospitalist Progress Note Ponce Horton MD 
Answering service: [phone] OR [phone] from in house phone Date of Service:  2019 NAME:  Marisela Jimenes :  1935 MRN:  [id number] Admission Summary: An 16-year-old white female with past medical history of chronic kidney disease, arthritis, COPD, cor pulmonale, CHF, hypertension, type 2 diabetes mellitus, hypothyroidism, gait abnormality presented to the emergency department from home via EMS with chief complaint of shortness of breath. No generalized weakness. The patient is a limited historian, majority of history was obtained from review of ED and electronic medical record. Per the collective reports, the patient's sister tried to call the patient, became worried when she had not received an answer. She reportedly went to check on the patient and found that the patient was unable to answer the phone. Patient reports that she fell. Interval history / Subjective:  
 
2019 : 
Declines to non verbal else very lethargic /asleep on rounds. Low bp of  78 syst and pulse of 51 noted over last 12 hours, pt is now comfort care only, , pt does appear comfortable Assessment & Plan: 1. Chronic congestive heart failure NYHA 3-4 :  
Acute on chronic diastolic heart failure w/cor pulmonale (POA) in the setting of Lasix IV (ED), Dopamine gtt and Cardiology consulted 
- does not look volume overloaded her MM dry - per sister not eating / drinking well last few days 
- her BP low. hold BP meds and Diuretics 
- gentle hydration for now 
- higher BNP is sometime non specific in renal failure  
-cardiology tried dopamine drip  but  notes from Dr. Ammon Rios states : \" On dopamine would d/c and pursue palliative care\" = comfort care only as of pm . 2.  Chronic hypoxic respiratory failure :  
- unclear etiology uses baseline home o2 . Possibly due to CHF -  Continue oxygen therapy and pulse telemetry monitoring. 
- CXR - mild interstitial edema and small pleural effusions. - currently placed on ventimask - changed to nc 3. Generalized weakness/debility.   
- Place on fall precautions. Reported fall at home get CK 
-  patient lives alone and not able to care for herself. - comfort care, bed ridden 1/22 - forward to 1/23/2019 =  
 
4. Type 2 diabetes mellitus. - Place on Humalog insulin correction coverage, schedule Accu-Chek and check hemoglobin A1c level. lantus on hold= now only comfort meds, insulin is not a comfort med 7. Bilateral lower extremity edema. Keep legs elevated at rest.  Continue strict I's and O's. 
 
8.  Thrombocytopenia.  will monitor. No signs of bleeding. Hold any A/C- no further lab 1/22 forward as comfort care only 9. Dizziness. Place on fall precautions. May be due to hypotension 
-  CT of the head without contrast to rule out any acute process. 10.  Bradycardia. Place hold parameters for beta blockers. Was on coreg 11. Acute superimposed on chronic kidney disease. Hypernatremia 
- nephrology on board - Renal us: Small kidneys with normal echogenicity without hydronephrosis. - IVF per nephro 
- will monitor renal function 12. Hypothermia  resolved 
- could be due to sepsis 
- TSH low - levothyroxine adjusted - provide karen maher Palliative care consulted 1/19: Discussed in length with sister Siddhartha Ludwig regarding poor prognosis and she is in agreement. She does not wish to do anything aggressive measures and agreed to talk to hospice. She would prefer if the patient can be inpatient hospice. Hospice consult placed. = seen 1/22 and daily as needed to assist w dispo as can  
 
1/22: discussed again with sister Parth Quiroga as to care plans (plans of care) and decision is to go with comfort measures only 1/22/2019 forward. Downgraded to remote tele 1/20: hoping to have palliative care  and hospice discussions tomorrow with sister Beatris Marrufo 
 
1/21: hospice meeting at 2pm today 1/22 comfort care only . Code status: DNR 
DVT prophylaxis: SCD Care Plan discussed with: Patient/Family and Nurse Disposition: TBD. Skin :  
 
Patient is alert, communicative and requires assist with repositioning.   
Bed: [place] Patient wearing briefs for incontinence. Patient reports no pain. Heels offloaded on prevalon boots. 
  
POA Left heel, Pressure Injury DTI:  0.8 X 0.8 x 0 cm non blanching maroon. POA Right heel, Pressure Injury DTI: 0.5  x 0.5 x 0 cm diffuse non blanching maroon. POA Left buttock, Pressure Injury DTI:  2 x 1 x 0 cm; non blanching purple POA Right buttock, Pressure Injury DTI:  3 x 0.5 0 cm; non blanching purple Bilateral groin, red moist rash consistent with Candidiais Bilateral hands, multiple areas of linear dry abrasions from unknown source. 
  
Recommendations:   
Bilateral hands:  Twice daily apply Vitamin A&D ointment. Bilateral heel, sacrum and buttocks:  Twice daily apply Venelex. Bilateral groin:  Twice daily apply Secura antifungal cream. 
  
Skin Care & Pressure Prevention: 
Minimize layers of linen/pads under patient to optimize support surface.   
Turn/reposition approximately every 2 hours and offload heels. Manage incontinence / promote continence Specialty bed: [place]. Use only flat sheet and one incontinence pad. Please call [facility]  VV patient is transferred and order an Envision on Versac[employer] bed. 
  
Discussed above plan with patient and RN. Hospital Problems  Date Reviewed: 1/19/2019 Codes Class Noted POA Obtundation ICD-10-CM: R40.1 ICD-9-CM: 780.09  1/19/2019 Unknown  
   
 SOB (shortness of breath) ICD-10-CM: R06.02 
ICD-9-CM: 786.05  1/17/2019 Unknown Hypoxia ICD-10-CM: R09.02 
ICD-9-CM: 799.02  1/17/2019 Unknown Inability to walk ICD-10-CM: R26.2 ICD-9-CM: 719.7  1/17/2019 Unknown * (Principal) Acute exacerbation of CHF (congestive heart failure) (HCC) ICD-10-CM: I50.9 ICD-9-CM: 428.0  1/17/2019 Unknown Acute-on-chronic kidney injury ([facility] [address] ICD-10-CM: N17.9, N18.9 ICD-9-CM: 584.9, 585.9  1/17/2019 Unknown Review of Systems:  
Review of systems not obtained due to patient factors. Vital Signs:  
 Last 24hrs VS reviewed since prior progress note. Most recent are: 
Visit Vitals /56 (BP 1 Location: Left arm, BP Patient Position: At rest) Pulse 65 Temp 97.3 °F (36.3 °C) Resp 15 Ht 5' 1\" (1.549 m) Wt 71.2 kg (157 lb) SpO2 92% BMI 29.66 kg/m² No intake or output data in the 24 hours ending 01/23/19 9910 Physical Examination:  
 
 
     
Constitutional:  chronically ill looking ENT:  Oral mucous dry,  Neck supple Resp: Decreased breath sounds at bases CV:  Regular rhythm, normal rate GI:  Soft, non distended, non tender. normoactive bowel sounds Musculoskeletal:  warm, 1 pulses throughout Neurologic:    Grossly  lethargic Data Review:  
 no further lab , vs noted. Labs:  
 
No results for input(s): WBC, HGB, HCT, PLT, HGBEXT, HCTEXT, PLTEXT, HGBEXT, HCTEXT, PLTEXT in the last 72 hours. Recent Labs  
  01/22/19 
1159 * K 4.6 * CO2 21 BUN 87* CREA 3.15* * CA 7.8* PHOS 5.2* Recent Labs  
  01/22/19 
1159 ALB 2.2* No results for input(s): INR, PTP, APTT in the last 72 hours. No lab exists for component: INREXT, INREXT No results for input(s): FE, TIBC, PSAT, FERR in the last 72 hours. No results found for: FOL, RBCF No results for input(s): PH, PCO2, PO2 in the last 72 hours. No results for input(s): CPK, CKNDX, TROIQ in the last 72 hours. No lab exists for component: CPKMB No results found for: CHOL, CHOLX, CHLST, CHOLV, HDL, LDL, LDLC, DLDLP, TGLX, TRIGL, TRIGP, CHHD, CHHDX Lab Results Component Value Date/Time Glucose (POC) 159 (H) 01/22/2019 10:48 AM  
 Glucose (POC) 142 (H) 01/22/2019 06:25 AM  
 Glucose (POC) 152 (H) 01/21/2019 09:15 PM  
 Glucose (POC) 140 (H) 01/21/2019 03:58 PM  
 Glucose (POC) 159 (H) 01/21/2019 11:12 AM  
 
Lab Results Component Value Date/Time Color YELLOW/STRAW 01/18/2019 12:44 PM  
 Appearance CLOUDY (A) 01/18/2019 12:44 PM  
 Specific gravity 1.015 01/18/2019 12:44 PM  
 pH (UA) 5.0 01/18/2019 12:44 PM  
 Protein 100 (A) 01/18/2019 12:44 PM  
 Glucose NEGATIVE  01/18/2019 12:44 PM  
 Ketone NEGATIVE  01/18/2019 12:44 PM  
 Bilirubin NEGATIVE  01/18/2019 12:44 PM  
 Urobilinogen 0.2 01/18/2019 12:44 PM  
 Nitrites NEGATIVE  01/18/2019 12:44 PM  
 Leukocyte Esterase TRACE (A) 01/18/2019 12:44 PM  
 Epithelial cells FEW 01/18/2019 12:44 PM  
 Bacteria 1+ (A) 01/18/2019 12:44 PM  
 WBC 0-4 01/18/2019 12:44 PM  
 RBC 10-20 01/18/2019 12:44 PM  
 
 
 
Medications Reviewed:  
 
Current Facility-Administered Medications Medication Dose Route Frequency  albuterol (PROVENTIL VENTOLIN) nebulizer solution 2.5 mg  2.5 mg Nebulization Q2H PRN  
 morphine 10 mg/ml injection 10 mg  10 mg Inhalation Q1H PRN  
 acetaminophen (TYLENOL) tablet 650 mg  650 mg Oral Q4H PRN Or  
 acetaminophen (TYLENOL) solution 650 mg  650 mg Oral Q4H PRN  Or  
 acetaminophen (TYLENOL) suppository 650 mg  650 mg Rectal Q4H PRN  
 morphine (ROXANOL) 100 mg/5 mL (20 mg/mL) concentrated solution 10 mg  10 mg Oral Q1H PRN  
 sodium chloride (OCEAN) 0.65 % nasal squeeze bottle 2 Spray  2 Spray Both Nostrils Q4H  
 LORazepam (ATIVAN) injection 1 mg  1 mg IntraVENous Q4H PRN  
 levothyroxine (SYNTHROID) tablet 100 mcg  100 mcg Oral Once per day on Mon Tue Wed Thu Fri Sat  
 sodium chloride (NS) flush 5-40 mL  5-40 mL IntraVENous Q8H  
 sodium chloride (NS) flush 5-40 mL  5-40 mL IntraVENous PRN  
 vits A and D-white pet-lanolin (A&D) ointment   Topical BID  
  miconazole (SECURA) 2 % extra thick cream   Topical BID  
 balsam peru-castor oil (VENELEX) ointment   Topical BID  
 
______________________________________________________________________ EXPECTED LENGTH OF STAY: 4d 2h 
ACTUAL LENGTH OF STAY:          6 Richard Tapia MD

## 2019-01-23 NOTE — PROGRESS NOTES
Bedside shift change report given to [address] (oncoming nurse) by Evan Hill (offgoing nurse). Report included the following information SBAR, Kardex, MAR and Recent Results.

## 2019-01-23 NOTE — PROGRESS NOTES
Problem: Falls - Risk of 
Goal: *Absence of Falls Document Saravanan Dust Fall Risk and appropriate interventions in the flowsheet. Outcome: Progressing Towards Goal 
Fall Risk Interventions: 
Mobility Interventions: Bed/chair exit alarm Mentation Interventions: Adequate sleep, hydration, pain control Medication Interventions: Evaluate medications/consider consulting pharmacy Elimination Interventions: Patient to call for help with toileting needs History of Falls Interventions: Door open when patient unattended Problem: Pressure Injury - Risk of 
Goal: *Prevention of pressure injury Document Bandar Scale and appropriate interventions in the flowsheet. Outcome: Progressing Towards Goal 
Pressure Injury Interventions: 
Sensory Interventions: Assess need for specialty bed, Assess changes in LOC Moisture Interventions: Apply protective barrier, creams and emollients, Absorbent underpads Activity Interventions: Pressure redistribution bed/mattress(bed type) Mobility Interventions: Pressure redistribution bed/mattress (bed type) Nutrition Interventions: Offer support with meals,snacks and hydration Friction and Shear Interventions: Apply protective barrier, creams and emollients, Foam dressings/transparent film/skin sealants, Lift sheet Problem: Diabetes Self-Management Goal: *Incorporating physical activity into lifestyle State effect of exercise on blood glucose levels. Outcome: Progressing Towards Goal 
No longer checking sugars as patient is now comfort care.

## 2019-01-24 NOTE — PROGRESS NOTES
Bedside shift change report given to Sally Cervantes RN (oncoming nurse) by Pooja Vitale RN (offgoing nurse). Report included the following information SBAR, MAR and Recent Results.

## 2019-01-24 NOTE — PROGRESS NOTES
Problem: Falls - Risk of 
Goal: *Absence of Falls Document Leana Zelaya Fall Risk and appropriate interventions in the flowsheet. Outcome: Progressing Towards Goal 
Fall Risk Interventions: 
Mobility Interventions: Bed/chair exit alarm Mentation Interventions: Adequate sleep, hydration, pain control, Bed/chair exit alarm Medication Interventions: Evaluate medications/consider consulting pharmacy Elimination Interventions: Patient to call for help with toileting needs History of Falls Interventions: Bed/chair exit alarm, Door open when patient unattended Problem: Pressure Injury - Risk of 
Goal: *Prevention of pressure injury Document Bandar Scale and appropriate interventions in the flowsheet. Outcome: Progressing Towards Goal 
Pressure Injury Interventions: 
Sensory Interventions: Assess changes in LOC Moisture Interventions: Apply protective barrier, creams and emollients, Absorbent underpads, Maintain skin hydration (lotion/cream) Activity Interventions: Pressure redistribution bed/mattress(bed type) Mobility Interventions: Pressure redistribution bed/mattress (bed type) Nutrition Interventions: Document food/fluid/supplement intake Friction and Shear Interventions: Apply protective barrier, creams and emollients

## 2019-01-24 NOTE — HOSPICE
[facility] RN note: In to evaluate pt and discuss disposition with pt's sister Keith Alonso and her . Discussed possible discharge to [facility] once a bed becomes available at routine level of care. Family aware of cost for room and board. Reportedly pt would not qualify for medicaid or financial assistance from the [facility]. Pt does not qualify for inpt hospice at [facility] due to a projected prognosis longer than hours to days. Pt continues to converse with family though is clearly declining. Family is clear that home with hospice is not an option. Hospice to evaluate pt tomorrow for possible transfer to [facility] if a bed becomes available. Thank you for the opportunity to care for this pt and family. Please contact hospice at [phone] with any questions or concerns.

## 2019-01-24 NOTE — PROGRESS NOTES
Problem: Falls - Risk of 
Goal: *Absence of Falls Document Ho Jarrell Fall Risk and appropriate interventions in the flowsheet. Outcome: Progressing Towards Goal 
Fall Risk Interventions: 
Mobility Interventions: Bed/chair exit alarm Mentation Interventions: Bed/chair exit alarm Medication Interventions: Bed/chair exit alarm Elimination Interventions: Bed/chair exit alarm History of Falls Interventions: Bed/chair exit alarm

## 2019-01-24 NOTE — PROGRESS NOTES
Bedside shift change report given to Butch Elias (oncoming nurse) by Danitza Campos RN (offgoing nurse). Report included the following information SBAR, Kardex, MAR and Recent Results.

## 2019-01-24 NOTE — PROGRESS NOTES
Follow up visit with Ms. Amber Mohinisonja. Pt was resting but responded when I gently spoke her name. She said hello and thanked me for coming, then appeared to sleep again. Offered words of comfort and spoken prayer. No family currently at bedside. Will continue to follow for support as able/needed. Ilene Roldan, Palliative

## 2019-01-24 NOTE — PROGRESS NOTES
Palliative Medicine Consult [place]: [phone]) Patient Name: Lauren Laureano YOB: 1935 Date of Initial Consult: 1/21/19 Reason for Consult: Care decisions Requesting Provider: Miguel Anderson Primary Care Physician: Evie Pruett MD 
 
 SUMMARY:  
Lauren Laureano \"Phillip" is a 81 y.o. with a past history of CHF, COPD, CKD, DM, HTN  who was admitted on 1/17/2019 from home via EMS w/ SOB. Lives alone and her sister was having a hard time reaching her. With AMS as well. Hypotensive in the ED, required IVF and placed on dopamine. Follows w/ [facility] cardiology, recently saw Dr Kassie Caceres and diuretics adjusted. Given poor prognosis, cardiology and primary team recommending hospice to sister Paras Anne. Code status DNR. Pt has made some improvements over the weekend but then declined. Moved to comfort measures 1/22/19. 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 NOK are sister Paras Anne (lives in [place]) and brother Aleta Francois (in [place]). No AMD. Had been living alone, independent w/ all ADLs, driving, social w/ friends going out weekly for breakfast. 
 
 
 PALLIATIVE DIAGNOSES:  
1. Shortness of breath 2. Fatigue, debility 3. Lethargy 4. Confusion and somnolence, improved over weekend 5. Goals of care PLAN:  
1. Visit w/ pt while getting cleaned up and turned by 2N team. Pt answering some questions, but confused although tells me she is 83. 
2. Pt does not express pain or SOB while being adjusted in the bed. 3. Pt has taken off her O2- if continues to do so, okay to leave off.  
4. RNs to continue to monitor for sx. 5. Hospice continues to follow for appropriateness for comfort bed. 6. Discussed care w/ Sunni, hospice RN and Kimberley JADE care management GOALS OF CARE / TREATMENT PREFERENCES:  
 
GOALS OF CARE: 
Patient/Health Care Proxy Stated Goals: Comfort TREATMENT PREFERENCES:  
Code Status: DNR- will need DDNR Advance Care Planning: 
[x] [facility] Interdisciplinary Team has updated the ACP Navigator with [address] and Patient Capacity Primary Decision Maker ([address]): Darryle Hint \"Tara\" and brother Marylou Cam Relationship to patient: siblings Phone number:[phone] [] Named in a scanned document  
[x] Legal Next of Kin 
[] Guardian Secondary Decision Maker (First Alternate Health Care Agent):  
Relationship to patient: 
Phone number: 
[] Named in a scanned document  
[] Legal Next of Kin 
[] Guardian Medical Interventions: Comfort measures Other Instructions: Other: As far as possible, the palliative care team has discussed with patient / health care proxy about goals of care / treatment preferences for patient. HISTORY:  
 
 
CHIEF COMPLAINT: SOB, fatigue , confusion HPI/SUBJECTIVE: The patient is:  
[x] Verbal and participatory [] Non-participatory due to:  
 
Pt on NC O2, although takes it off at times which is okay- told staff that she can leave off if bothers her. No signs pain. Clinical Pain Assessment (nonverbal scale for severity on nonverbal patients):  
Clinical Pain Assessment Severity: 0 Activity (Movement): Lying quietly, normal position Duration: for how long has pt been experiencing pain (e.g., 2 days, 1 month, years) Frequency: how often pain is an issue (e.g., several times per day, once every few days, constant) FUNCTIONAL ASSESSMENT:  
 
Palliative Performance Scale (PPS): PPS: 20 
 
 
 PSYCHOSOCIAL/SPIRITUAL SCREENING:  
 
Palliative IDT has assessed this patient for cultural preferences / practices and a referral made as appropriate to needs (Cultural Services, Patient Advocacy, Ethics, etc.) Any spiritual / Pentecostal concerns: 
[] Yes /  [x] No 
 
Caregiver Burnout: 
[] Yes /  [x] No /  [] No Caregiver Present Anticipatory grief assessment: [x] Normal  / [] Maladaptive ESAS Anxiety: Anxiety: 2 ESAS Depression: Depression: 0 REVIEW OF SYSTEMS:  
 
Positive and pertinent negative findings in ROS are noted above in HPI. The following systems were [x] reviewed / [] unable to be reviewed as noted in HPI Other findings are noted below. Systems: constitutional, ears/nose/mouth/throat, respiratory, gastrointestinal, genitourinary, musculoskeletal, integumentary, neurologic, psychiatric, endocrine. Positive findings noted below. Modified ESAS Completed by: provider Fatigue: 8 Drowsiness: 5 Depression: 0 Pain: 0 Anxiety: 2 Nausea: 0 Anorexia: 3 Dyspnea: 3 Stool Occurrence(s): 1 PHYSICAL EXAM:  
 
From RN flowsheet: 
Wt Readings from Last 3 Encounters:  
01/24/19 159 lb 6.3 oz (72.3 kg) 01/10/19 150 lb (68 kg) 08/01/18 134 lb 12.8 oz (61.1 kg) Blood pressure 114/68, pulse 62, temperature 96.1 °F (35.6 °C), resp. rate 20, height 5' 1\" (1.549 m), weight 159 lb 6.3 oz (72.3 kg), SpO2 (!) 85 %. Pain Scale 1: Numeric (0 - 10) Pain Intensity 1: 0 Constitutional: awake, lethargic, tells me she is 81 (I don't ask her this). Eyes: pupils equal, anicteric ENMT: no nasal discharge, dry mucous membranes Respiratory: breathing unlabored Musculoskeletal: no deformity, no tenderness to palpation Skin: warm, dry Neurologic:  moving all extremities, some confusion Psychiatric: cannot assess HISTORY:  
 
Principal Problem: 
  Acute exacerbation of CHF (congestive heart failure) ([address] (1/17/2019) Active Problems: 
  SOB (shortness of breath) (1/17/2019) Hypoxia (1/17/2019) Inability to walk (1/17/2019) Acute-on-chronic kidney injury ([address] (1/17/2019) Obtundation (1/19/2019) Past Medical History:  
Diagnosis Date  MORGAN (acute kidney injury) ([address] 3/25/2018  Arthritis  Chronic obstructive pulmonary disease ([address] 7/23/2017  Cor pulmonale ([facility] [address] 2017  Diabetes ([facility] [address]  Elevated brain natriuretic peptide (BNP) level 2017  Essential hypertension 2017  Shortness of breath  Thyroid disease Past Surgical History:  
Procedure Laterality Date  HX CATARACT REMOVAL Bilateral   
  
Family History Problem Relation Age of Onset  Diabetes Brother History reviewed, no pertinent family history. Social History Tobacco Use  Smoking status: Former Smoker Last attempt to quit: 2000 Years since quittin.0  Smokeless tobacco: Never Used Substance Use Topics  Alcohol use: No  
 
Allergies Allergen Reactions  Ace Inhibitors Other (comments) Elevated creatinine  Other Medication Rash Auromycin Chlorocetin Current Facility-Administered Medications Medication Dose Route Frequency  albuterol (PROVENTIL VENTOLIN) nebulizer solution 2.5 mg  2.5 mg Nebulization Q2H PRN  
 acetaminophen (TYLENOL) tablet 650 mg  650 mg Oral Q4H PRN Or  
 acetaminophen (TYLENOL) solution 650 mg  650 mg Oral Q4H PRN Or  
 acetaminophen (TYLENOL) suppository 650 mg  650 mg Rectal Q4H PRN  
 morphine (ROXANOL) 100 mg/5 mL (20 mg/mL) concentrated solution 10 mg  10 mg Oral Q1H PRN  
 sodium chloride (OCEAN) 0.65 % nasal squeeze bottle 2 Spray  2 Spray Both Nostrils Q4H  
 vits A and D-white pet-lanolin (A&D) ointment   Topical BID  miconazole (SECURA) 2 % extra thick cream   Topical BID  
 balsam peru-castor oil (VENELEX) ointment   Topical BID  
 
 
 
 LAB AND IMAGING FINDINGS:  
 
Lab Results Component Value Date/Time WBC 4.9 2019 02:18 AM  
 HGB 14.0 2019 02:18 AM  
 PLATELET 81 (L)  02:18 AM  
 
Lab Results Component Value Date/Time  Sodium 147 (H) 2019 11:59 AM  
 Potassium 4.6 2019 11:59 AM  
 Chloride 117 (H) 2019 11:59 AM  
 CO2 21 2019 11:59 AM  
 BUN 87 (H) 2019 11:59 AM  
 Creatinine 3.15 (H) 01/22/2019 11:59 AM  
 Calcium 7.8 (L) 01/22/2019 11:59 AM  
 Magnesium 2.3 01/19/2019 05:41 AM  
 Phosphorus 5.2 (H) 01/22/2019 11:59 AM  
  
Lab Results Component Value Date/Time AST (SGOT) 33 01/17/2019 05:22 PM  
 Alk. phosphatase 134 (H) 01/17/2019 05:22 PM  
 Protein, total 5.8 (L) 01/17/2019 05:22 PM  
 Albumin 2.2 (L) 01/22/2019 11:59 AM  
 Globulin 3.3 01/17/2019 05:22 PM  
 
No results found for: INR, PTMR, PTP, PT1, PT2, APTT No results found for: IRON, FE, TIBC, IBCT, PSAT, FERR No results found for: PH, PCO2, PO2 No components found for: [place] Lab Results Component Value Date/Time CK 63 01/18/2019 02:24 PM  
  
 
 
   
 
Total time: 15 min Counseling / coordination time, spent as noted above: 10 min  
> 50% counseling / coordination?: yes Prolonged service was provided for  []30 min   []75 min in face to face time in the presence of the patient, spent as noted above. Time Start:  
Time End:  
Note: this can only be billed with 37690 (initial) or 54728 (follow up). If multiple start / stop times, list each separately.

## 2019-01-24 NOTE — PROGRESS NOTES
Hospitalist Progress Note Lima Thurman MD 
Answering service: [phone] OR [phone] from in house phone Date of Service:  2019 NAME:  Sawyer Arroyo :  1935 MRN:  [id number] Admission Summary: An 81-year-old white female with past medical history of chronic kidney disease, arthritis, COPD, cor pulmonale, CHF, hypertension, type 2 diabetes mellitus, hypothyroidism, gait abnormality presented to the emergency department from home via EMS with chief complaint of shortness of breath. No generalized weakness. The patient is a limited historian, majority of history was obtained from review of ED and electronic medical record. Per the collective reports, the patient's sister tried to call the patient, became worried when she had not received an answer. She reportedly went to check on the patient and found that the patient was unable to answer the phone. Patient reports that she fell. Interval history / Subjective:  
 
2019 : 
Declines to non verbal else very lethargic /asleep on rounds. Low bp of  78 syst and pulse of 51 noted over last 12 hours, pt is now comfort care only, , pt does appear comfortable 2019 : 
Full comfort care, orders adjusted to recognize this and effect thereof Assessment & Plan: 1. Chronic congestive heart failure NYHA 3-4 :  
Acute on chronic diastolic heart failure w/cor pulmonale (POA) in the setting of Lasix IV (ED), Dopamine gtt and Cardiology consulted 
- does not look volume overloaded her MM dry - per sister not eating / drinking well last few days 
- her BP low. hold BP meds and Diuretics 
- gentle hydration for now 
- higher BNP is sometime non specific in renal failure  
-cardiology tried dopamine drip  but  notes from Dr. Darrell Kinney states : \" On dopamine would d/c and pursue palliative care\" = comfort care only as of pm . 2.  Chronic hypoxic respiratory failure :  
- unclear etiology uses baseline home o2 . Possibly due to CHF 
-  Continue oxygen therapy and pulse telemetry monitoring. 
- CXR - mild interstitial edema and small pleural effusions. - currently placed on ventimask - changed to nc 3. Generalized weakness/debility.   
- Place on fall precautions. Reported fall at home get CK 
-  patient lives alone and not able to care for herself. - comfort care, bed ridden 1/22 - forward to 1/24/2019 =  
 
4. Type 2 diabetes mellitus. - Place on Humalog insulin correction coverage, schedule Accu-Chek and check hemoglobin A1c level. lantus on hold= now only comfort meds, insulin is not a comfort med 7. Bilateral lower extremity edema. Keep legs elevated at rest.  Continue strict I's and O's. 
 
8.  Thrombocytopenia.  will monitor. No signs of bleeding. Hold any A/C- no further lab 1/22 forward as comfort care only 9. Dizziness. Place on fall precautions. May be due to hypotension 
-  CT of the head without contrast to rule out any acute process. 10.  Bradycardia. Place hold parameters for beta blockers. Was on coreg 11. Acute superimposed on chronic kidney disease. Hypernatremia 
- nephrology on board - Renal us: Small kidneys with normal echogenicity without hydronephrosis. - IVF per nephro 
- will monitor renal function 12. Hypothermia  resolved 
- could be due to sepsis 
- TSH low - levothyroxine adjusted - provide karen maher Palliative care consulted 1/19: Discussed in length with sister Marv Mcdowell regarding poor prognosis and she is in agreement. She does not wish to do anything aggressive measures and agreed to talk to hospice. She would prefer if the patient can be inpatient hospice. Hospice consult placed.  = seen 1/22 and daily as needed to assist w dispo as can  
 
1/22: discussed again with sister Gatito Grande as to care plans (plans of care) and decision is to go with comfort measures only 1/22/2019 forward. Downgraded to remote tele 1/20: hoping to have palliative care  and hospice discussions tomorrow with sister Georgie Rodríguez 
 
1/21: hospice meeting at 2pm today 1/22 comfort care only . Code status: DNR 
DVT prophylaxis: SCD Care Plan discussed with: Patient/Family and Nurse Disposition: TBD. Skin :  
 
Patient is alert, communicative and requires assist with repositioning.   
Bed: [place] Patient wearing briefs for incontinence. Patient reports no pain. Heels offloaded on prevalon boots. 
  
POA Left heel, Pressure Injury DTI:  0.8 X 0.8 x 0 cm non blanching maroon. POA Right heel, Pressure Injury DTI: 0.5  x 0.5 x 0 cm diffuse non blanching maroon. POA Left buttock, Pressure Injury DTI:  2 x 1 x 0 cm; non blanching purple POA Right buttock, Pressure Injury DTI:  3 x 0.5 0 cm; non blanching purple Bilateral groin, red moist rash consistent with Candidiais Bilateral hands, multiple areas of linear dry abrasions from unknown source. 
  
Recommendations:   
Bilateral hands:  Twice daily apply Vitamin A&D ointment. Bilateral heel, sacrum and buttocks:  Twice daily apply Venelex. Bilateral groin:  Twice daily apply Secura antifungal cream. 
  
Skin Care & Pressure Prevention: 
Minimize layers of linen/pads under patient to optimize support surface.   
Turn/reposition approximately every 2 hours and offload heels. Manage incontinence / promote continence Specialty bed: Remer. Use only flat sheet and one incontinence pad. Please call [facility]  AA patient is transferred and order an [facility] on [facility] bed. 
  
Discussed above plan with patient and RN. Hospital Problems  Date Reviewed: 1/19/2019 Codes Class Noted POA Obtundation ICD-10-CM: R40.1 ICD-9-CM: 780.09  1/19/2019 Unknown  
   
 SOB (shortness of breath) ICD-10-CM: R06.02 
ICD-9-CM: 786.05  1/17/2019 Unknown Hypoxia ICD-10-CM: R09.02 
ICD-9-CM: 799.02  1/17/2019 Unknown Inability to walk ICD-10-CM: R26.2 ICD-9-CM: 719.7  1/17/2019 Unknown * (Principal) Acute exacerbation of CHF (congestive heart failure) (HCC) ICD-10-CM: I50.9 ICD-9-CM: 428.0  1/17/2019 Unknown Acute-on-chronic kidney injury ([facility] [address] ICD-10-CM: N17.9, N18.9 ICD-9-CM: 584.9, 585.9  1/17/2019 Unknown Review of Systems:  
Review of systems not obtained due to patient factors. Vital Signs:  
 Last 24hrs VS reviewed since prior progress note. Most recent are: 
Visit Vitals /68 (BP 1 Location: Left arm, BP Patient Position: At rest) Pulse 62 Temp 96.1 °F (35.6 °C) Resp 20 Ht 5' 1\" (1.549 m) Wt 72.3 kg (159 lb 6.3 oz) SpO2 (!) 85% BMI 30.12 kg/m² Intake/Output Summary (Last 24 hours) at 1/24/2019 1011 Last data filed at 1/23/2019 1933 Gross per 24 hour Intake 240 ml Output  Net 240 ml Physical Examination:  
 
 
     
Constitutional:  chronically ill looking ENT:  Oral mucous dry,  Neck supple Resp: Decreased breath sounds at bases CV:  Regular rhythm, normal rate GI:  Soft, non distended, non tender. normoactive bowel sounds Musculoskeletal:  warm, 1 pulses throughout Neurologic:    Grossly  lethargic Data Review:  
 no further lab , vs noted. Labs:  
 
No results for input(s): WBC, HGB, HCT, PLT, HGBEXT, HCTEXT, PLTEXT, HGBEXT, HCTEXT, PLTEXT in the last 72 hours. Recent Labs  
  01/22/19 
1159 * K 4.6 * CO2 21 BUN 87* CREA 3.15* * CA 7.8* PHOS 5.2* Recent Labs  
  01/22/19 
1159 ALB 2.2* No results for input(s): INR, PTP, APTT in the last 72 hours. No lab exists for component: INREXT, INREXT No results for input(s): FE, TIBC, PSAT, FERR in the last 72 hours. No results found for: FOL, RBCF No results for input(s): PH, PCO2, PO2 in the last 72 hours. No results for input(s): CPK, CKNDX, TROIQ in the last 72 hours. No lab exists for component: CPKMB No results found for: CHOL, CHOLX, CHLST, CHOLV, HDL, LDL, LDLC, DLDLP, TGLX, TRIGL, TRIGP, CHHD, CHHDX Lab Results Component Value Date/Time Glucose (POC) 159 (H) 01/22/2019 10:48 AM  
 Glucose (POC) 142 (H) 01/22/2019 06:25 AM  
 Glucose (POC) 152 (H) 01/21/2019 09:15 PM  
 Glucose (POC) 140 (H) 01/21/2019 03:58 PM  
 Glucose (POC) 159 (H) 01/21/2019 11:12 AM  
 
Lab Results Component Value Date/Time Color YELLOW/STRAW 01/18/2019 12:44 PM  
 Appearance CLOUDY (A) 01/18/2019 12:44 PM  
 Specific gravity 1.015 01/18/2019 12:44 PM  
 pH (UA) 5.0 01/18/2019 12:44 PM  
 Protein 100 (A) 01/18/2019 12:44 PM  
 Glucose NEGATIVE  01/18/2019 12:44 PM  
 Ketone NEGATIVE  01/18/2019 12:44 PM  
 Bilirubin NEGATIVE  01/18/2019 12:44 PM  
 Urobilinogen 0.2 01/18/2019 12:44 PM  
 Nitrites NEGATIVE  01/18/2019 12:44 PM  
 Leukocyte Esterase TRACE (A) 01/18/2019 12:44 PM  
 Epithelial cells FEW 01/18/2019 12:44 PM  
 Bacteria 1+ (A) 01/18/2019 12:44 PM  
 WBC 0-4 01/18/2019 12:44 PM  
 RBC 10-20 01/18/2019 12:44 PM  
 
 
 
Medications Reviewed:  
 
Current Facility-Administered Medications Medication Dose Route Frequency  albuterol (PROVENTIL VENTOLIN) nebulizer solution 2.5 mg  2.5 mg Nebulization Q2H PRN  
 acetaminophen (TYLENOL) tablet 650 mg  650 mg Oral Q4H PRN Or  
 acetaminophen (TYLENOL) solution 650 mg  650 mg Oral Q4H PRN Or  
 acetaminophen (TYLENOL) suppository 650 mg  650 mg Rectal Q4H PRN  
 morphine (ROXANOL) 100 mg/5 mL (20 mg/mL) concentrated solution 10 mg  10 mg Oral Q1H PRN  
 sodium chloride (OCEAN) 0.65 % nasal squeeze bottle 2 Spray  2 Spray Both Nostrils Q4H  
 vits A and D-white pet-lanolin (A&D) ointment   Topical BID  miconazole (SECURA) 2 % extra thick cream   Topical BID  
 balsam peru-castor oil (VENELEX) ointment   Topical BID  
 
 ______________________________________________________________________ EXPECTED LENGTH OF STAY: 4d 2h 
ACTUAL LENGTH OF STAY:          7 Arlin Watters MD

## 2019-01-24 NOTE — PROGRESS NOTES
01/24/19 0820 Vital Signs Temp 96.1 °F (35.6 °C) Temp Source Axillary Pulse (Heart Rate) 62 Heart Rate Source Monitor Resp Rate 20  
O2 Sat (%) (!) 85 % Level of Consciousness Responds to Voice /68 MAP (Calculated) 83 BP 1 Method Automatic  
BP 1 Location Left arm BP Patient Position At rest  
MEWS Score 2 MD aware.  Camryn Chill

## 2019-01-25 NOTE — PROGRESS NOTES
Problem: Falls - Risk of 
Goal: *Absence of Falls Document Francia Loya Fall Risk and appropriate interventions in the flowsheet. Outcome: Progressing Towards Goal 
Fall Risk Interventions: 
Mobility Interventions: Bed/chair exit alarm, Patient to call before getting OOB, Communicate number of staff needed for ambulation/transfer Mentation Interventions: Bed/chair exit alarm, Door open when patient unattended Medication Interventions: Bed/chair exit alarm Elimination Interventions: Call light in reach, Bed/chair exit alarm, Patient to call for help with toileting needs, Toileting schedule/hourly rounds History of Falls Interventions: Bed/chair exit alarm Problem: Pressure Injury - Risk of 
Goal: *Prevention of pressure injury Document Bandar Scale and appropriate interventions in the flowsheet. Outcome: Progressing Towards Goal 
Pressure Injury Interventions: 
Sensory Interventions: Assess changes in LOC Moisture Interventions: Absorbent underpads, Maintain skin hydration (lotion/cream) Activity Interventions: Pressure redistribution bed/mattress(bed type) Mobility Interventions: Pressure redistribution bed/mattress (bed type) Nutrition Interventions: Document food/fluid/supplement intake Friction and Shear Interventions: Apply protective barrier, creams and emollients, Feet elevated on foot rest

## 2019-01-25 NOTE — PROGRESS NOTES
[facility] Liaison note: 
 
Family to come today for consents, able to send patient to [facility] today for routine level of care. Will discuss FH and discharge plan should patient last longer than 3 weeks. Family aware of cost. 
Notified CM for tentative ambulance transport at [address] [facility]. Will notify CM if anything changes. Thank you, 
 
Leonard Mcgee RN [facility]

## 2019-01-25 NOTE — PROGRESS NOTES
I have reviewed discharge instructions [facility]. Mili BRAVO verbalized understanding.

## 2019-01-25 NOTE — PROGRESS NOTES
Informed of pt's upcoming transfer to the [facility] as a new Hospice pt. Pt who lay in bed, appeared to be resting but awoke at sound of pt's voice. No family present. Checked in with pt introducing self and role. Pt briefly engaged with  who led in processing regarding transition to [address] [place] [address] and associated feelings. Provided reassurance drawing from pt's identified [address]. Pt expressed appreciation for visit. Informed of Chaplain Wu's presence and availability at the [facility] for  support. Chaplains to follow up as able/needed. ANTONIO Bear. Alicia

## 2019-01-25 NOTE — PROGRESS NOTES
Brief: 
 
Pt relayed to me this evning 1/25/2019 5:44 PM that she is aware of her imminent death and wishes to be a DNR. She further understands what this means and pt is oriented to person , place and time. Lima Thurman MD 1/25/2019

## 2019-01-25 NOTE — H&P
[employer] Good Help to Those in Need 
[phone] Patient Name: Matilde Lockett YOB: 1935 Date of Provider Hospice Visit: 01/25/19 Level of Care:   [] General Inpatient (GIP)    [x] Routine   [] Respite Current Location of Care: 
[] [facility] [] [facility] [] [facility] [] [facility] [x] [facility], patient referred from: 
[x] [facility] [] [facility] [] [facility] [] [facility] [] Home [] Other:  
 
Date of Original Hospice Admission: 1/25/19 Hospice Medical Director at time of admission: Dr Toñito An Principle Hospice Diagnosis: end stage heart failure, combined systolic and diastolic CHF Hx of COPD Gabriela Lockett is a 81y.o. year old who was admitted to [facility]. The patient's principle diagnosis has resulted in severe weakness, fatigue, debility, anorexia, generalized pain and discomfort, shortness of breath, mildly agitated,some confusion, eats very little and has difficulty swallowing, lethargic, sleeps most of the day Functionally, the patient's Karnofsky and/or Palliative Performance Scale has declined over a period of days and is estimated at 20%. The patient is dependent on the following ADLs: 
 
Objective information that support this patients limited prognosis includes:     
history of RV dysfunction due to lung disease and EF of 45-50%, mild LVH, dilated right ventricle and PA pressure of 56 with left bundle branch block and a creatinine of 1.6. Hx of COPD and acute and chronic respiratory failure The patient/family chose comfort measures with the support of Hospice. HOSPICE DIAGNOSES Active Symptoms: 1. Shortness of breath 2. Fatigue and weakness 3. Anorexia 4. Generalized pain 5. Anxiety PLAN 1. Admit to [facility] for routine level of care 2. Comfort meds 3. Roxanol 10mg po q 1 hour as needed 4. Proventil nebs as needed 5. Lorazepam as needed 6. scopolamine patch 7.  and SW to support family needs 8. Disposition: routine level of care Prognosis estimated based on 01/25/19 clinical assessment is:  
[x] Hours to Days   
[] Days to Weeks   
[] Other: 
 
Communicated plan of care with: Hospice Case Manager; Hospice IDT; Care Team 
 
 GOALS OF CARE Resuscitation Status: DNR Durable DNR: [x] Yes [] No 
 
Primary Decision Maker (Health Care Agent): Kamari Spaulding,  Relationship to patient: 
Phone number: 
[] Named in a scanned document  
[x] Legal Next of Kin 
[] Guardian Secondary Decision Maker (First Alternate Health Care Agent):  
Relationship to patient: 
Phone number: 
[] Named in a scanned document  
[] Legal Next of Kin 
[] Guardian ACP documents you current have include: 
[] Advance Directive or Living Will 
[] Durable Do Not Resuscitate 
[] Physician Orders for Scope of Treatment (POST) [] Medical Power of  
[] Other HISTORY History obtained from: chart, CM, SN 
 
CHIEF COMPLAINT:  
The patient is:  
[x] Verbal minimally [] Nonverbal 
[] Unresponsive HPI/SUBJECTIVE: 81year old female with end stage congestive heart fialure REVIEW OF SYSTEMS The following systems were: [x] reviewed  [] unable to be reviewed Positive ROS include: 
Constitutional: fatigue, weakness, in pain, short of breath Ears/nose/mouth/throat: increased airway secretions Respiratory:shortness of breath, wheezing Gastrointestinal:poor appetite Musculoskeletal:generalized pain, Neurologic:confusion,  weakness Psychiatric:anxiety Adult Non-Verbal Pain Assessment Score: denies currently Face 
[] 0   No particular expression or smile 
[] 1   Occasional grimace, tearing, frowning, wrinkled forehead 
[] 2   Frequent grimace, tearing, frowning, wrinkled forehead Activity (movement) [] 0   Lying quietly, normal position 
[] 1   Seeking attention through movement or slow, cautious movement 
[] 2   Restless, excessive activity and/or withdrawal reflexes Guarding [] 0   Lying quietly, no positioning of hands over areas of body 
[] 1   Splinting areas of the body, tense 
[] 2   Rigid, stiff Physiology (vital signs) 
[] 0   Stable vital signs [] 1   Change in any of the following: SBP > 20mm Hg; HR > 20/minute 
[] 2   Change in any of the following: SBP > 30mm Hg; HR > 25/minute Respiratory 
[] 0   Baseline RR/SpO2, compliant with ventilator 
[] 1   RR > 10 above baseline, or 5% drop SpO2, mild asynchrony with ventilator 
[] 2   RR > 20 above baseline, or 10% drop SpO2, asynchrony with ventilator FUNCTIONAL ASSESSMENT Palliative Performance Scale (PPS): 10-20% PSYCHOSOCIAL/SPIRITUAL ASSESSMENT Principal Problem: 
  Acute exacerbation of CHF (congestive heart failure) ([facility] [address] (2019) Active Problems: 
  SOB (shortness of breath) (2019) Hypoxia (2019) Inability to walk (2019) Acute-on-chronic kidney injury ([facility] [address] (2019) Obtundation (2019) Past Medical History:  
Diagnosis Date  MORGAN (acute kidney injury) ([facility] [address] 3/25/2018  Arthritis  Chronic obstructive pulmonary disease ([facility] [address] 2017  Cor pulmonale ([address] 2017  Diabetes ([facility] [address]  Elevated brain natriuretic peptide (BNP) level 2017  Essential hypertension 2017  Shortness of breath  Thyroid disease Past Surgical History:  
Procedure Laterality Date  HX CATARACT REMOVAL Bilateral   
  
Social History Tobacco Use  Smoking status: Former Smoker Last attempt to quit: 2000 Years since quittin.0  Smokeless tobacco: Never Used Substance Use Topics  Alcohol use: No  
 
Family History Problem Relation Age of Onset  Diabetes Brother Allergies Allergen Reactions  Ace Inhibitors Other (comments) Elevated creatinine  Other Medication Rash Auromycin Chlorocetin Current Facility-Administered Medications Medication Dose Route Frequency  albuterol (PROVENTIL VENTOLIN) nebulizer solution 2.5 mg  2.5 mg Nebulization Q2H PRN  
 acetaminophen (TYLENOL) tablet 650 mg  650 mg Oral Q4H PRN Or  
 acetaminophen (TYLENOL) solution 650 mg  650 mg Oral Q4H PRN Or  
 acetaminophen (TYLENOL) suppository 650 mg  650 mg Rectal Q4H PRN  
 morphine (ROXANOL) 100 mg/5 mL (20 mg/mL) concentrated solution 10 mg  10 mg Oral Q1H PRN  
 sodium chloride (OCEAN) 0.65 % nasal squeeze bottle 2 Spray  2 Spray Both Nostrils Q4H  
 vits A and D-white pet-lanolin (A&D) ointment   Topical BID  miconazole (SECURA) 2 % extra thick cream   Topical BID  
 balsam peru-castor oil (VENELEX) ointment   Topical BID PHYSICAL EXAM  
 
Wt Readings from Last 3 Encounters:  
01/25/19 72.3 kg (159 lb 6.3 oz) 01/10/19 68 kg (150 lb) 08/01/18 61.1 kg (134 lb 12.8 oz) Visit Vitals /69 (BP 1 Location: Left arm, BP Patient Position: At rest) Pulse 75 Temp 97.3 °F (36.3 °C) Resp 16 Ht 5' 1\" (1.549 m) Wt 72.3 kg (159 lb 6.3 oz) SpO2 93% BMI 30.12 kg/m² Supplemental O2  [x] Yes  [] NO 
  
 
Currently this patient has: 
[] Peripheral IV [] PICC  [] PORT [] ICD [] Leigh Catheter [] NG Tube   [] PEG Tube   
[] Rectal Tube [] Drain 
[] Other:  
 
Constitutional: minimally responsive Eyes: pupils equal, anicteric ENMT: no nasal discharge, moist mucous membranes Cardiovascular: regular rhythm, distal pulses intact Respiratory: breathing not labored, symmetric Gastrointestinal: soft non-tender, +bowel sounds Musculoskeletal: no deformity, no tenderness to palpation Skin: warm, dry, pale, lips blue Neurologic:pt is/ not able to follow commands, pt is/ not moving all extremities Psychiatric:  confusion Pertinent Lab and or Imaging Tests: 
Lab Results Component Value Date/Time  Sodium 147 (H) 01/22/2019 11:59 AM  
 Potassium 4.6 01/22/2019 11:59 AM  
 Chloride 117 (H) 01/22/2019 11:59 AM  
 CO2 21 01/22/2019 11:59 AM  
 Anion gap 9 01/22/2019 11:59 AM  
 Glucose 147 (H) 01/22/2019 11:59 AM  
 BUN 87 (H) 01/22/2019 11:59 AM  
 Creatinine 3.15 (H) 01/22/2019 11:59 AM  
 BUN/Creatinine ratio 28 (H) 01/22/2019 11:59 AM  
 GFR est AA 17 (L) 01/22/2019 11:59 AM  
 GFR est non-AA 14 (L) 01/22/2019 11:59 AM  
 Calcium 7.8 (L) 01/22/2019 11:59 AM  
 
Lab Results Component Value Date/Time Protein, total 5.8 (L) 01/17/2019 05:22 PM  
 Albumin 2.2 (L) 01/22/2019 11:59 AM  
 
   
 
Total time: 60 
Counseling / coordination time: 45 
> 50% counseling / coordination?   
Clarisa Jones, NP

## 2019-01-25 NOTE — PROGRESS NOTES
Hospitalist Progress Note Ortiz Rios MD 
Answering service: [phone] OR 1966 from in house phone Date of Service:  2019 NAME:  Lauren Laureano :  1935 MRN:  [id number] Admission Summary: An 81-year-old white female with past medical history of chronic kidney disease, arthritis, COPD, cor pulmonale, CHF, hypertension, type 2 diabetes mellitus, hypothyroidism, gait abnormality presented to the emergency department from home via EMS with chief complaint of shortness of breath. No generalized weakness. The patient is a limited historian, majority of history was obtained from review of ED and electronic medical record. Per the collective reports, the patient's sister tried to call the patient, became worried when she had not received an answer. She reportedly went to check on the patient and found that the patient was unable to answer the phone. Patient reports that she fell. Interval history / Subjective:  
 
2019 : 
Declines to non verbal else very lethargic /asleep on rounds. Low bp of  78 syst and pulse of 51 noted over last 12 hours, pt is now comfort care only, , pt does appear comfortable 2019 : 
Full comfort care, orders adjusted to recognize this and effect thereof 2019 :  
Pt appears comfortable Pt oriented x 3, answers questions readily,  No c/o pain, no sob, is participating w staff on rounds indicatinog her likes and dislikes of foods offered, pt is being fed, not to take as confusion as pt is not just weak and needs assistance as eating is part of the comfort care we are providing vis the loving staff at [facility]. Assessment & Plan: 1. Chronic congestive heart failure NYHA 3-4 :  
Acute on chronic diastolic heart failure w/cor pulmonale (POA) in the setting of Lasix IV (ED), Dopamine gtt and Cardiology consulted 
- does not look volume overloaded her MM dry - per sister not eating / drinking well last few days 
- her BP low. hold BP meds and Diuretics 
- gentle hydration for now 
- higher BNP is sometime non specific in renal failure  
-cardiology tried dopamine drip 1/18 but 1/19 notes from Dr. Jordi Fatima states : \" On dopamine would d/c and pursue palliative care\" = comfort care only as of pm 1/22. 2.  Chronic hypoxic respiratory failure :  
- unclear etiology uses baseline home o2 . Possibly due to CHF 
-  Continue oxygen therapy and pulse telemetry monitoring. 
- CXR - mild interstitial edema and small pleural effusions. - currently placed on ventimask - changed to nc 3. Generalized weakness/debility.   
- Place on fall precautions. Reported fall at home get CK 
-  patient lives alone and not able to care for herself. - comfort care, bed ridden 1/22 - forward to 1/25/2019 =  
 
4. Type 2 diabetes mellitus. - Place on Humalog insulin correction coverage, schedule Accu-Chek and check hemoglobin A1c level. lantus on hold= now only comfort meds, insulin is not a comfort med 7. Bilateral lower extremity edema. Keep legs elevated at rest.  Continue strict I's and O's. 
 
8.  Thrombocytopenia.  will monitor. No signs of bleeding. Hold any A/C- no further lab 1/22 forward as comfort care only 9. Dizziness. Place on fall precautions. May be due to hypotension 
-  CT of the head without contrast to rule out any acute process. 10.  Bradycardia. Place hold parameters for beta blockers. Was on coreg 11. Acute superimposed on chronic kidney disease. Hypernatremia 
- nephrology on board - Renal us: Small kidneys with normal echogenicity without hydronephrosis. - IVF per nephro 
- will monitor renal function 12. Hypothermia  resolved 
- could be due to sepsis 
- TSH low - levothyroxine adjusted - provide karen maher Palliative care consulted 1/19: Discussed in length with sister Milton Reeder regarding poor prognosis and she is in agreement. She does not wish to do anything aggressive measures and agreed to talk to hospice. She would prefer if the patient can be inpatient hospice. Hospice consult placed. = seen 1/22 and daily as needed to assist w dispo as can  
 
1/22: discussed again with sister Delmi Rivera as to care plans (plans of care) and decision is to go with comfort measures only 1/22/2019 forward. Downgraded to remote tele 1/20: hoping to have palliative care  and hospice discussions tomorrow with sister Christian Amado 
 
1/21: hospice meeting at 2pm today 1/22 comfort care only . Code status: DNR 
DVT prophylaxis: SCD Care Plan discussed with: Patient/Family and Nurse Disposition: TBD. Skin :  
 
Patient is alert, communicative and requires assist with repositioning.   
Bed: [facility] Patient wearing briefs for incontinence. Patient reports no pain. Heels offloaded on prevalon boots. 
  
POA Left heel, Pressure Injury DTI:  0.8 X 0.8 x 0 cm non blanching maroon. POA Right heel, Pressure Injury DTI: 0.5  x 0.5 x 0 cm diffuse non blanching maroon. POA Left buttock, Pressure Injury DTI:  2 x 1 x 0 cm; non blanching purple POA Right buttock, Pressure Injury DTI:  3 x 0.5 0 cm; non blanching purple Bilateral groin, red moist rash consistent with Candidiais Bilateral hands, multiple areas of linear dry abrasions from unknown source. 
  
Recommendations:   
Bilateral hands:  Twice daily apply Vitamin A&D ointment. Bilateral heel, sacrum and buttocks:  Twice daily apply Venelex. Bilateral groin:  Twice daily apply Secura antifungal cream. 
  
Skin Care & Pressure Prevention: 
Minimize layers of linen/pads under patient to optimize support surface.   
Turn/reposition approximately every 2 hours and offload heels. Manage incontinence / promote continence Specialty bed: [place]. Use only flat sheet and one incontinence pad.  Please call [facility]  TW patient is transferred and order an Miguel on [employer] bed. 
  
Discussed above plan with patient and RN. Hospital Problems  Date Reviewed: 1/19/2019 Codes Class Noted POA Obtundation ICD-10-CM: R40.1 ICD-9-CM: 780.09  1/19/2019 Unknown  
   
 SOB (shortness of breath) ICD-10-CM: R06.02 
ICD-9-CM: 786.05  1/17/2019 Unknown Hypoxia ICD-10-CM: R09.02 
ICD-9-CM: 799.02  1/17/2019 Unknown Inability to walk ICD-10-CM: R26.2 ICD-9-CM: 719.7  1/17/2019 Unknown * (Principal) Acute exacerbation of CHF (congestive heart failure) (HCC) ICD-10-CM: I50.9 ICD-9-CM: 428.0  1/17/2019 Unknown Acute-on-chronic kidney injury ([facility] [address] ICD-10-CM: N17.9, N18.9 ICD-9-CM: 584.9, 585.9  1/17/2019 Unknown Review of Systems:  
Review of systems not obtained due to patient factors. Vital Signs:  
 Last 24hrs VS reviewed since prior progress note. Most recent are: 
Visit Vitals /47 (BP 1 Location: Left arm, BP Patient Position: At rest) Pulse 67 Temp 97.6 °F (36.4 °C) Resp 16 Ht 5' 1\" (1.549 m) Wt 72.3 kg (159 lb 6.3 oz) SpO2 90% BMI 30.12 kg/m² No intake or output data in the 24 hours ending 01/25/19 0805 Physical Examination:  
 
 
     
Constitutional:  chronically ill looking Neurologic:    Grossly  lethargic Data Review:  
 no further lab , vs noted. Labs:  
 
No results for input(s): WBC, HGB, HCT, PLT, HGBEXT, HCTEXT, PLTEXT, HGBEXT, HCTEXT, PLTEXT in the last 72 hours. Recent Labs  
  01/22/19 
1159 * K 4.6 * CO2 21 BUN 87* CREA 3.15* * CA 7.8* PHOS 5.2* Recent Labs  
  01/22/19 
1159 ALB 2.2* No results for input(s): INR, PTP, APTT in the last 72 hours. No lab exists for component: INREXT, INREXT No results for input(s): FE, TIBC, PSAT, FERR in the last 72 hours. No results found for: FOL, RBCF No results for input(s): PH, PCO2, PO2 in the last 72 hours. No results for input(s): CPK, CKNDX, TROIQ in the last 72 hours. No lab exists for component: CPKMB No results found for: CHOL, CHOLX, CHLST, CHOLV, HDL, LDL, LDLC, DLDLP, TGLX, TRIGL, TRIGP, CHHD, CHHDX Lab Results Component Value Date/Time Glucose (POC) 159 (H) 01/22/2019 10:48 AM  
 Glucose (POC) 142 (H) 01/22/2019 06:25 AM  
 Glucose (POC) 152 (H) 01/21/2019 09:15 PM  
 Glucose (POC) 140 (H) 01/21/2019 03:58 PM  
 Glucose (POC) 159 (H) 01/21/2019 11:12 AM  
 
Lab Results Component Value Date/Time Color YELLOW/STRAW 01/18/2019 12:44 PM  
 Appearance CLOUDY (A) 01/18/2019 12:44 PM  
 Specific gravity 1.015 01/18/2019 12:44 PM  
 pH (UA) 5.0 01/18/2019 12:44 PM  
 Protein 100 (A) 01/18/2019 12:44 PM  
 Glucose NEGATIVE  01/18/2019 12:44 PM  
 Ketone NEGATIVE  01/18/2019 12:44 PM  
 Bilirubin NEGATIVE  01/18/2019 12:44 PM  
 Urobilinogen 0.2 01/18/2019 12:44 PM  
 Nitrites NEGATIVE  01/18/2019 12:44 PM  
 Leukocyte Esterase TRACE (A) 01/18/2019 12:44 PM  
 Epithelial cells FEW 01/18/2019 12:44 PM  
 Bacteria 1+ (A) 01/18/2019 12:44 PM  
 WBC 0-4 01/18/2019 12:44 PM  
 RBC 10-20 01/18/2019 12:44 PM  
 
 
 
Medications Reviewed:  
 
Current Facility-Administered Medications Medication Dose Route Frequency  albuterol (PROVENTIL VENTOLIN) nebulizer solution 2.5 mg  2.5 mg Nebulization Q2H PRN  
 acetaminophen (TYLENOL) tablet 650 mg  650 mg Oral Q4H PRN Or  
 acetaminophen (TYLENOL) solution 650 mg  650 mg Oral Q4H PRN Or  
 acetaminophen (TYLENOL) suppository 650 mg  650 mg Rectal Q4H PRN  
 morphine (ROXANOL) 100 mg/5 mL (20 mg/mL) concentrated solution 10 mg  10 mg Oral Q1H PRN  
 sodium chloride (OCEAN) 0.65 % nasal squeeze bottle 2 Spray  2 Spray Both Nostrils Q4H  
 vits A and D-white pet-lanolin (A&D) ointment   Topical BID  miconazole (SECURA) 2 % extra thick cream   Topical BID  
 balsam peru-castor oil (VENELEX) ointment   Topical BID  
 
 ______________________________________________________________________ EXPECTED LENGTH OF STAY: 4d 2h 
ACTUAL LENGTH OF STAY:          8 Brooke Palacios MD

## 2019-01-25 NOTE — PROGRESS NOTES
Follow up visit with Ms. JULIANA [facility]. Pt shared that she is being moved to another facility [facility]) this afternoon. Explored how she is feeling about this move, and she indicated that she is [place]. Pt reflected on how much she enjoyed going out to eat with her friends. Normalized her sense of loss as she grieves the decline in her independence (going out with friends, driving, etc). Pt shared that prayer helps her cope during difficult times. Offered spoken prayer and assurance of prayers. Ilene Patrick, Palliative

## 2019-01-25 NOTE — PROGRESS NOTES
Bedside shift change report given to [facility] RN (oncoming nurse) by Author Raj RN (offgoing nurse). Report included the following information SBAR, MAR and Recent Results.

## 2019-01-25 NOTE — PROGRESS NOTES
Problem: Falls - Risk of 
Goal: *Absence of Falls Document Jesika Suresh Fall Risk and appropriate interventions in the flowsheet. Outcome: Progressing Towards Goal 
Fall Risk Interventions: 
Mobility Interventions: Bed/chair exit alarm, Patient to call before getting OOB, Communicate number of staff needed for ambulation/transfer Mentation Interventions: Bed/chair exit alarm, Door open when patient unattended Medication Interventions: Bed/chair exit alarm Elimination Interventions: Call light in reach, Bed/chair exit alarm, Patient to call for help with toileting needs, Toileting schedule/hourly rounds History of Falls Interventions: Bed/chair exit alarm Problem: Pressure Injury - Risk of 
Goal: *Prevention of pressure injury Document Bandar Scale and appropriate interventions in the flowsheet. Outcome: Progressing Towards Goal 
Pressure Injury Interventions: 
Sensory Interventions: Assess changes in LOC Moisture Interventions: Absorbent underpads, Maintain skin hydration (lotion/cream) Activity Interventions: Pressure redistribution bed/mattress(bed type) Mobility Interventions: Pressure redistribution bed/mattress (bed type) Nutrition Interventions: Document food/fluid/supplement intake Friction and Shear Interventions: Apply protective barrier, creams and emollients, Feet elevated on foot rest

## 2019-01-25 NOTE — PROGRESS NOTES
[facility] [employer] Liaison note: 
 
 
Spoke with patient and sister Carrie Sykes today at bedside. Patient was very clear in stating that she wanted her condominium and it's assets to be divided between both her sister Carrie Sykes and her brother Franklin Paul. She also stated that she wanted both her checking and savings account to be divided equally between [facility]. Carrie Sykes and Franklin Paul do not have this written anywhere. I did sign a note to that affect that I did hear her list her wishes and signed a note. Patient was very clear and was able to answer questions in regard to orientation. I found patient to be alert and oriented x3 and appropriate for conversation. There was not an available notary today in the hospital.  
 
Thank you, 
 
Lexy Calix, [facility] [employer]

## 2019-01-25 NOTE — PROGRESS NOTES
CM reviewed chart. [facility] plans to admit pt today at the hospice house. Family is on the way now to sign hospice paperwork, hospice asked for a 5pm transport time. CM set up transport for 5pm with [facility]. Ambulance packet is on chart.  
Pooja Morel, BSW, ACM

## 2019-01-25 NOTE — PROGRESS NOTES
[facility] Good Help to Those in Need 
[phone] Inpatient Nursing PRE Admission Patient Name: Darian Clarke YOB: 1935 Age: 81 y.o. Date of PLANNED Hospice Admission: 19 Hospice Attending: Dr. Emre Marsh Primary Care Physician: Cortez Flaherty MD 
  
Home Hospice Zip Code: [address] Expected  (if patient transferred to [facility]): TBD ADVANCE CARE PLANNING Code Status: DDNR Durable DNR: [x]  Yes  []  No 
Advance Care Planning 2019 Patient's Healthcare Decision Maker is: - Confirm Advance Directive None Cheyanne To, [phone], patients sister. Patient also has a brother. She was never , and no children Scientologist: Buddhism  Home: Sister has identified [employer] [place]- plans to be finalized in next day or so. HOSPICE SUMMARY  
ER Visits/ Hospitalizations in past year: 2 Hospice Diagnosis: End stage heart failure Onset Date of Hospice Diagnosis:  Summary of Disease Progression Leading to Hospice Diagnosis: Margoth Hoffman" is a 81 y.o. with a past history of CHF, COPD, CKD, DM, HTN  who was admitted on 2019 from home via EMS w/ SOB. Lives alone and her sister was having a hard time reaching her. With AMS as well. Hypotensive in the ED, required IVF and placed on dopamine. Follows / [facility] cardiology, recently saw Dr Herrera Ards and diuretics adjusted. Given poor prognosis, cardiology and primary team recommending hospice. She has a history of RV dysfunction due to lung disease and EF of 45-50%, mild LVH, dilated right ventricle and PA pressure of 56 with left bundle branch block and a creatinine of 1.6. She was admitted in 2018 with a combined diastolic and systolic failure, significant COPD on Coreg and Bumex. She was admitted in 2018 to [facility] with acute on chronic respiratory failure.   Her creatinine was 1.7 and she had a normal EF at that time, and looks like a lot of that is because her oxygen tank was off. She did not tolerate digoxin in the past and had been treated with oxygen and diuretic. She had a MD visit noting increasing shortness of breath and edema from a cor pulmonale. The patient received an  Increase in the Bumex to full 2 mg daily, got lab work, and had her come back. That lab work showed a BNP markedly elevated at 1494. Her ProBNP is now 18,553. Patient today is oxygen dependent on 6 liters NC, cyanotic at times, still verbal but does show some confusion, eats very little and has difficulty swallowing, lethargic, sleeps most of the day. Co-Morbidities:  
Patient Active Problem List  
Diagnosis Code  HAYS (dyspnea on exertion) R06.09  
 Chronic obstructive pulmonary disease ([facility]) J44.9  Essential hypertension I10  
 Cor pulmonale ([facility]) I27.81  
 Diabetes ([facility] E11.9  Chronic combined systolic and diastolic CHF (congestive heart failure) ([facility]) I50.42  
 COPD, severe ([facility]) J44.9  Type 2 diabetes with nephropathy ([facility]) E11.21  
 SOB (shortness of breath) R06.02  
 Hypoxia R09.02  
 Inability to walk R26.2  Acute exacerbation of CHF (congestive heart failure) ([facility]) I50.9  Acute-on-chronic kidney injury ([facility] N17.9, N18.9  Obtundation R40.1 Diagnoses RELATED to the terminal prognosis: HAYS, COPD, HTN, DM, hypoxia Other Diagnoses: MORGAN, dehydration, inability to walk Rationale for a prognosis of life expectancy of 6 months or less if the disease follows its normal course (Disease Specific History):  
 
Clarinda Duverney is a 81 y.o. who was admitted to [address] [place] [address]. The patient's principle diagnosis of heart failure has resulted in oxygen dependency, extreme fatigue, cyanosis on hands, and mouth, malnutrition, difficulty swallowing. Functionally, the patient's Palliative Performance Scale has declined over a period of one month and is estimated at 20. Objective information that support this patients limited prognosis includes: 
 
Chest xray: 
 
IMPRESSION: 
  
Cardiomegaly, mild interstitial edema and small pleural effusions. 1/18/2019  9:54 AM - Angel, Card Result In Component Value Ref Range & Units Status Ventricular Rate 55  BPM Final  
Atrial Rate 55  BPM Final  
P-R Interval 180  ms Final  
QRS Duration 108  ms Final  
Q-T Interval 560  ms Final  
QTC Calculation (Bezet) 535  ms Final  
Calculated P Axis 89  degrees Final  
Calculated R Axis 125  degrees Final  
Calculated T Axis 76  degrees Final  
Diagnosis   Final  
Sinus bradycardia Right axis deviation Pulmonary disease pattern Incomplete right bundle branch block Possible Right ventricular hypertrophy Septal infarct (cited on or before 17-MAY-2018) Prolonged QT When compared with ECG of 17-MAY-2018 15:22,  
Vent. rate has decreased BY  32 BPM  
Questionable change in initial forces of Septal leads Nonspecific T wave abnormality, improved in Anterior leads QT has lengthened Confirmed by Juliane Palmer MD, Lila Herrmann ([id number]) on 1/18/2019 9:54:15 AM   
 
Results for Jonathan Moody (MRN [id number]) as of 1/25/2019 14:23 Ref. Range 1/17/2019 17:22 1/18/2019 02:18 1/18/2019 14:24 1/19/2019 05:41 1/22/2019 11:59 Sodium Latest Ref Range: 136 - 145 mmol/L 144 146 (H)  147 (H) 147 (H) Potassium Latest Ref Range: 3.5 - 5.1 mmol/L 4.9 4.7  4.7 4.6 Chloride Latest Ref Range: 97 - 108 mmol/L 111 (H) 113 (H)  114 (H) 117 (H) CO2 Latest Ref Range: 21 - 32 mmol/L 23 24  23 21 Anion gap Latest Ref Range: 5 - 15 mmol/L 10 9  10 9 Glucose Latest Ref Range: 65 - 100 mg/dL 70 69  131 (H) 147 (H) BUN Latest Ref Range: 6 - 20 MG/DL 75 (H) 78 (H)  68 (H) 87 (H) Creatinine Latest Ref Range: 0.55 - 1.02 MG/DL 2.25 (H) 2.21 (H)  2.58 (H) 3.15 (H) BUN/Creatinine ratio Latest Ref Range: 12 - 20   33 (H) 35 (H)  26 (H) 28 (H) Calcium Latest Ref Range: 8.5 - 10.1 MG/DL 7.7 (L) 7.5 (L)  6.9 (L) 7.8 (L) Phosphorus Latest Ref Range: 2.6 - 4.7 MG/DL    4.8 (H) 5.2 (H) Magnesium Latest Ref Range: 1.6 - 2.4 mg/dL 2.6 (H)   2.3 GFR est non-AA Latest Ref Range: >60 ml/min/1.73m2 21 (L) 21 (L)  18 (L) 14 (L)  
GFR est AA Latest Ref Range: >60 ml/min/1.73m2 25 (L) 26 (L)  22 (L) 17 (L) Bilirubin, total Latest Ref Range: 0.2 - 1.0 MG/DL 0.5 Protein, total Latest Ref Range: 6.4 - 8.2 g/dL 5.8 (L) Albumin Latest Ref Range: 3.5 - 5.0 g/dL 2.5 (L)   3.1 (L) 2.2 (L) Globulin Latest Ref Range: 2.0 - 4.0 g/dL 3.3 A-G Ratio Latest Ref Range: 1.1 - 2.2   0.8 (L) ALT (SGPT) Latest Ref Range: 12 - 78 U/L 58 AST Latest Ref Range: 15 - 37 U/L 33 Alk. phosphatase Latest Ref Range: 45 - 117 U/L 134 (H) CK Latest Ref Range: 26 - 192 U/L   63 Troponin-I, Qt. Latest Ref Range: <0.05 ng/mL <0.05      
NT pro-BNP Latest Ref Range: 0 - 450 PG/ML 18,553 (H) Hemoglobin A1c, (calculated) Latest Ref Range: 4.2 - 6.3 %  7.9 (H) Est. average glucose Latest Units: mg/dL  180 Cor pulmonale with right ventricular failure. This is not generally a fixable problem. There is a markedly elevated BNP a few days ago and this is a form of heart failure that is not systolic nor overly left ventricular diastolic and thus, diuretics are used to try to ease symptoms, but as is seen in this particular case, there is really no answer as she has now become hypotensive and hypovolemic. The patient/family chose comfort measures with the support of Hospice. Patient meets for Routine LOC as evidenced by patient still verbal, will arouse to voice. Still tolerating some liquids, alert with periodic confusion. Verbal certification of terminal diagnosis with life expectancy of 6 months or less received from: Dr. Emre Marsh Prognosis estimated based on 01/25/19 clinical assessment is:  
[] Few to Many Hours [] Hours to Days [x] Few to Many Days  
[] Days to Weeks  
[] Few to Many Weeks  
[] Weeks to Months  
[] Few to Many Months CLINICAL INFORMATION Allergies: Allergies Allergen Reactions  Ace Inhibitors Other (comments) Elevated creatinine  Other Medication Rash Auromycin Chlorocetin Wt Readings from Last 3 Encounters:  
01/25/19 72.3 kg (159 lb 6.3 oz) 01/10/19 68 kg (150 lb) 08/01/18 61.1 kg (134 lb 12.8 oz) Ht Readings from Last 3 Encounters:  
01/17/19 5' 1\" (1.549 m)  
01/10/19 5' 1\" (1.549 m)  
07/03/18 5' 1\" (1.549 m) Body mass index is 30.12 kg/m². Visit Vitals /69 (BP 1 Location: Left arm, BP Patient Position: At rest) Pulse 75 Temp 97.3 °F (36.3 °C) Resp 16 Ht 5' 1\" (1.549 m) Wt 72.3 kg (159 lb 6.3 oz) SpO2 93% BMI 30.12 kg/m² LAB VALUES No results found for this visit on 01/17/19 (from the past 12 hour(s)). No results found for this visit on 01/17/19 (from the past 6 hour(s)). Lab Results Component Value Date/Time Protein, total 5.8 (L) 01/17/2019 05:22 PM  
 Albumin 2.2 (L) 01/22/2019 11:59 AM  
 
 
Currently this patient has: 
[x] Supplemental O2 [x] Peripheral IV  [] PICC    [] PORT  
[] Leigh Catheter [] NG Tube   [] PEG Tube [] Ostomy   
[] AICD: Has ICD been deactivated? [] Yes [] No:______ Progression to DEPENDENCE WITH ADLs (include time frame): today x- Dependent for bathing: personal hygiene and grooming x- Dependent for dressing: dressing and undressing x- Dependent for transferring: movement and mobility x- Dependent for toileting: continence-related tasks including control and hygiene x- Dependent for eating: preparing food and feeding ASSESSMENT & PLAN 1. Symptom Issues Identified: Shortness of breath, cyanosis, pain in lower back, legs and chest.  
 
2. Spiritual Issues Identified: none at this time 3.   Psych/ Social/ Emotional Issues Identified: Sister and brother trying to help coordinate her affairs, patient was never  nor had children. Brother lives in [place], sister Judy Rivera is local here. 4.  Care Coordination:  
Transfer to: [facility] Report given to: hospice house RN Transportation by: CHERISE Scheduled for 5pm 
 
Medications Needs:  
 
Current Facility-Administered Medications Medication Dose Route Frequency  albuterol (PROVENTIL VENTOLIN) nebulizer solution 2.5 mg  2.5 mg Nebulization Q2H PRN  
 acetaminophen (TYLENOL) tablet 650 mg  650 mg Oral Q4H PRN  
  Or  acetaminophen (TYLENOL) solution 650 mg  650 mg Oral Q4H PRN  
  Or  acetaminophen (TYLENOL) suppository 650 mg  650 mg Rectal Q4H PRN  
 morphine (ROXANOL) 100 mg/5 mL (20 mg/mL) concentrated solution 10 mg  10 mg Oral Q1H PRN  
 sodium chloride (OCEAN) 0.65 % nasal squeeze bottle 2 Spray  2 Spray Both Nostrils Q4H  
 vits A and D-white pet-lanolin (A&D) ointment   Topical BID  miconazole (SECURA) 2 % extra thick cream   Topical BID  
 balsam peru-castor oil (VENELEX     
 
DME: oxygen Supplies: TBD, chucks, gloves. Patient bites and sips for comfort. Skin is very fragile. POA Left heel, Pressure Injury DTI:  0.8 X 0.8 x 0 cm non blanching maroon. POA Right heel, Pressure Injury DTI: 0.5  x 0.5 x 0 cm diffuse non blanching maroon. POA Left buttock, Pressure Injury DTI:  2 x 1 x 0 cm; non blanching purple POA Right buttock, Pressure Injury DTI:  3 x 0.5 0 cm; non blanching purple Bilateral groin, red moist rash consistent with Candidiais Bilateral hands, multiple areas of linear dry abrasions from unknown source. 
  
Recommendations:   
Bilateral hands:  Twice daily apply Vitamin A&D ointment. Bilateral heel, sacrum and buttocks:  Twice daily apply Venelex.  
Bilateral groin:  Twice daily apply Secura antifungal cream. 
  
Skin Care & Pressure Prevention: 
Minimize layers of linen/pads under patient to optimize support surface.   
 Turn/reposition approximately every 2 hours and offload heels. Manage incontinence / promote continence

## 2019-01-25 NOTE — PROGRESS NOTES
Bedside shift change report given to Ramirez Swift (oncoming nurse) by Vicente Pena RN (offgoing nurse). Report included the following information SBAR, Kardex, MAR and Recent Results.

## 2019-01-26 NOTE — PROGRESS NOTES
0815: Received report from Kristie Alan RN. GIP LOC. Dx Heart Failure. 2146: Initial Assessment. Pt is lying on left side in bed Minimally responsive: Non-verbal. Eyes are closed. . Leigh draining milky, bloody urine. Bilateral LS are diminished and course. Breathing is unlabored. Hands are dark with several superficial scratches. Both feet are warm with cool toes. Upper extremities are cool to touch. No pedal pulses present. Radial pulses weak. 6L/min oxygen via NC.     1009: Patient lying in bed with eyes closed. Appears to be sleeping. Displaying no signs of distress. Breathing is regular and unlabored. 1130: Rounded with Barb Poon NP. Patient has been comfortable. Discussed changing pt's sublingual meds to IV since patient has IV access and could potentially be an aspiration risk. 1247: Patient appears to be comfortable. No grimacing, breathing is unlabored, body is relaxed. 1330: Patient is resting quietly. Coloring is pale and lips have a blue hue.    1400: Patient's sister, Kylee Crenshaw, and patient's nephew visiting. Patient is minimally responsive. Eyes remain closed. Patient grimaces upon touch but then relaxes. 1520: Attempted to shift patient in bed. Pt began to cry, grimaced and stiffened arms. Administered PRN lorazepam and morphine. 1650: Patient appears to be sleeping in bed. No grimacing. Body appears relaxed. Cyanosis of the mouth noted. 1730: Patient is resting in bed. Eyes are closed. No signs/symptoms of pain. 1835: Patient appears to be sleeping. Mouth remains cyanotic. Tongue appears large; Pt has no teeth.        NAME OF PATIENT:  Becki Guerrero    LEVEL OF CARE:  [facility]    REASON FOR GIP:   Medication adjustment that must be monitored 24/7    *PATIENT REMAINS ELIGIBLE FOR GIP LEVEL OF CARE AS EVIDENCED BY: (MUST BE ADDRESSED OF PATIENT GIP)   Patient is receiving IV medication that cannot be managed at home    REASON FOR RESPITE:  NA    O2 SAFETY:  Tanks stored in gutierrez  and No petroleum based products on face while oxygen in use    FALL INTERVENTIONS PROVIDED:   Implemented/recommended resources for alarm system (personal alarm, bed alarm, call bell, etc.) , Implemented/recommended environmental changes (remove hazards, lower bed, improve lighting, etc.) and Implemented/recommended increased supervision/assistance    INTERDISPLINARY COMMUNICATION/COLLABORATION:  Physician, MSW, Rayna and RN, CNA    NEW MEDICATION INITIATION DOCUMENTATION:  NA    Reason medication is being initiated:  NA    MD / Provider name consulted re: change in status / initiation of new medication:  NA    New Symptom(s):  NA    New Order(s):  NA    Name of the person notified of the changes:  NA    Name of person being taught:  NA    Instructions given:  NA    Side Effects taught:  NA    Response to teaching:  NA      COMFORTABLE DYING MEASURE:  Is Patient/family satisfied with symptom level?  yes    DISCHARGE PLAN:  Once symptoms are managed, MSW will work with family on discharge plan

## 2019-01-26 NOTE — PROGRESS NOTES
Problem: Pressure Injury - Risk of  Goal: *Prevention of pressure injury  Document Bandar Scale and appropriate interventions in the flowsheet. Outcome: Progressing Towards Goal  Pressure Injury Interventions:  Sensory Interventions: Assess changes in LOC    Moisture Interventions: Absorbent underpads    Activity Interventions: Pressure redistribution bed/mattress(bed type)    Mobility Interventions: Pressure redistribution bed/mattress (bed type)    Nutrition Interventions: Document food/fluid/supplement intake    Friction and Shear Interventions: Apply protective barrier, creams and emollients               Problem: Falls - Risk of  Goal: *Absence of Falls  Document Cathy Fall Risk and appropriate interventions in the flowsheet. Outcome: Progressing Towards Goal  Fall Risk Interventions:  Mobility Interventions: Bed/chair exit alarm    Mentation Interventions: Bed/chair exit alarm    Medication Interventions: Bed/chair exit alarm    Elimination Interventions: Call light in reach    History of Falls Interventions: Bed/chair exit alarm        Problem: Comfort Deficit  Goal: Reduce/control pain  Patient will report that pain has been reduced or controlled through verbal and nonverbal means and that measures to promote comfort are effective. Outcome: Progressing Towards Goal  Frequent assessments of pain and medication adjustments as needed.

## 2019-01-26 NOTE — PROGRESS NOTES
1830:  Pt arrived via [facility] with medications, DNR, and discharge paperwork. Pt tucked-in and VS completed. 1900:  Report given to Aryan Gaviria RN.

## 2019-01-26 NOTE — PROGRESS NOTES
[facility] to Those in Need  [phone]    Patient Name: Delphine Burkitt  YOB: 1935    Date of Provider Hospice Visit: 01/26/19    Level of Care:   [x] General Inpatient (GIP)    [] Routine   [] Respite    Current Location of Care:  [] [facility] [] [facility] [] [facility] [] [facility] [x] [facility], patient referred from:  [x] [facility] [] [facility] [] [facility] [] [facility] [] Home [] Other:     Date of Original Hospice Admission: 1/25/19  Hospice Medical Director at time of admission: Dr Malik Russo Diagnosis: end stage heart failure, combined systolic and diastolic CHF  Hx of COPD      HOSPICE SUMMARY        Delphine Burkitt is a 81y.o. year old who was admitted to [address] [place] [address]. The patient's principle diagnosis has resulted in severe weakness, fatigue, debility, anorexia, generalized pain and discomfort, shortness of breath, mildly agitated,some confusion, eats very little and has difficulty swallowing, lethargic, sleeps most of the day    Functionally, the patient's Karnofsky and/or Palliative Performance Scale has declined over a period of days and is estimated at 20%. The patient is dependent on the following ADLs:    Objective information that support this patients limited prognosis includes:      history of RV dysfunction due to lung disease and EF of 45-50%, mild LVH, dilated right ventricle and PA pressure of 56 with left bundle branch block and a creatinine of 1.6. Hx of COPD and acute and chronic respiratory failure  The patient/family chose comfort measures with the support of Hospice. HOSPICE DIAGNOSES   Active Symptoms:  1. Shortness of breath  2. Fatigue and weakness  3. Anorexia  4. Generalized pain  5. Anxiety       PLAN   1. Pt was changed to GIP status overnight due to pain requiring increasing IV medications  2. This morning she appears to be sleeping comfortably- did require IV medications overnight  3. Continue comfort meds  4.  Robinul 0.2mg IV every 4 hour as needed  5. Lorazepam 2mg PO every 1 hour as needed  6. Roxanol 10mg po q 1 hour as needed  7. Proventil nebs as needed  8. Bowel reg  9.  and SW to support family needs  8. Disposition: tbd    Prognosis estimated based on 01/26/19 clinical assessment is:   [x] Hours to Days    [] Days to Weeks    [] Other:    Communicated plan of care with: Hospice Case Manager;  Hospice IDT; Care Team     GOALS OF CARE     Resuscitation Status: DNR  Durable DNR: [x] Yes [] No    Primary Decision Maker (Health Care Agent): sister Enriquez  Relationship to patient:  Phone number:  [] Named in a scanned document   [x] Legal Next of Kin  [] Guardian    Secondary Decision Maker ([address]):   Relationship to patient:  Phone number:  [] Named in a scanned document   [] Legal Next of Kin  [] Guardian    ACP documents you current have include:  [] Advance Directive or Living Will  [] Durable Do Not Resuscitate  [] Physician Orders for Scope of Treatment (POST)  [] Medical Power of   [] Other    HISTORY     History obtained from: chart, RN    CHIEF COMPLAINT:   The patient is:   [x] Verbal minimally  [] Nonverbal  [] Unresponsive    HPI/SUBJECTIVE: 81year old female with end stage congestive heart fialure    In bed, sleeping, appears comfortable  Required ongoing monitoring and medications for pain overnight       REVIEW OF SYSTEMS     The following systems were: [x] reviewed  [] unable to be reviewed    Positive ROS include:  Constitutional: sleeping, did not arouse to verbal stimuli, appears somewhat comfortable  Ears/nose/mouth/throat: increased airway secretions  Respiratory:shortness of breath, wheezing  Gastrointestinal:poor appetite       Musculoskeletal:generalized pain,    Neurologic:confusion,  weakness  Psychiatric:anxiety        Adult Non-Verbal Pain Assessment Score: 2    Face  [] 0   No particular expression or smile  [x] 1   Occasional grimace, tearing, frowning, wrinkled forehead  [] 2   Frequent grimace, tearing, frowning, wrinkled forehead    Activity (movement)  [x] 0   Lying quietly, normal position  [] 1   Seeking attention through movement or slow, cautious movement  [] 2   Restless, excessive activity and/or withdrawal reflexes    Guarding  [x] 0   Lying quietly, no positioning of hands over areas of body  [] 1   Splinting areas of the body, tense  [] 2   Rigid, stiff    Physiology (vital signs)  [x] 0   Stable vital signs  [] 1   Change in any of the following: SBP > 20mm Hg; HR > 20/minute  [] 2   Change in any of the following: SBP > 30mm Hg; HR > 25/minute    Respiratory  [] 0   Baseline RR/SpO2, compliant with ventilator  [x] 1   RR > 10 above baseline, or 5% drop SpO2, mild asynchrony with ventilator  [] 2   RR > 20 above baseline, or 10% drop SpO2, asynchrony with ventilator     FUNCTIONAL ASSESSMENT     Palliative Performance Scale (PPS): 10-20%     PSYCHOSOCIAL/SPIRITUAL ASSESSMENT     Active Problems:    * No active hospital problems.  *    Past Medical History:   Diagnosis Date    MORGAN (acute kidney injury) ([facility] 3/25/2018    Arthritis     Chronic obstructive pulmonary disease ([facility] 2017    Cor pulmonale (HCC) 2017    Diabetes ([facility]     Elevated brain natriuretic peptide (BNP) level 2017    Essential hypertension 2017    Shortness of breath     Thyroid disease       Past Surgical History:   Procedure Laterality Date    HX CATARACT REMOVAL Bilateral       Social History     Tobacco Use    Smoking status: Former Smoker     Last attempt to quit: 2000     Years since quittin.0    Smokeless tobacco: Never Used   Substance Use Topics    Alcohol use: No     Family History   Problem Relation Age of Onset    Diabetes Brother       Allergies   Allergen Reactions    Ace Inhibitors Other (comments)     Elevated creatinine     Other Medication Rash     Auromycin  Chlorocetin      Current Facility-Administered Medications Medication Dose Route Frequency    senna-docusate (PERICOLACE) 8.6-50 mg per tablet 2 Tab  2 Tab Oral BID PRN    sennosides (SENOKOT) 8.8 mg/5 mL syrup 8.8 mg  5 mL Oral BID PRN    bisacodyl (DULCOLAX) suppository 10 mg  10 mg Rectal DAILY PRN    acetaminophen (TYLENOL) suppository 650 mg  650 mg Rectal Q4H PRN    albuterol-ipratropium (DUO-NEB) 2.5 MG-0.5 MG/3 ML  3 mL Nebulization Q2H PRN    LORazepam (INTENSOL) 2 mg/mL oral concentrate 1 mg  1 mg Oral Q1H PRN    morphine (ROXANOL) 100 mg/5 mL (20 mg/mL) concentrated solution 10 mg  10 mg SubLINGual Q1H PRN    glycopyrrolate (ROBINUL) injection 0.2 mg  0.2 mg IntraVENous Q4H PRN        PHYSICAL EXAM     Wt Readings from Last 3 Encounters:   01/25/19 159 lb 6.3 oz (72.3 kg)   01/10/19 150 lb (68 kg)   08/01/18 134 lb 12.8 oz (61.1 kg)       Visit Vitals  BP (!) 74/36 (BP 1 Location: Left arm, BP Patient Position: Lying left side)   Pulse 60   Temp 97 °F (36.1 °C)   Resp 11   SpO2 90%       Supplemental O2  [x] Yes  [] NO       Currently this patient has:  [] Peripheral IV [] PICC  [] PORT [] ICD    [] Leigh Catheter [] NG Tube   [] PEG Tube    [] Rectal Tube [] Drain  [] Other:           Pertinent Lab and or Imaging Tests:  Lab Results   Component Value Date/Time    Sodium 147 (H) 01/22/2019 11:59 AM    Potassium 4.6 01/22/2019 11:59 AM    Chloride 117 (H) 01/22/2019 11:59 AM    CO2 21 01/22/2019 11:59 AM    Anion gap 9 01/22/2019 11:59 AM    Glucose 147 (H) 01/22/2019 11:59 AM    BUN 87 (H) 01/22/2019 11:59 AM    Creatinine 3.15 (H) 01/22/2019 11:59 AM    BUN/Creatinine ratio 28 (H) 01/22/2019 11:59 AM    GFR est AA 17 (L) 01/22/2019 11:59 AM    GFR est non-AA 14 (L) 01/22/2019 11:59 AM    Calcium 7.8 (L) 01/22/2019 11:59 AM     Lab Results   Component Value Date/Time    Protein, total 5.8 (L) 01/17/2019 05:22 PM    Albumin 2.2 (L) 01/22/2019 11:59 AM           Total time:   Counseling / coordination time:   > 50% counseling / coordination  Parth Cardenas Cathie, NP

## 2019-01-26 NOTE — PROGRESS NOTES
Problem: Communication Deficit  Goal: Effectively communicate symptoms, needs, and concerns  Patient/family/caregiver will effectively communicate symptoms, needs and concerns. Outcome: Progressing Towards Goal  Patient having difficulty expressing needs secondary to lethargy.

## 2019-01-27 NOTE — PROGRESS NOTES
Verbal shift change report given to Patria Stewart RN by Steph Borges RN. Report included the following information SBAR, Kardex, Intake/Output and MAR.      Patient received 1 PRN dose of Robinul, 2 PRN doses of Roxanol, and 1 PRN dose of lorazepam on this night shift.     1930  Patient in [facility] care for DX of Heart Failure. 2000  Patient room quiet and peaceful. Patient showing no signs nor symptoms of pain nor dyspnea. 2030  Assessment performed. Patient unable to answer questions nor assist in moving self during assessment. Assessed patient with the help of Dulce Maria Crews CNA.    2100  Patient resting peacefully. It appears her medications, both scheduled and PRN, have been effective in managing her symptoms at this time. 2247  Medicated with Robinul for excessive secretions, ativan for agitation, and Morphine for pain. Patient turned to the left. 2315  Patient room quiet and peaceful. Patient showing no signs nor symptoms of pain nor dyspnea. 0006  Resting quietly. No signs/nor mdddd  0204  Patient room quiet and peaceful. Patient showing no signs nor symptoms of pain nor dyspnea. 4958  Patient medicated with morphine 2 mg IV for pain, as evidenced by grimacing, moaning, and restlessness. 0400  Secretions becoming difficult for patient to manage without coughing hard and long. Medicated with Robinul IV via right forearm 22g peripheral IV without difficulty. Medicated with PRN lorazepam as well as PRN Roxanol both sublingual to manage pain, agitation, grimacing, restlessness in the bed.  2306  Patient resting quietly. No signs nor symptoms of pain nor dyspnea. At this time, it appears the patient's pain and other symptoms are being well-managed by scheduled and PRN medications. 0030 Patient resting quietly with eyes closed. No signs nor symptoms of pain at this time.    Patient bathed, and repositioned in the bed.   0100  Patient resting quietly in bed, but picking at the air and at her other walls talking loudly about nothing in particular. When checked, her eyes continued to be closed. Could not tell if she was asleep or awake. 0230   Increasing discomfort and agitation as bath progressed. Medicated with PRN lorazepam and PRN Roxanol for grimacing, moaning, repetitive moving of both feet, yelling out.    1958  Patient room quiet and peaceful. Patient showing no signs nor symptoms of pain nor dyspnea. 0422  Patient sleeping. No signs nor symptoms of pain nor dyspnea. 0500  Patient found on rounds having already passed. Pronounced at 0500 on January 27, 2019. Sister Rosana Uribe notified by Landry Jones RN.   Rosana Uribe reported she would notify other famly and no one would be coming in to see Nilam Nguyễn.         NAME OF PATIENT:  Blair Lyon     LEVEL OF CARE:  [facility]     REASON FOR GIP:   Pain, despite numerous changes in medications, Unmanageable respiratory distress, Terminal agitation, despite changes to medications, Medication adjustment that must be monitored 24/7 and Stabilizing treatment that cannot take place at home     PATIENT REMAINS ELIGIBLE FOR [facility] LEVEL OF CARE AS EVIDENCED BY:  Need for frequent assessment and intervention with sublingual, nebulized, and IV medications, as well as non-pharmaceutical interventions which cannot be performed at home.       REASON FOR RESPITE:  Patient not in Respite at this time.       O2 SAFETY:  Concentrator positioning (6\" from furniture/drapes), No petroleum based products on face while oxygen in use and Oxygen sign on the door     FALL INTERVENTIONS PROVIDED:   Implemented/recommended use of non-skid footwear, Implemented/recommended use of fall risk identification flag to all team members, Implemented/recommended assistive devices and encouraged their use, Implemented/recommended resources for alarm system (personal alarm, bed alarm, call bell, etc.) , Implemented/recommended environmental changes (remove hazards, lower bed, improve lighting, etc.) and Implemented/recommended increased supervision/assistance     INTERDISPLINARY COMMUNICATION/COLLABORATION:  Physician, MSW, Rayna and RN, CNA     NEW MEDICATION INITIATION DOCUMENTATION:  No new medications nor interventions were begun on this night shift.       Reason medication is being initiated:  N/A     MD / Provider name consulted re: change in status / initiation of new medication:  N/A     New Symptom(s):  N/A     New Order(s):  N/A     Name of the person notified of the changes:  N/A     Name of person being taught:  N/A     Instructions given:  N/A     Side Effects taught:  N/A     Response to teaching:  N/A     COMFORTABLE DYING MEASURE:  Monitor for pain, dyspnea, agitation and restlessness and intervene accordingly.       Is Patient/family satisfied with symptom level?  yes     DISCHARGE PLAN:  Discharge plan is pending.

## 2019-01-27 NOTE — PROGRESS NOTES
8999 Phone call to the pts sister, Greg Palmer to notify of the pts death. She is grieving appropriately. She states they were expecting it. The family have chosen the [facility] for care. No family will be coming to the [facility]. She is very appreciative for care given.

## 2019-01-28 NOTE — PROGRESS NOTES
This patient was admitted to the [facility] on . She  on . I had neither contact with the patient nor the family during this brief period. Therefore a working [facility] was not developed. PLAN:  Respond to requests for spiritual care and bereavement support by [facility] as received, or PRN.

## 2019-02-07 NOTE — DISCHARGE SUMMARY
Discharge Summary PATIENT ID: Matilde Lockett MRN: [id number] YOB: 1935 DATE OF ADMISSION: 1/17/2019  4:20 PM   
DATE OF DISCHARGE: 1/25/2018 PRIMARY CARE PROVIDER: Bhakti Page MD  
 
ATTENDING PHYSICIAN: Richard Tapia MD  
DISCHARGING PROVIDER: Richard Tapia MD   
To contact this individual call [phone] and ask the  to page. If unavailable ask to be transferred the Adult Hospitalist Department. CONSULTATIONS: IP CONSULT TO NEPHROLOGY PROCEDURES/SURGERIES: * No surgery found * ADMITTING DIAGNOSES & HOSPITAL COURSE:  
 
 
Per PN's: 
 
Brief: 
  
Pt relayed to me this evning 1/25/2019 5:44 PM that she is aware of her imminent death and wishes to be a DNR. She further understands what this means and pt is oriented to person , place and time.  
Rc Jernigan MD 1/25/2019 Admission Summary: An 64-year-old white female with past medical history of chronic kidney disease, arthritis, COPD, cor pulmonale, CHF, hypertension, type 2 diabetes mellitus, hypothyroidism, gait abnormality presented to the emergency department from home via EMS with chief complaint of shortness of breath.  No generalized weakness.  The patient is a limited historian, majority of history was obtained from review of ED and electronic medical record.  Per the collective reports, the patient's sister tried to call the patient, became worried when she had not received an answer.  She reportedly went to check on the patient and found that the patient was unable to answer the phone. Christine Henley reports that she fell. 
  
Interval history / Subjective:  
  
1/23/2019 : 
Declines to non verbal else very lethargic /asleep on rounds. Low bp of  78 syst and pulse of 51 noted over last 12 hours, pt is now comfort care only, , pt does appear comfortable  
  
1/24/2019 : 
Full comfort care, orders adjusted to recognize this and effect thereof  
  
1/25/2019 :  
Pt appears comfortable Pt oriented x 3, answers questions readily,  No c/o pain, no sob, is participating w staff on rounds indicatinog her likes and dislikes of foods offered, pt is being fed, not to take as confusion as pt is not just weak and needs assistance as eating is part of the comfort care we are providing vis the loving staff at [facility].   
  
Assessment & Plan:  
  
1.  Chronic congestive heart failure NYHA 3-4 :  
Acute on chronic diastolic heart failure w/cor pulmonale (POA) in the setting of Lasix IV (ED), Dopamine gtt and Cardiology consulted 
- does not look volume overloaded her MM dry - per sister not eating / drinking well last few days 
- her BP low. hold BP meds and Diuretics 
- gentle hydration for now 
- higher BNP is sometime non specific in renal failure  
-cardiology tried dopamine drip 1/18 but 1/19 notes from Dr. Couch Side states : \" On dopamine would d/c and pursue palliative care\" = comfort care only as of pm 1/22.  
  
2.  Chronic hypoxic respiratory failure :  
- unclear etiology uses baseline home o2 . Possibly due to CHF -  Continue oxygen therapy and pulse telemetry monitoring. 
- CXR - mild interstitial edema and small pleural effusions. - currently placed on ventimask - changed to nc  
  
3.  Generalized weakness/debility.   
- Place on fall precautions. Reported fall at home get CK 
-  patient lives alone and not able to care for herself. - comfort care, bed ridden 1/22 - forward to 1/25/2019 =  
  
4.  Type 2 diabetes mellitus.   
- Place on Humalog insulin correction coverage, schedule Accu-Chek and check hemoglobin A1c level. lantus on hold= now only comfort meds, insulin is not a comfort med 
  
7.  Bilateral lower extremity edema.   
Keep legs elevated at rest.  Continue strict I's and O's. 
  
8.  Thrombocytopenia.  will monitor. No signs of bleeding. Hold any A/C- no further lab 1/22 forward as comfort care only  
  
9.  Dizziness.  Place on fall precautions. May be due to hypotension -  CT of the head without contrast to rule out any acute process. 
  
10.  Bradycardia.  Place hold parameters for beta blockers. Was on coreg 
  
11.  Acute superimposed on chronic kidney disease. Hypernatremia 
- nephrology on board - Renal us: Small kidneys with normal echogenicity without hydronephrosis. - IVF per nephro 
- will monitor renal function 
  
12. Hypothermia  resolved 
- could be due to sepsis 
- TSH low - levothyroxine adjusted - provide karen maher  
  
Palliative care consulted 
  
1/19: Discussed in length with sister Kylee Crenshaw regarding poor prognosis and she is in agreement. She does not wish to do anything aggressive measures and agreed to talk to hospice. She would prefer if the patient can be inpatient hospice. Hospice consult placed. = seen 1/22 and daily as needed to assist w dispo as can  
  
1/22: discussed again with sister Rafat Ferrell as to care plans (plans of care) and decision is to go with comfort measures only 1/22/2019 forward.  
  
Downgraded to remote tele 
  
1/20: hoping to have palliative care  and hospice discussions tomorrow with sister Kylee Crenshaw 
  
1/21: hospice meeting at 2pm today  
  
1/22 comfort care only .  
  
  
  
Code status: DNR 
DVT prophylaxis: SCD 
  
Care Plan discussed with: Patient/Family and Nurse Disposition: TBD. Skin :  
  
Patient is alert, communicative and requires assist with repositioning.   
Bed: [place] Patient wearing briefs for incontinence.   
Patient reports no pain. Heels offloaded on prevalon boots. 
  
POA Left heel, Pressure Injury DTI:  0.8 X 0.8 x 0 cm non blanching maroon. POA Right heel, Pressure Injury DTI: 0.5  x 0.5 x 0 cm diffuse non blanching maroon. POA Left buttock, Pressure Injury DTI:  2 x 1 x 0 cm; non blanching purple POA Right buttock, Pressure Injury DTI:  3 x 0.5 0 cm; non blanching purple Bilateral groin, red moist rash consistent with Candidiais Bilateral hands, multiple areas of linear dry abrasions from unknown source. 
  
Recommendations:   
Bilateral hands:  Twice daily apply Vitamin A&D ointment. Bilateral heel, sacrum and buttocks:  Twice daily apply Venelex. Bilateral groin:  Twice daily apply Secura antifungal cream. 
  
Skin Care & Pressure Prevention: 
Minimize layers of linen/pads under patient to optimize support surface.   
Turn/reposition approximately every 2 hours and offload heels. Manage incontinence / promote continence Specialty bed: [place]. Use only flat sheet and one incontinence pad. Please call [facility]  WJ patient is transferred and order an Envision on [employer] bed. 
  
Discussed above plan with patient and RN.   
  
          
Hospital Problems  Date Reviewed: 1/19/2019  
        Codes Class Noted POA  
  Obtundation ICD-10-CM: R40.1 ICD-9-CM: 780.09   1/19/2019 Unknown  
     
  SOB (shortness of breath) ICD-10-CM: R06.02 
ICD-9-CM: 786.05   1/17/2019 Unknown  
     
  Hypoxia ICD-10-CM: R09.02 
ICD-9-CM: 799.02   1/17/2019 Unknown  
     
  Inability to walk ICD-10-CM: R26.2 ICD-9-CM: 719.7   1/17/2019 Unknown  
     
  * (Principal) Acute exacerbation of CHF (congestive heart failure) (HCC) ICD-10-CM: I50.9 ICD-9-CM: 428.0   1/17/2019 Unknown  
     
  Acute-on-chronic kidney injury ([facility] [address] ICD-10-CM: N17.9, N18.9 ICD-9-CM: 584.9, 585.9   1/17/2019 Unknown  
     
   
  
  
  
Review of Systems:  
Review of systems not obtained due to patient factors.  
  
  
Vital Signs:  
 Last 24hrs VS reviewed since prior progress note. Most recent are: 
Visit Vitals /47 (BP 1 Location: Left arm, BP Patient Position: At rest) Pulse 67 Temp 97.6 °F (36.4 °C) Resp 16 Ht 5' 1\" (1.549 m) Wt 72.3 kg (159 lb 6.3 oz) SpO2 90% BMI 30.12 kg/m²  
  
  
No intake or output data in the 24 hours ending 01/25/19 0805  
  
Physical Examination:  
  
  
                                             
 Constitutional:  chronically ill looking  
     
     
     
     
     
 Neurologic:    Grossly  lethargic  
  
   
  
Data Review:  
 no further lab , vs noted.  
  
  
Labs:  
  
No results for input(s): WBC, HGB, HCT, PLT, HGBEXT, HCTEXT, PLTEXT, HGBEXT, HCTEXT, PLTEXT in the last 72 hours. Recent Labs  
  01/22/19 
1159 * K 4.6 * CO2 21 BUN 87* CREA 3.15* * CA 7.8* PHOS 5.2*  
  
   
Recent Labs  
  01/22/19 
1159 ALB 2.2*  
  
No results for input(s): INR, PTP, APTT in the last 72 hours. 
  
No lab exists for component: INREXT, INREXT No results for input(s): FE, TIBC, PSAT, FERR in the last 72 hours. No results found for: FOL, RBCF No results for input(s): PH, PCO2, PO2 in the last 72 hours. No results for input(s): CPK, CKNDX, TROIQ in the last 72 hours. 
  
No lab exists for component: CPKMB No results found for: CHOL, CHOLX, CHLST, CHOLV, HDL, LDL, LDLC, DLDLP, TGLX, TRIGL, TRIGP, CHHD, CHHDX Lab Results Component Value Date/Time  
  Glucose (POC) 159 (H) 01/22/2019 10:48 AM  
  Glucose (POC) 142 (H) 01/22/2019 06:25 AM  
  Glucose (POC) 152 (H) 01/21/2019 09:15 PM  
  Glucose (POC) 140 (H) 01/21/2019 03:58 PM  
  Glucose (POC) 159 (H) 01/21/2019 11:12 AM  
  
     
Lab Results Component Value Date/Time  
  Color YELLOW/STRAW 01/18/2019 12:44 PM  
  Appearance CLOUDY (A) 01/18/2019 12:44 PM  
  Specific gravity 1.015 01/18/2019 12:44 PM  
  pH (UA) 5.0 01/18/2019 12:44 PM  
  Protein 100 (A) 01/18/2019 12:44 PM  
  Glucose NEGATIVE  01/18/2019 12:44 PM  
  Ketone NEGATIVE  01/18/2019 12:44 PM  
  Bilirubin NEGATIVE  01/18/2019 12:44 PM  
  Urobilinogen 0.2 01/18/2019 12:44 PM  
  Nitrites NEGATIVE  01/18/2019 12:44 PM  
  Leukocyte Esterase TRACE (A) 01/18/2019 12:44 PM  
  Epithelial cells FEW 01/18/2019 12:44 PM  
  Bacteria 1+ (A) 01/18/2019 12:44 PM  
  WBC 0-4 01/18/2019 12:44 PM  
  RBC 10-20 01/18/2019 12:44 PM  
  
  
  
 Medications Reviewed:  
  
      
Current Facility-Administered Medications Medication Dose Route Frequency  albuterol (PROVENTIL VENTOLIN) nebulizer solution 2.5 mg  2.5 mg Nebulization Q2H PRN  
 acetaminophen (TYLENOL) tablet 650 mg  650 mg Oral Q4H PRN  
  Or  acetaminophen (TYLENOL) solution 650 mg  650 mg Oral Q4H PRN  
  Or  acetaminophen (TYLENOL) suppository 650 mg  650 mg Rectal Q4H PRN  
 morphine (ROXANOL) 100 mg/5 mL (20 mg/mL) concentrated solution 10 mg  10 mg Oral Q1H PRN  
 sodium chloride (OCEAN) 0.65 % nasal squeeze bottle 2 Spray  2 Spray Both Nostrils Q4H  
 vits A and D-white pet-lanolin (A&D) ointment   Topical BID  miconazole (SECURA) 2 % extra thick cream   Topical BID  
 balsam peru-castor oil (VENELEX) ointment   Topical BID  
  
 
 
 
 
DISCHARGE DIAGNOSES / PLAN:   
 
1.  as above ADDITIONAL CARE RECOMMENDATIONS: na  
 
PENDING TEST RESULTS:  
At the time of discharge the following test results are still pending: na 
 
FOLLOW UP APPOINTMENTS:   
Follow-up Information Follow up With Specialties Details Why Contact Info Mikael Dietz MD Family Practice In 4 days  [place] [id number] [place] [address] 
[phone] DIET: as tole card ACTIVITY: Activity as tolerated WOUND CARE: na 
 
EQUIPMENT needed: na 
 
 
DISCHARGE MEDICATIONS: 
Discharge Medication List as of 1/25/2019  2:51 PM  
  
 
 
 
NOTIFY YOUR PHYSICIAN FOR ANY OF THE FOLLOWING:  
Fever over 101 degrees for 24 hours. Chest pain, shortness of breath, fever, chills, nausea, vomiting, diarrhea, change in mentation, falling, weakness, bleeding. Severe pain or pain not relieved by medications. Or, any other signs or symptoms that you may have questions about. DISPOSITION: 
  Home With: 
 OT  PT  HH  RN  
  
 Long term SNF/Inpatient Rehab Independent/assisted living  
x Hospice  Other:  
 
 
PATIENT CONDITION AT DISCHARGE:  
 
 Functional status  
x Poor Deconditioned Independent Cognition  
x  Lucid Forgetful Dementia Catheters/lines (plus indication) Leigh PICC   
 PEG   
x None Code status Full code   
x DNR PHYSICAL EXAMINATION AT DISCHARGE: 
 Refer to Progress Note CHRONIC MEDICAL DIAGNOSES: 
Problem List as of 1/25/2019 Date Reviewed: 1/19/2019 Codes Class Noted - Resolved Obtundation ICD-10-CM: R40.1 ICD-9-CM: 780.09  1/19/2019 - Present SOB (shortness of breath) ICD-10-CM: R06.02 
ICD-9-CM: 786.05  1/17/2019 - Present Hypoxia ICD-10-CM: R09.02 
ICD-9-CM: 799.02  1/17/2019 - Present Inability to walk ICD-10-CM: R26.2 ICD-9-CM: 719.7  1/17/2019 - Present * (Principal) Acute exacerbation of CHF (congestive heart failure) (HCC) ICD-10-CM: I50.9 ICD-9-CM: 428.0  1/17/2019 - Present Acute-on-chronic kidney injury ([facility] ICD-10-CM: N17.9, N18.9 ICD-9-CM: 584.9, 585.9  1/17/2019 - Present Type 2 diabetes with nephropathy ([facility] ICD-10-CM: E11.21 
ICD-9-CM: 250.40, 583.81  1/10/2019 - Present Cor pulmonale (HCC) ICD-10-CM: J77.26 ICD-9-CM: 416.9  8/29/2017 - Present Diabetes ([facility] ICD-10-CM: E11.9 ICD-9-CM: 250.00  Unknown - Present HAYS (dyspnea on exertion) ICD-10-CM: R06.09 
ICD-9-CM: 786.09  7/23/2017 - Present Chronic obstructive pulmonary disease (HCC) ICD-10-CM: J44.9 ICD-9-CM: 143  7/23/2017 - Present Essential hypertension ICD-10-CM: I10 
ICD-9-CM: 401.9  7/23/2017 - Present RESOLVED: CHF exacerbation ([facility] ICD-10-CM: I50.9 ICD-9-CM: 428.0  5/17/2018 - 7/15/2018 RESOLVED: MORGAN (acute kidney injury) ([facility] ICD-10-CM: N17.9 ICD-9-CM: 584.9  3/25/2018 - 7/15/2018 RESOLVED: Thyroid disease ICD-10-CM: E07.9 ICD-9-CM: 246. 9  Unknown - 3/1/2018 RESOLVED: Elevated brain natriuretic peptide (BNP) level ICD-10-CM: R79.89 ICD-9-CM: 790.99  7/23/2017 - 7/15/2018 Chronic combined systolic and diastolic CHF (congestive heart failure) (HCC) (Chronic) ICD-10-CM: I50.42 
ICD-9-CM: 428.42, 428.0  3/1/2018 - Present COPD, severe ([facility] (Chronic) ICD-10-CM: J44.9 ICD-9-CM: 496  Unknown - Present Greater than  20  minutes were spent with the patient on counseling and coordination of care Signed:  
Janiya Alcantara MD 
2/7/2019 
8:14 AM

## 2019-02-07 NOTE — PROGRESS NOTES
No signs of sepsis/ during this admission. Pt end of life from CV status One can argue that in setting of pt's reduced EF and endstage heart that she mounted a non infectious systemic response with altered sensorium that fluctuated and deterioration in renal function and hypotensive with desats in setting of pul edema. Lisa Newton MD 2/7/2019

## 2023-01-10 NOTE — PROGRESS NOTES
Admission Medication Reconciliation: 
 
Information obtained from:  Patient/Pharmacy/RxQuery Comments/Recommendations: Updated PTA meds/reviewed patient's allergies. 1)  Spoke with patient and reviewed medication bottles. Patient states she took all her medications this morning, and that her Lantus is now 2 units once daily. 2)  Medication changes (since last review): Adjusted 
-Lantus 4 units SC daily --> 2 units SC daily Allergies:  Ace inhibitors and Other medication Significant PMH/Disease States:  
Past Medical History:  
Diagnosis Date  MORGAN (acute kidney injury) ([facility] [address] 3/25/2018  Arthritis  Chronic obstructive pulmonary disease ([facility] [address] 2017  Cor pulmonale ([facility] 2017  Diabetes ([facility] [address]  Elevated brain natriuretic peptide (BNP) level 2017  Essential hypertension 2017  Shortness of breath  Thyroid disease Chief Complaint for this Admission: Chief Complaint Patient presents with  Lethargy Prior to Admission Medications:  
Prior to Admission Medications Prescriptions Last Dose Informant Patient Reported? Taking?  
aspirin delayed-release 81 mg tablet 2019 at Unknown time  Yes Yes Sig: Take 81 mg by mouth daily. bumetanide (BUMEX) 2 mg tablet 2019 at Unknown time  Yes Yes Sig: Take 1 Tab by mouth daily. carvedilol (COREG) 6.25 mg tablet 2019 at Unknown time  Yes Yes Sig: Take  by mouth two (2) times daily (with meals). glipiZIDE (GLUCOTROL) 10 mg tablet 2019 at Unknown time  Yes Yes Sig: Take 10 mg by mouth two (2) times a day. insulin glargine (LANTUS U-100 INSULIN) 100 unit/mL injection 2019 at Unknown time  Yes Yes Si Units by SubCUTAneous route two (2) times a day. levothyroxine (SYNTHROID) 100 mcg tablet 2019 at Unknown time  Yes Yes Sig: Take 100 mcg by mouth six (6) days a week. Monday through Saturday  
levothyroxine (SYNTHROID) 100 mcg tablet   Yes Yes Message sent via Merlin reminding patient to submit cardiomems transmission.      Sig: Take 200 mcg by mouth every Sunday. lisinopril (PRINIVIL, ZESTRIL) 5 mg tablet 1/17/2019 at Unknown time  Yes Yes Sig: Take 5 mg by mouth two (2) times a day. vit A/vit C/vit E/zinc/copper (PRESERVISION AREDS PO) 1/17/2019 at Unknown time  Yes Yes Sig: Take 1 Tab by mouth daily. Facility-Administered Medications: None